# Patient Record
Sex: MALE | Race: WHITE | NOT HISPANIC OR LATINO | Employment: OTHER | URBAN - METROPOLITAN AREA
[De-identification: names, ages, dates, MRNs, and addresses within clinical notes are randomized per-mention and may not be internally consistent; named-entity substitution may affect disease eponyms.]

---

## 2023-04-11 DIAGNOSIS — N40.1 BENIGN PROSTATIC HYPERPLASIA WITH URINARY FREQUENCY: Primary | ICD-10-CM

## 2023-04-11 DIAGNOSIS — R35.0 BENIGN PROSTATIC HYPERPLASIA WITH URINARY FREQUENCY: Primary | ICD-10-CM

## 2023-04-11 RX ORDER — TAMSULOSIN HYDROCHLORIDE 0.4 MG/1
0.4 CAPSULE ORAL DAILY
Qty: 90 CAPSULE | Refills: 2 | Status: SHIPPED | OUTPATIENT
Start: 2023-04-11 | End: 2024-05-03 | Stop reason: SDUPTHER

## 2023-04-11 RX ORDER — TAMSULOSIN HYDROCHLORIDE 0.4 MG/1
0.4 CAPSULE ORAL DAILY
COMMUNITY
End: 2023-04-11 | Stop reason: SDUPTHER

## 2023-04-18 LAB
ABO GROUP (TYPE) IN BLOOD: NORMAL
ACTIVATED PARTIAL THROMBOPLASTIN TIME IN PPP BY COAGULATION ASSAY: 29 SEC (ref 26–39)
ANION GAP IN SER/PLAS: 10 MMOL/L (ref 10–20)
ANTIBODY SCREEN: NORMAL
CALCIUM (MG/DL) IN SER/PLAS: 9.2 MG/DL (ref 8.6–10.3)
CARBON DIOXIDE, TOTAL (MMOL/L) IN SER/PLAS: 31 MMOL/L (ref 21–32)
CHLORIDE (MMOL/L) IN SER/PLAS: 106 MMOL/L (ref 98–107)
CREATININE (MG/DL) IN SER/PLAS: 1.13 MG/DL (ref 0.5–1.3)
ERYTHROCYTE DISTRIBUTION WIDTH (RATIO) BY AUTOMATED COUNT: 13.8 % (ref 11.5–14.5)
ERYTHROCYTE MEAN CORPUSCULAR HEMOGLOBIN CONCENTRATION (G/DL) BY AUTOMATED: 34.1 G/DL (ref 32–36)
ERYTHROCYTE MEAN CORPUSCULAR VOLUME (FL) BY AUTOMATED COUNT: 94 FL (ref 80–100)
ERYTHROCYTES (10*6/UL) IN BLOOD BY AUTOMATED COUNT: 4.94 X10E12/L (ref 4.5–5.9)
GFR MALE: 69 ML/MIN/1.73M2
GLUCOSE (MG/DL) IN SER/PLAS: 105 MG/DL (ref 74–99)
HEMATOCRIT (%) IN BLOOD BY AUTOMATED COUNT: 46.3 % (ref 41–52)
HEMOGLOBIN (G/DL) IN BLOOD: 15.8 G/DL (ref 13.5–17.5)
INR IN PPP BY COAGULATION ASSAY: 1 (ref 0.9–1.1)
LEUKOCYTES (10*3/UL) IN BLOOD BY AUTOMATED COUNT: 5.6 X10E9/L (ref 4.4–11.3)
NRBC (PER 100 WBCS) BY AUTOMATED COUNT: 0 /100 WBC (ref 0–0)
PLATELETS (10*3/UL) IN BLOOD AUTOMATED COUNT: 142 X10E9/L (ref 150–450)
POTASSIUM (MMOL/L) IN SER/PLAS: 4.6 MMOL/L (ref 3.5–5.3)
PROTHROMBIN TIME (PT) IN PPP BY COAGULATION ASSAY: 11.5 SEC (ref 9.8–13.4)
RH FACTOR: NORMAL
SODIUM (MMOL/L) IN SER/PLAS: 142 MMOL/L (ref 136–145)
UREA NITROGEN (MG/DL) IN SER/PLAS: 15 MG/DL (ref 6–23)

## 2023-04-19 LAB — URINE CULTURE: NORMAL

## 2023-04-25 ENCOUNTER — HOSPITAL ENCOUNTER (OUTPATIENT)
Dept: DATA CONVERSION | Facility: HOSPITAL | Age: 73
End: 2023-04-25
Attending: RADIOLOGY | Admitting: RADIOLOGY
Payer: MEDICARE

## 2023-04-25 DIAGNOSIS — C64.2 MALIGNANT NEOPLASM OF LEFT KIDNEY, EXCEPT RENAL PELVIS (MULTI): ICD-10-CM

## 2023-04-25 DIAGNOSIS — I10 ESSENTIAL (PRIMARY) HYPERTENSION: ICD-10-CM

## 2023-04-25 DIAGNOSIS — N28.9 DISORDER OF KIDNEY AND URETER, UNSPECIFIED: ICD-10-CM

## 2023-04-25 DIAGNOSIS — D30.02 BENIGN NEOPLASM OF LEFT KIDNEY: ICD-10-CM

## 2023-04-25 DIAGNOSIS — Z90.49 ACQUIRED ABSENCE OF OTHER SPECIFIED PARTS OF DIGESTIVE TRACT: ICD-10-CM

## 2023-04-25 DIAGNOSIS — Q61.02 CONGENITAL MULTIPLE RENAL CYSTS: ICD-10-CM

## 2023-04-25 DIAGNOSIS — N43.3 HYDROCELE, UNSPECIFIED: ICD-10-CM

## 2023-04-25 DIAGNOSIS — F41.9 ANXIETY DISORDER, UNSPECIFIED: ICD-10-CM

## 2023-04-25 DIAGNOSIS — K21.9 GASTRO-ESOPHAGEAL REFLUX DISEASE WITHOUT ESOPHAGITIS: ICD-10-CM

## 2023-04-25 DIAGNOSIS — E78.00 PURE HYPERCHOLESTEROLEMIA, UNSPECIFIED: ICD-10-CM

## 2023-04-25 DIAGNOSIS — N40.0 BENIGN PROSTATIC HYPERPLASIA WITHOUT LOWER URINARY TRACT SYMPTOMS: ICD-10-CM

## 2023-05-03 LAB
COMPLETE PATHOLOGY REPORT: NORMAL
CONVERTED CLINICAL DIAGNOSIS-HISTORY: NORMAL
CONVERTED FINAL DIAGNOSIS: NORMAL
CONVERTED FINAL REPORT PDF LINK TO COPY AND PASTE: NORMAL
CONVERTED GROSS DESCRIPTION: NORMAL

## 2023-05-25 PROBLEM — K76.0 FATTY LIVER: Status: ACTIVE | Noted: 2023-05-25

## 2023-05-25 PROBLEM — S89.91XA INJURY OF RIGHT LEG: Status: ACTIVE | Noted: 2023-05-25

## 2023-05-25 PROBLEM — S86.119A GASTROCNEMIUS TEAR: Status: ACTIVE | Noted: 2023-05-25

## 2023-05-25 PROBLEM — H90.3 BILATERAL SENSORINEURAL HEARING LOSS: Status: ACTIVE | Noted: 2023-05-25

## 2023-05-25 PROBLEM — H61.20 CERUMEN IMPACTION: Status: ACTIVE | Noted: 2023-05-25

## 2023-05-25 PROBLEM — H61.21 EXCESSIVE CERUMEN IN RIGHT EAR CANAL: Status: ACTIVE | Noted: 2023-05-25

## 2023-05-25 PROBLEM — N17.9 ACUTE KIDNEY INJURY (CMS-HCC): Status: ACTIVE | Noted: 2023-05-25

## 2023-05-25 PROBLEM — S86.111A RUPTURE OF RIGHT GASTROCNEMIUS TENDON: Status: ACTIVE | Noted: 2023-05-25

## 2023-05-25 PROBLEM — N28.1 RENAL CYST: Status: ACTIVE | Noted: 2023-05-25

## 2023-05-25 PROBLEM — F41.9 ANXIETY DISORDER: Status: ACTIVE | Noted: 2023-05-25

## 2023-05-25 PROBLEM — N40.0 ENLARGED PROSTATE: Status: ACTIVE | Noted: 2023-05-25

## 2023-05-25 PROBLEM — M54.50 CHRONIC LOW BACK PAIN: Status: ACTIVE | Noted: 2023-05-25

## 2023-05-25 PROBLEM — R07.9 CHEST PAIN: Status: ACTIVE | Noted: 2023-05-25

## 2023-05-25 PROBLEM — E78.5 HYPERLIPIDEMIA: Status: ACTIVE | Noted: 2023-05-25

## 2023-05-25 PROBLEM — I10 BENIGN ESSENTIAL HYPERTENSION: Status: ACTIVE | Noted: 2023-05-25

## 2023-05-25 PROBLEM — K21.9 GERD (GASTROESOPHAGEAL REFLUX DISEASE): Status: ACTIVE | Noted: 2023-05-25

## 2023-05-25 PROBLEM — M79.669 CALF PAIN: Status: ACTIVE | Noted: 2023-05-25

## 2023-05-25 PROBLEM — G89.29 CHRONIC LOW BACK PAIN: Status: ACTIVE | Noted: 2023-05-25

## 2023-05-25 PROBLEM — N43.3 HYDROCELE, BILATERAL: Status: ACTIVE | Noted: 2023-05-25

## 2023-05-25 PROBLEM — G93.32 CHRONIC FATIGUE SYNDROME: Status: ACTIVE | Noted: 2023-05-25

## 2023-05-25 PROBLEM — I86.1 BILATERAL VARICOCELES: Status: ACTIVE | Noted: 2023-05-25

## 2023-05-31 ENCOUNTER — OFFICE VISIT (OUTPATIENT)
Dept: PRIMARY CARE | Facility: CLINIC | Age: 73
End: 2023-05-31
Payer: MEDICARE

## 2023-05-31 VITALS
WEIGHT: 269 LBS | OXYGEN SATURATION: 98 % | DIASTOLIC BLOOD PRESSURE: 70 MMHG | HEART RATE: 65 BPM | TEMPERATURE: 97.4 F | SYSTOLIC BLOOD PRESSURE: 148 MMHG | RESPIRATION RATE: 16 BRPM | BODY MASS INDEX: 37.52 KG/M2

## 2023-05-31 DIAGNOSIS — K43.9 VENTRAL HERNIA WITHOUT OBSTRUCTION OR GANGRENE: ICD-10-CM

## 2023-05-31 DIAGNOSIS — I10 BENIGN ESSENTIAL HYPERTENSION: Primary | ICD-10-CM

## 2023-05-31 PROCEDURE — 3077F SYST BP >= 140 MM HG: CPT | Performed by: INTERNAL MEDICINE

## 2023-05-31 PROCEDURE — 1036F TOBACCO NON-USER: CPT | Performed by: INTERNAL MEDICINE

## 2023-05-31 PROCEDURE — G0439 PPPS, SUBSEQ VISIT: HCPCS | Performed by: INTERNAL MEDICINE

## 2023-05-31 PROCEDURE — 1159F MED LIST DOCD IN RCRD: CPT | Performed by: INTERNAL MEDICINE

## 2023-05-31 PROCEDURE — 99214 OFFICE O/P EST MOD 30 MIN: CPT | Performed by: INTERNAL MEDICINE

## 2023-05-31 PROCEDURE — 3008F BODY MASS INDEX DOCD: CPT | Performed by: INTERNAL MEDICINE

## 2023-05-31 PROCEDURE — 1170F FXNL STATUS ASSESSED: CPT | Performed by: INTERNAL MEDICINE

## 2023-05-31 PROCEDURE — 3078F DIAST BP <80 MM HG: CPT | Performed by: INTERNAL MEDICINE

## 2023-05-31 PROCEDURE — 1160F RVW MEDS BY RX/DR IN RCRD: CPT | Performed by: INTERNAL MEDICINE

## 2023-05-31 RX ORDER — LANOLIN ALCOHOL/MO/W.PET/CERES
1 CREAM (GRAM) TOPICAL NIGHTLY
COMMUNITY

## 2023-05-31 RX ORDER — ESCITALOPRAM OXALATE 5 MG/1
5 TABLET ORAL DAILY
COMMUNITY
End: 2023-10-19 | Stop reason: SDUPTHER

## 2023-05-31 RX ORDER — SIMVASTATIN 20 MG/1
20 TABLET, FILM COATED ORAL NIGHTLY
COMMUNITY
Start: 2015-05-14 | End: 2023-11-16

## 2023-05-31 RX ORDER — BUPROPION HYDROCHLORIDE 150 MG/1
150 TABLET ORAL DAILY
Status: ON HOLD | COMMUNITY
End: 2023-10-23 | Stop reason: ENTERED-IN-ERROR

## 2023-05-31 RX ORDER — IBUPROFEN 600 MG/1
600 TABLET ORAL 3 TIMES DAILY
Status: ON HOLD | COMMUNITY
Start: 2017-06-07 | End: 2023-10-23 | Stop reason: ENTERED-IN-ERROR

## 2023-05-31 RX ORDER — FAMOTIDINE 20 MG/1
20 TABLET, FILM COATED ORAL DAILY PRN
COMMUNITY
Start: 2023-05-23 | End: 2024-01-19 | Stop reason: ENTERED-IN-ERROR

## 2023-05-31 ASSESSMENT — ENCOUNTER SYMPTOMS
DIARRHEA: 0
FREQUENCY: 0
CONSTIPATION: 0
HEMATURIA: 0
BLOOD IN STOOL: 0

## 2023-05-31 ASSESSMENT — PATIENT HEALTH QUESTIONNAIRE - PHQ9
2. FEELING DOWN, DEPRESSED OR HOPELESS: NOT AT ALL
1. LITTLE INTEREST OR PLEASURE IN DOING THINGS: NOT AT ALL
SUM OF ALL RESPONSES TO PHQ9 QUESTIONS 1 AND 2: 0

## 2023-05-31 ASSESSMENT — ACTIVITIES OF DAILY LIVING (ADL)
DRESSING: INDEPENDENT
GROCERY_SHOPPING: INDEPENDENT
MANAGING_FINANCES: INDEPENDENT
DOING_HOUSEWORK: INDEPENDENT
BATHING: INDEPENDENT
TAKING_MEDICATION: INDEPENDENT

## 2023-05-31 NOTE — PROGRESS NOTES
Patient here for a Medicare wellness visit     Subjective   Patient ID: Jovanny Yap is a 73 y.o. male who presents for Medicare Annual Wellness Visit Subsequent.    The patient met with  from Urology for a left-sided renal lesion, and was initially scheduled to undergo a left open partial nephrectomy in 5/2023.  He completed an MRI Abdomen in 3/2023 which showed no significant interval change known oncocytic lesion left kidney, and newly seen left perinephric abnormality compatible with small hematoma with surrounding enhancing tissue likely granulation tissue.  The surgical procedure was postponed, and the patient states he will be monitored with MRI imaging every 6 months for surveillance.  He denies any hematuria or urinary symptoms.    The patient reports a ventral abdominal hernia, and requests a referral to General Surgery.  He reports a history of right hemicolectomy in 11/2012.  He denies any hematochezia, melena, or bowel problems.     The patient recently presented to the ER for another episode of atypical chest pain on 5/23/2023.  He recalls that routine investigations were generally reassuring and he was discharged home that same day in stable condition.  The patient requests a referral to Cardiology for evaluation and treatment.    The patient met with Dermatology yesterday where a skin lesion was removed.  He states that the pathology results are pending.       Review of Systems   Gastrointestinal:  Negative for blood in stool, constipation and diarrhea.        Positive for ventral abdominal hernia.   Genitourinary:  Negative for frequency and hematuria.       Objective   Physical Exam  Constitutional:       Appearance: Normal appearance.   Cardiovascular:      Rate and Rhythm: Normal rate and regular rhythm.      Heart sounds: Normal heart sounds.   Pulmonary:      Effort: Pulmonary effort is normal.      Breath sounds: Normal breath sounds.   Abdominal:      General: Bowel sounds are  normal.      Palpations: Abdomen is soft.      Tenderness: There is no abdominal tenderness.   Skin:     General: Skin is warm and dry.   Neurological:      General: No focal deficit present.      Mental Status: He is alert and oriented to person, place, and time. Mental status is at baseline.   Psychiatric:         Mood and Affect: Mood normal.         Behavior: Behavior normal.       Assessment/Plan   Problem List Items Addressed This Visit       Benign essential hypertension - Primary    Relevant Orders    Referral to Cardiology     Other Visit Diagnoses       Ventral hernia without obstruction or gangrene        Relevant Orders    Referral to General Surgery            Medicare Wellness Examination Done  -  Discussed healthy diet and regular exercise.    -  Physical exam overall unremarkable. Immunizations reviewed and updated accordingly. Healthy lifestyle choices discussed (tobacco avoidance, appropriate alcohol use, avoidance of illicit substances).   -  Patient is wearing seatbelt.   -  Screening lab work ordered as indicated.    -  Age appropriate screening tests reviewed with patient.       IMPRESSION/PLAN:     Ventral Abdominal Hernia  - Ordered referral to General Surgery with  and patient given contact information.    HTN  - /70 today in office.  Ordered referral to Cardiology with  and patient given contact information.     HLD   - Stable, continue on simvastatin 20mg QD.  CT Cardiac Score 8.77 per 11/2022.     Kidney Lesion   - Incidental CT Abdomen finding of 3.4 x 2.7 cm ill-defined hypodense area in the interpolar region of the left kidney. MRI Abdomen 3/2023 showed no significant interval change known oncocytic lesion left kidney, and newly seen left perinephric abnormality compatible with small hematoma with surrounding enhancing tissue likely granulation tissue. Attention recommended on follow-up assessment.     Small Bowel Obstruction   - Stable. No repeat imaging  necessary per [facility] during hospital stay. Monitor for now. Advised patient to return to ED with return of symptoms.     Chronic Fatigue Syndrome/Depression   - Symptoms stable on escitalopram 5mg QD. Continue with Vitamin B12 1000 mcg QD per Psychiatry. Discontinued bupropion ER 150mg QD after small bowel obstruction in 12/2022.     BPH   - Last PSA in normal range - 9/2022, maintained on tamsulosin 0.4mg QD.     Health Maintenance   - Routine labs 9/2022. Last PSA wnl 9/2022.  Last colonoscopy performed 10/2018, due for repeat 10/2023.      Follow up in 6 months, call sooner if needed.        Scribe Attestation  By signing my name below, I, Herberth Henson   attest that this documentation has been prepared under the direction and in the presence of Jhonny Cuevas DO.

## 2023-06-22 DIAGNOSIS — K21.9 GASTROESOPHAGEAL REFLUX DISEASE WITHOUT ESOPHAGITIS: Primary | ICD-10-CM

## 2023-06-22 RX ORDER — PANTOPRAZOLE SODIUM 40 MG/1
40 TABLET, DELAYED RELEASE ORAL DAILY
Qty: 90 TABLET | Refills: 2 | Status: SHIPPED | OUTPATIENT
Start: 2023-06-22 | End: 2024-04-24 | Stop reason: SDUPTHER

## 2023-06-22 RX ORDER — PANTOPRAZOLE SODIUM 40 MG/1
1 TABLET, DELAYED RELEASE ORAL DAILY
COMMUNITY
Start: 2022-09-01 | End: 2023-06-22 | Stop reason: SDUPTHER

## 2023-08-24 ENCOUNTER — PATIENT OUTREACH (OUTPATIENT)
Dept: CARE COORDINATION | Facility: CLINIC | Age: 73
End: 2023-08-24
Payer: MEDICARE

## 2023-08-24 RX ORDER — ONDANSETRON 4 MG/1
4 TABLET, ORALLY DISINTEGRATING ORAL EVERY 8 HOURS PRN
Status: ON HOLD | COMMUNITY
End: 2023-10-23 | Stop reason: ENTERED-IN-ERROR
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
## 2023-08-30 ENCOUNTER — OFFICE VISIT (OUTPATIENT)
Dept: PRIMARY CARE | Facility: CLINIC | Age: 73
End: 2023-08-30
Payer: MEDICARE

## 2023-08-30 VITALS
WEIGHT: 259 LBS | OXYGEN SATURATION: 98 % | TEMPERATURE: 97.8 F | HEART RATE: 78 BPM | SYSTOLIC BLOOD PRESSURE: 148 MMHG | DIASTOLIC BLOOD PRESSURE: 80 MMHG | RESPIRATION RATE: 18 BRPM | BODY MASS INDEX: 36.12 KG/M2

## 2023-08-30 DIAGNOSIS — K56.609 SBO (SMALL BOWEL OBSTRUCTION) (MULTI): Primary | ICD-10-CM

## 2023-08-30 PROBLEM — I82.409 DVT (DEEP VENOUS THROMBOSIS) (MULTI): Status: ACTIVE | Noted: 2023-08-30

## 2023-08-30 PROBLEM — I82.412 ACUTE DEEP VEIN THROMBOSIS (DVT) OF FEMORAL VEIN OF LEFT LOWER EXTREMITY (MULTI): Status: ACTIVE | Noted: 2023-08-30

## 2023-08-30 PROBLEM — N28.9 KIDNEY LESION: Status: ACTIVE | Noted: 2023-08-30

## 2023-08-30 PROBLEM — G89.29 CHRONIC RIGHT SHOULDER PAIN: Status: ACTIVE | Noted: 2023-08-30

## 2023-08-30 PROBLEM — K43.2 INCISIONAL HERNIA: Status: ACTIVE | Noted: 2023-08-30

## 2023-08-30 PROBLEM — R11.2 NAUSEA AND VOMITING: Status: ACTIVE | Noted: 2023-08-30

## 2023-08-30 PROBLEM — R53.1 WEAKNESS: Status: ACTIVE | Noted: 2023-08-30

## 2023-08-30 PROBLEM — R19.7 DIARRHEA: Status: ACTIVE | Noted: 2023-08-30

## 2023-08-30 PROBLEM — R93.1 ELEVATED CORONARY ARTERY CALCIUM SCORE: Status: ACTIVE | Noted: 2023-08-30

## 2023-08-30 PROBLEM — M25.511 CHRONIC RIGHT SHOULDER PAIN: Status: ACTIVE | Noted: 2023-08-30

## 2023-08-30 PROBLEM — R68.89 FLU-LIKE SYMPTOMS: Status: ACTIVE | Noted: 2023-08-30

## 2023-08-30 PROCEDURE — 3008F BODY MASS INDEX DOCD: CPT | Performed by: INTERNAL MEDICINE

## 2023-08-30 PROCEDURE — 1160F RVW MEDS BY RX/DR IN RCRD: CPT | Performed by: INTERNAL MEDICINE

## 2023-08-30 PROCEDURE — 1125F AMNT PAIN NOTED PAIN PRSNT: CPT | Performed by: INTERNAL MEDICINE

## 2023-08-30 PROCEDURE — 1159F MED LIST DOCD IN RCRD: CPT | Performed by: INTERNAL MEDICINE

## 2023-08-30 PROCEDURE — 3077F SYST BP >= 140 MM HG: CPT | Performed by: INTERNAL MEDICINE

## 2023-08-30 PROCEDURE — 99214 OFFICE O/P EST MOD 30 MIN: CPT | Performed by: INTERNAL MEDICINE

## 2023-08-30 PROCEDURE — 3079F DIAST BP 80-89 MM HG: CPT | Performed by: INTERNAL MEDICINE

## 2023-08-30 PROCEDURE — 1036F TOBACCO NON-USER: CPT | Performed by: INTERNAL MEDICINE

## 2023-08-30 ASSESSMENT — PATIENT HEALTH QUESTIONNAIRE - PHQ9
2. FEELING DOWN, DEPRESSED OR HOPELESS: NOT AT ALL
SUM OF ALL RESPONSES TO PHQ9 QUESTIONS 1 AND 2: 0
1. LITTLE INTEREST OR PLEASURE IN DOING THINGS: NOT AT ALL

## 2023-08-30 ASSESSMENT — ENCOUNTER SYMPTOMS
DIARRHEA: 0
ABDOMINAL PAIN: 0
BLOOD IN STOOL: 0
CONSTIPATION: 0

## 2023-08-30 NOTE — PROGRESS NOTES
Patient here for a follow up from [facility] for a bowel obstruction     Subjective   Patient ID: Jovanny Yap is a 73 y.o. male who presents for GI Problem.  He is accompanied by his wife who offers some of the history.    The patient reports an episode of abdominal pain and vomiting, similar to a previous episode of small bowel obstruction, that lead to an ED visit at [facility] on 8/21/2023.  A CT Abdomen/Pelvis with Contrast confirmed a current small bowel obstruction with multiple transition points.  The patient was admitted, and an NG tube was placed for intermittent suction.  He was seen by General Surgery who deemed that conservative management was sufficient, as the patient began to tolerate oral intake.  He was discharged home in stable condition on 8/23/2023 with instructions to follow up with his Primary Care Physician.  He denies any current abdominal pain, hematochezia, melena, mucus, or bowel problems.     The patient has met with  from Cardiology for hypertension, who recommended a stress echocardiogram for further evaluation.  He completed the procedure in 7/2023 which was found to be normal with no evidence of ischemia.      The patient is following with  from General Surgery for a long-standing incisional hernia.  He was advised to return to the clinic following his colonoscopy and an evaluation from Neurology per the note, and discuss possible surgery at that time.  He is scheduled for an appointment tomorrow for the recent small bowel obstruction.    The patient continues to take Xarelto 15-20 mg Dosepack for a history of left deep venous thrombosis.  He continues to follow with  from Vascular Medicine.     The patient and his wife are going to travel to [place] for a 55 year high school reunion.    GI Problem  Primary symptoms do not include abdominal pain or diarrhea.   The illness does not include constipation.     Review of Systems   Gastrointestinal:  Negative for  abdominal pain, blood in stool, constipation and diarrhea.     Objective   Physical Exam  Constitutional:       Appearance: Normal appearance.   Neck:      Vascular: No carotid bruit.   Cardiovascular:      Rate and Rhythm: Normal rate and regular rhythm.      Heart sounds: Normal heart sounds.   Pulmonary:      Effort: Pulmonary effort is normal.      Breath sounds: Normal breath sounds.   Abdominal:      General: Bowel sounds are normal.      Palpations: Abdomen is soft.      Tenderness: There is no abdominal tenderness.   Skin:     General: Skin is warm and dry.   Neurological:      General: No focal deficit present.      Mental Status: He is alert and oriented to person, place, and time. Mental status is at baseline.   Psychiatric:         Mood and Affect: Mood normal.         Behavior: Behavior normal.       Assessment/Plan       IMPRESSION/PLAN:     Small Bowel Obstruction   - Second SBO episode 8/21/2023 treated conservatively.  First SBO 12/2022 treated conservatively.  Stable. No repeat imaging necessary per [facility] during hospital stay. Monitor for now. Advised patient to return to ED with return of symptoms.  Patient has appointment with  from General Surgery tomorrow.    HTN  - /80 today in office.  Last Stress Test 7/2023 unremarkable.  Following with  from Cardiology.     HLD   - Stable, continue on simvastatin 20mg QD.  CT Cardiac Score 8.77 per 11/2022.     Kidney Lesion   - Incidental CT Abdomen finding of 3.4 x 2.7 cm ill-defined hypodense area in the interpolar region of the left kidney. MRI Abdomen 3/2023 showed no significant interval change known oncocytic lesion left kidney, and newly seen left perinephric abnormality compatible with small hematoma with surrounding enhancing tissue likely granulation tissue. Attention recommended on follow-up assessment.     Left DVT  - Diagnosed per Venous Duplex U/S 8/3/2023.  Continue with Xarelto 15-20 mg Dosepack.  Following with   from Vascular Medicine.    Ventral Abdominal Hernia  - Following with  from General Surgery.    Chronic Fatigue Syndrome/Depression   - Symptoms stable on escitalopram 5mg QD. Continue with Vitamin B12 1000 mcg QD per Psychiatry. Discontinued bupropion ER 150mg QD after small bowel obstruction in 12/2022.     BPH   - Last PSA in normal range - 9/2022, maintained on tamsulosin 0.4mg QD.     Health Maintenance   - Routine labs 9/2022. Last PSA wnl 9/2022.  Last colonoscopy performed 10/2018, due for repeat 10/2023 and patient would like to schedule at next visit.      Follow up in 10/2023,  call sooner if needed.        Scribe Attestation  By signing my name below, I, Herberth Henson   attest that this documentation has been prepared under the direction and in the presence of Jhonny Cuevas DO.

## 2023-09-01 ENCOUNTER — PATIENT OUTREACH (OUTPATIENT)
Dept: PRIMARY CARE | Facility: CLINIC | Age: 73
End: 2023-09-01
Payer: MEDICARE

## 2023-09-01 NOTE — PROGRESS NOTES
Call regarding  after hospitalization.  At time of outreach call the patient feels as if their condition has improved since last visit.  Kwadwo states he is doing very well, no concerns.

## 2023-09-07 VITALS — BODY MASS INDEX: 37.79 KG/M2 | WEIGHT: 270.95 LBS

## 2023-09-10 NOTE — PROGRESS NOTES
Pt's wife paged me this AM, with bad diarrhea x 2 days,no fever,abdominal pain,blood in stools,no other complaints or respiratory symptoms,no recent ATB intake,advised to stay on clear liquid diet,advance to BRAT diet as tolerated,take Imodium PRN,check covid test and call me if positive,follow up with PCP.

## 2023-10-09 ENCOUNTER — OFFICE VISIT (OUTPATIENT)
Dept: PRIMARY CARE | Facility: CLINIC | Age: 73
End: 2023-10-09
Payer: MEDICARE

## 2023-10-09 VITALS
BODY MASS INDEX: 36.82 KG/M2 | OXYGEN SATURATION: 98 % | TEMPERATURE: 97.5 F | WEIGHT: 264 LBS | DIASTOLIC BLOOD PRESSURE: 70 MMHG | HEART RATE: 58 BPM | SYSTOLIC BLOOD PRESSURE: 128 MMHG | RESPIRATION RATE: 16 BRPM

## 2023-10-09 DIAGNOSIS — Z23 ENCOUNTER FOR IMMUNIZATION: Primary | ICD-10-CM

## 2023-10-09 DIAGNOSIS — Z12.11 COLON CANCER SCREENING: ICD-10-CM

## 2023-10-09 PROCEDURE — 1159F MED LIST DOCD IN RCRD: CPT | Performed by: INTERNAL MEDICINE

## 2023-10-09 PROCEDURE — 1125F AMNT PAIN NOTED PAIN PRSNT: CPT | Performed by: INTERNAL MEDICINE

## 2023-10-09 PROCEDURE — 1160F RVW MEDS BY RX/DR IN RCRD: CPT | Performed by: INTERNAL MEDICINE

## 2023-10-09 PROCEDURE — 3078F DIAST BP <80 MM HG: CPT | Performed by: INTERNAL MEDICINE

## 2023-10-09 PROCEDURE — 3008F BODY MASS INDEX DOCD: CPT | Performed by: INTERNAL MEDICINE

## 2023-10-09 PROCEDURE — 3074F SYST BP LT 130 MM HG: CPT | Performed by: INTERNAL MEDICINE

## 2023-10-09 PROCEDURE — G0008 ADMIN INFLUENZA VIRUS VAC: HCPCS | Performed by: INTERNAL MEDICINE

## 2023-10-09 PROCEDURE — 99214 OFFICE O/P EST MOD 30 MIN: CPT | Performed by: INTERNAL MEDICINE

## 2023-10-09 PROCEDURE — 90662 IIV NO PRSV INCREASED AG IM: CPT | Performed by: INTERNAL MEDICINE

## 2023-10-09 PROCEDURE — 1036F TOBACCO NON-USER: CPT | Performed by: INTERNAL MEDICINE

## 2023-10-09 ASSESSMENT — ENCOUNTER SYMPTOMS
CONSTIPATION: 0
BLOOD IN STOOL: 0
SLEEP DISTURBANCE: 0
FATIGUE: 1

## 2023-10-09 NOTE — PROGRESS NOTES
Patient here for a follow up    Subjective   Patient ID: Jovanny Yap is a 73 y.o. male who presents for Follow-up.    The patient continues to take Xarelto 20 mg for a history of left DVT, and is following with  from Vascular Medicine.  He notes that his condition is stable overall and his blood pressure measurements have been well controlled.    The patient is following with  from General Surgery for two episodes of small bowel obstruction.  He is to follow-up with his Specialist after completing a colonoscopy.  He notes occasional transient abdominal discomfort, but denies any constipation or diarrhea.    The patient continues to follow with  from Urology for a left renal mass.  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  He is scheduled for an upcoming MRI of the Kidney.      The patient reports fatigue and lethargy on waking in the morning despite good sleep quality.  He believes he takes simvastatin 20 mg once nightly, and tamsulosin 0.4 mg once nightly at night time.  He denies any changes to his medication regimen and is tolerating them well thus far.      Review of Systems   Constitutional:  Positive for fatigue.   Gastrointestinal:  Negative for blood in stool and constipation.   Psychiatric/Behavioral:  Negative for sleep disturbance.    All other systems reviewed and are negative.    Objective   Physical Exam  Constitutional:       Appearance: Normal appearance.   Neck:      Vascular: No carotid bruit.   Cardiovascular:      Rate and Rhythm: Normal rate and regular rhythm.      Heart sounds: Normal heart sounds.   Pulmonary:      Effort: Pulmonary effort is normal.      Breath sounds: Normal breath sounds.   Abdominal:      General: Bowel sounds are normal.      Palpations: Abdomen is soft.      Tenderness: There is no abdominal tenderness.   Skin:     General: Skin is warm and dry.   Neurological:      General: No focal deficit  present.      Mental Status: He is alert and oriented to person, place, and time. Mental status is at baseline.   Psychiatric:         Mood and Affect: Mood normal.         Behavior: Behavior normal.       Assessment/Plan   Problem List Items Addressed This Visit    None  Visit Diagnoses         Codes    Encounter for immunization    -  Primary Z23    Relevant Orders    Flu vaccine, quadrivalent, high-dose, preservative free, age 65y+ (FLUZONE) (Completed)    Colon cancer screening     Z12.11    Relevant Orders    Colonoscopy Screening            IMPRESSION/PLAN:     Small Bowel Obstruction   - Second SBO episode 8/21/2023 treated conservatively.  First SBO 12/2022 treated conservatively.  Stable. No repeat imaging necessary per [facility] during hospital stay. Monitor for now. Advised patient to return to ED with return of symptoms.  Following  from General Surgery.  Ordered screening colonoscopy.    HTN  - /70 today in office.  Last Stress Test 7/2023 unremarkable.  Following with  from Cardiology.     HLD   - Stable, continue on simvastatin 20mg QD.  CT Cardiac Score 8.77 per 11/2022.     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  Incidental CT Abdomen finding of 3.4 x 2.7 cm ill-defined hypodense area in the interpolar region of the left kidney. MRI Abdomen 3/2023 showed no significant interval change known oncocytic lesion left kidney, and newly seen left perinephric abnormality compatible with small hematoma with surrounding enhancing tissue likely granulation tissue. Following  from Urology.  Patient has upcoming MRI kidney for surveillance.     Left DVT  - Diagnosed per Venous Duplex U/S 8/3/2023.  Continue with Xarelto 20 mg.  Following with  from Vascular Medicine.     Ventral Abdominal Hernia  - Following with  from General Surgery.     Chronic Fatigue Syndrome/Depression   - Symptoms stable on escitalopram 5mg QD. Continue with Vitamin B12 1000  mcg QD per Psychiatry. Discontinued bupropion ER 150mg QD after small bowel obstruction in 12/2022.     BPH   - Last PSA in normal range - 9/2022, maintained on tamsulosin 0.4mg QD.    Health Maintenance   - Routine labs 8/2023. Last PSA wnl 9/2022.  Last colonoscopy performed 10/2018, ordered repeat for 2023. Patient received high-dose Influenza vaccine in the clinic today, tolerated well.      Follow up according to routine health maintenance,  call sooner if needed.        Scribe Attestation  By signing my name below, IIris Scribe   attest that this documentation has been prepared under the direction and in the presence of Jhonny Cuevas DO.

## 2023-10-19 DIAGNOSIS — F41.9 ANXIETY DISORDER, UNSPECIFIED TYPE: Primary | ICD-10-CM

## 2023-10-19 RX ORDER — ESCITALOPRAM OXALATE 5 MG/1
5 TABLET ORAL DAILY
Qty: 90 TABLET | Refills: 1 | Status: SHIPPED | OUTPATIENT
Start: 2023-10-19 | End: 2024-02-05 | Stop reason: SDUPTHER

## 2023-10-22 ENCOUNTER — APPOINTMENT (OUTPATIENT)
Dept: RADIOLOGY | Facility: HOSPITAL | Age: 73
DRG: 390 | End: 2023-10-22
Payer: MEDICARE

## 2023-10-22 ENCOUNTER — HOSPITAL ENCOUNTER (INPATIENT)
Facility: HOSPITAL | Age: 73
LOS: 1 days | Discharge: HOME | DRG: 390 | End: 2023-10-24
Attending: EMERGENCY MEDICINE | Admitting: INTERNAL MEDICINE
Payer: MEDICARE

## 2023-10-22 DIAGNOSIS — K56.51 INTESTINAL ADHESIONS WITH PARTIAL OBSTRUCTION (MULTI): Primary | ICD-10-CM

## 2023-10-22 LAB
ALBUMIN SERPL BCP-MCNC: 4.4 G/DL (ref 3.4–5)
ALP SERPL-CCNC: 62 U/L (ref 33–136)
ALT SERPL W P-5'-P-CCNC: 27 U/L (ref 10–52)
ANION GAP SERPL CALC-SCNC: 11 MMOL/L (ref 10–20)
APPEARANCE UR: CLEAR
AST SERPL W P-5'-P-CCNC: 21 U/L (ref 9–39)
BILIRUB SERPL-MCNC: 0.6 MG/DL (ref 0–1.2)
BILIRUB UR STRIP.AUTO-MCNC: NEGATIVE MG/DL
BUN SERPL-MCNC: 18 MG/DL (ref 6–23)
CALCIUM SERPL-MCNC: 9.5 MG/DL (ref 8.6–10.3)
CHLORIDE SERPL-SCNC: 103 MMOL/L (ref 98–107)
CO2 SERPL-SCNC: 30 MMOL/L (ref 21–32)
COLOR UR: YELLOW
CREAT SERPL-MCNC: 1.17 MG/DL (ref 0.5–1.3)
ERYTHROCYTE [DISTWIDTH] IN BLOOD BY AUTOMATED COUNT: 12.7 % (ref 11.5–14.5)
GFR SERPL CREATININE-BSD FRML MDRD: 66 ML/MIN/1.73M*2
GLUCOSE SERPL-MCNC: 116 MG/DL (ref 74–99)
GLUCOSE UR STRIP.AUTO-MCNC: NEGATIVE MG/DL
HCT VFR BLD AUTO: 47.3 % (ref 41–52)
HGB BLD-MCNC: 16.1 G/DL (ref 13.5–17.5)
KETONES UR STRIP.AUTO-MCNC: NEGATIVE MG/DL
LEUKOCYTE ESTERASE UR QL STRIP.AUTO: ABNORMAL
LIPASE SERPL-CCNC: 36 U/L (ref 9–82)
MCH RBC QN AUTO: 32.3 PG (ref 26–34)
MCHC RBC AUTO-ENTMCNC: 34 G/DL (ref 32–36)
MCV RBC AUTO: 95 FL (ref 80–100)
NITRITE UR QL STRIP.AUTO: NEGATIVE
NRBC BLD-RTO: 0 /100 WBCS (ref 0–0)
PH UR STRIP.AUTO: 5 [PH]
PLATELET # BLD AUTO: 157 X10*3/UL (ref 150–450)
PMV BLD AUTO: 10 FL (ref 7.5–11.5)
POTASSIUM SERPL-SCNC: 4 MMOL/L (ref 3.5–5.3)
PROT SERPL-MCNC: 7 G/DL (ref 6.4–8.2)
PROT UR STRIP.AUTO-MCNC: NEGATIVE MG/DL
RBC # BLD AUTO: 4.99 X10*6/UL (ref 4.5–5.9)
RBC # UR STRIP.AUTO: ABNORMAL /UL
RBC #/AREA URNS AUTO: NORMAL /HPF
SODIUM SERPL-SCNC: 140 MMOL/L (ref 136–145)
SP GR UR STRIP.AUTO: 1.02
UROBILINOGEN UR STRIP.AUTO-MCNC: <2 MG/DL
WBC # BLD AUTO: 9.6 X10*3/UL (ref 4.4–11.3)
WBC #/AREA URNS AUTO: NORMAL /HPF

## 2023-10-22 PROCEDURE — 87086 URINE CULTURE/COLONY COUNT: CPT | Mod: CMCLAB

## 2023-10-22 PROCEDURE — 2550000001 HC RX 255 CONTRASTS: Performed by: EMERGENCY MEDICINE

## 2023-10-22 PROCEDURE — 80053 COMPREHEN METABOLIC PANEL: CPT | Performed by: EMERGENCY MEDICINE

## 2023-10-22 PROCEDURE — 81001 URINALYSIS AUTO W/SCOPE: CPT

## 2023-10-22 PROCEDURE — 85027 COMPLETE CBC AUTOMATED: CPT | Performed by: STUDENT IN AN ORGANIZED HEALTH CARE EDUCATION/TRAINING PROGRAM

## 2023-10-22 PROCEDURE — 96361 HYDRATE IV INFUSION ADD-ON: CPT

## 2023-10-22 PROCEDURE — 80053 COMPREHEN METABOLIC PANEL: CPT | Performed by: STUDENT IN AN ORGANIZED HEALTH CARE EDUCATION/TRAINING PROGRAM

## 2023-10-22 PROCEDURE — 36415 COLL VENOUS BLD VENIPUNCTURE: CPT | Performed by: STUDENT IN AN ORGANIZED HEALTH CARE EDUCATION/TRAINING PROGRAM

## 2023-10-22 PROCEDURE — 83690 ASSAY OF LIPASE: CPT

## 2023-10-22 PROCEDURE — 87086 URINE CULTURE/COLONY COUNT: CPT | Mod: STJLAB

## 2023-10-22 PROCEDURE — 74177 CT ABD & PELVIS W/CONTRAST: CPT | Performed by: RADIOLOGY

## 2023-10-22 PROCEDURE — 96374 THER/PROPH/DIAG INJ IV PUSH: CPT

## 2023-10-22 PROCEDURE — 99285 EMERGENCY DEPT VISIT HI MDM: CPT | Performed by: EMERGENCY MEDICINE

## 2023-10-22 PROCEDURE — 85027 COMPLETE CBC AUTOMATED: CPT | Performed by: EMERGENCY MEDICINE

## 2023-10-22 PROCEDURE — 74177 CT ABD & PELVIS W/CONTRAST: CPT

## 2023-10-22 RX ADMIN — IOHEXOL 75 ML: 350 INJECTION, SOLUTION INTRAVENOUS at 22:16

## 2023-10-22 ASSESSMENT — LIFESTYLE VARIABLES
EVER FELT BAD OR GUILTY ABOUT YOUR DRINKING: NO
REASON UNABLE TO ASSESS: NO
EVER HAD A DRINK FIRST THING IN THE MORNING TO STEADY YOUR NERVES TO GET RID OF A HANGOVER: NO
HAVE PEOPLE ANNOYED YOU BY CRITICIZING YOUR DRINKING: NO
HAVE YOU EVER FELT YOU SHOULD CUT DOWN ON YOUR DRINKING: NO

## 2023-10-22 ASSESSMENT — PAIN DESCRIPTION - LOCATION: LOCATION: ABDOMEN

## 2023-10-22 ASSESSMENT — COLUMBIA-SUICIDE SEVERITY RATING SCALE - C-SSRS
2. HAVE YOU ACTUALLY HAD ANY THOUGHTS OF KILLING YOURSELF?: NO
1. IN THE PAST MONTH, HAVE YOU WISHED YOU WERE DEAD OR WISHED YOU COULD GO TO SLEEP AND NOT WAKE UP?: NO
6. HAVE YOU EVER DONE ANYTHING, STARTED TO DO ANYTHING, OR PREPARED TO DO ANYTHING TO END YOUR LIFE?: NO

## 2023-10-22 ASSESSMENT — PAIN SCALES - GENERAL
PAINLEVEL_OUTOF10: 0 - NO PAIN
PAINLEVEL_OUTOF10: 0 - NO PAIN
PAINLEVEL_OUTOF10: 2
PAINLEVEL_OUTOF10: 0 - NO PAIN

## 2023-10-22 ASSESSMENT — PAIN - FUNCTIONAL ASSESSMENT: PAIN_FUNCTIONAL_ASSESSMENT: 0-10

## 2023-10-23 ENCOUNTER — APPOINTMENT (OUTPATIENT)
Dept: RADIOLOGY | Facility: HOSPITAL | Age: 73
DRG: 390 | End: 2023-10-23
Payer: MEDICARE

## 2023-10-23 ENCOUNTER — APPOINTMENT (OUTPATIENT)
Dept: RADIOLOGY | Facility: CLINIC | Age: 73
End: 2023-10-23
Payer: MEDICARE

## 2023-10-23 PROBLEM — E78.5 HYPERLIPIDEMIA: Chronic | Status: ACTIVE | Noted: 2023-05-25

## 2023-10-23 PROBLEM — I10 BENIGN ESSENTIAL HYPERTENSION: Chronic | Status: ACTIVE | Noted: 2023-05-25

## 2023-10-23 PROBLEM — G93.32 CHRONIC FATIGUE SYNDROME: Chronic | Status: ACTIVE | Noted: 2023-05-25

## 2023-10-23 PROBLEM — N40.0 ENLARGED PROSTATE: Chronic | Status: ACTIVE | Noted: 2023-05-25

## 2023-10-23 PROBLEM — K56.600 PARTIAL SMALL BOWEL OBSTRUCTION (MULTI): Status: ACTIVE | Noted: 2023-08-30

## 2023-10-23 PROBLEM — K56.51 INTESTINAL ADHESIONS WITH PARTIAL OBSTRUCTION (MULTI): Status: ACTIVE | Noted: 2023-10-23

## 2023-10-23 PROBLEM — I82.412 ACUTE DEEP VEIN THROMBOSIS (DVT) OF FEMORAL VEIN OF LEFT LOWER EXTREMITY (MULTI): Chronic | Status: ACTIVE | Noted: 2023-08-30

## 2023-10-23 LAB — HOLD SPECIMEN: NORMAL

## 2023-10-23 PROCEDURE — 74018 RADEX ABDOMEN 1 VIEW: CPT | Mod: FY

## 2023-10-23 PROCEDURE — 74018 RADEX ABDOMEN 1 VIEW: CPT | Performed by: RADIOLOGY

## 2023-10-23 PROCEDURE — 99222 1ST HOSP IP/OBS MODERATE 55: CPT | Performed by: SURGERY

## 2023-10-23 PROCEDURE — 2500000004 HC RX 250 GENERAL PHARMACY W/ HCPCS (ALT 636 FOR OP/ED)

## 2023-10-23 PROCEDURE — 1210000001 HC SEMI-PRIVATE ROOM DAILY

## 2023-10-23 PROCEDURE — 96372 THER/PROPH/DIAG INJ SC/IM: CPT

## 2023-10-23 PROCEDURE — 0D9670Z DRAINAGE OF STOMACH WITH DRAINAGE DEVICE, VIA NATURAL OR ARTIFICIAL OPENING: ICD-10-PCS

## 2023-10-23 PROCEDURE — 99222 1ST HOSP IP/OBS MODERATE 55: CPT

## 2023-10-23 PROCEDURE — C9113 INJ PANTOPRAZOLE SODIUM, VIA: HCPCS

## 2023-10-23 PROCEDURE — 74018 RADEX ABDOMEN 1 VIEW: CPT | Performed by: STUDENT IN AN ORGANIZED HEALTH CARE EDUCATION/TRAINING PROGRAM

## 2023-10-23 RX ORDER — POLYETHYLENE GLYCOL 3350 17 G/17G
17 POWDER, FOR SOLUTION ORAL DAILY
Status: CANCELLED | OUTPATIENT
Start: 2023-10-23

## 2023-10-23 RX ORDER — PANTOPRAZOLE SODIUM 40 MG/10ML
40 INJECTION, POWDER, LYOPHILIZED, FOR SOLUTION INTRAVENOUS DAILY
Status: DISCONTINUED | OUTPATIENT
Start: 2023-10-23 | End: 2023-10-24 | Stop reason: HOSPADM

## 2023-10-23 RX ORDER — ONDANSETRON HYDROCHLORIDE 2 MG/ML
4 INJECTION, SOLUTION INTRAVENOUS ONCE
Status: COMPLETED | OUTPATIENT
Start: 2023-10-23 | End: 2023-10-23

## 2023-10-23 RX ORDER — LIDOCAINE HYDROCHLORIDE 20 MG/ML
1 JELLY TOPICAL ONCE
Status: DISCONTINUED | OUTPATIENT
Start: 2023-10-23 | End: 2023-10-24 | Stop reason: HOSPADM

## 2023-10-23 RX ORDER — SODIUM CHLORIDE, SODIUM LACTATE, POTASSIUM CHLORIDE, CALCIUM CHLORIDE 600; 310; 30; 20 MG/100ML; MG/100ML; MG/100ML; MG/100ML
125 INJECTION, SOLUTION INTRAVENOUS CONTINUOUS
Status: DISCONTINUED | OUTPATIENT
Start: 2023-10-23 | End: 2023-10-24 | Stop reason: HOSPADM

## 2023-10-23 RX ORDER — ONDANSETRON HYDROCHLORIDE 2 MG/ML
4 INJECTION, SOLUTION INTRAVENOUS EVERY 6 HOURS PRN
Status: DISCONTINUED | OUTPATIENT
Start: 2023-10-23 | End: 2023-10-24 | Stop reason: HOSPADM

## 2023-10-23 RX ORDER — ENOXAPARIN SODIUM 100 MG/ML
40 INJECTION SUBCUTANEOUS EVERY 24 HOURS
Status: CANCELLED | OUTPATIENT
Start: 2023-10-23

## 2023-10-23 RX ORDER — ENOXAPARIN SODIUM 150 MG/ML
1 INJECTION SUBCUTANEOUS EVERY 12 HOURS
Status: DISCONTINUED | OUTPATIENT
Start: 2023-10-23 | End: 2023-10-24 | Stop reason: HOSPADM

## 2023-10-23 RX ADMIN — ONDANSETRON 4 MG: 2 INJECTION INTRAMUSCULAR; INTRAVENOUS at 01:47

## 2023-10-23 RX ADMIN — PANTOPRAZOLE SODIUM 40 MG: 40 INJECTION, POWDER, FOR SOLUTION INTRAVENOUS at 12:26

## 2023-10-23 RX ADMIN — SODIUM CHLORIDE, POTASSIUM CHLORIDE, SODIUM LACTATE AND CALCIUM CHLORIDE 125 ML/HR: 600; 310; 30; 20 INJECTION, SOLUTION INTRAVENOUS at 10:19

## 2023-10-23 RX ADMIN — ENOXAPARIN SODIUM 120 MG: 120 INJECTION SUBCUTANEOUS at 12:27

## 2023-10-23 RX ADMIN — SODIUM CHLORIDE, POTASSIUM CHLORIDE, SODIUM LACTATE AND CALCIUM CHLORIDE 125 ML/HR: 600; 310; 30; 20 INJECTION, SOLUTION INTRAVENOUS at 02:18

## 2023-10-23 RX ADMIN — ONDANSETRON 4 MG: 2 INJECTION INTRAMUSCULAR; INTRAVENOUS at 08:45

## 2023-10-23 SDOH — SOCIAL STABILITY: SOCIAL INSECURITY: DOES ANYONE TRY TO KEEP YOU FROM HAVING/CONTACTING OTHER FRIENDS OR DOING THINGS OUTSIDE YOUR HOME?: NO

## 2023-10-23 SDOH — SOCIAL STABILITY: SOCIAL INSECURITY: HAVE YOU HAD THOUGHTS OF HARMING ANYONE ELSE?: NO

## 2023-10-23 SDOH — SOCIAL STABILITY: SOCIAL INSECURITY: WERE YOU ABLE TO COMPLETE ALL THE BEHAVIORAL HEALTH SCREENINGS?: YES

## 2023-10-23 SDOH — SOCIAL STABILITY: SOCIAL INSECURITY: DO YOU FEEL ANYONE HAS EXPLOITED OR TAKEN ADVANTAGE OF YOU FINANCIALLY OR OF YOUR PERSONAL PROPERTY?: NO

## 2023-10-23 SDOH — SOCIAL STABILITY: SOCIAL INSECURITY: ARE THERE ANY APPARENT SIGNS OF INJURIES/BEHAVIORS THAT COULD BE RELATED TO ABUSE/NEGLECT?: NO

## 2023-10-23 SDOH — SOCIAL STABILITY: SOCIAL INSECURITY: ARE YOU OR HAVE YOU BEEN THREATENED OR ABUSED PHYSICALLY, EMOTIONALLY, OR SEXUALLY BY ANYONE?: NO

## 2023-10-23 SDOH — SOCIAL STABILITY: SOCIAL INSECURITY: ABUSE: ADULT

## 2023-10-23 SDOH — SOCIAL STABILITY: SOCIAL INSECURITY: DO YOU FEEL UNSAFE GOING BACK TO THE PLACE WHERE YOU ARE LIVING?: NO

## 2023-10-23 SDOH — SOCIAL STABILITY: SOCIAL INSECURITY: HAS ANYONE EVER THREATENED TO HURT YOUR FAMILY OR YOUR PETS?: NO

## 2023-10-23 ASSESSMENT — COGNITIVE AND FUNCTIONAL STATUS - GENERAL
DAILY ACTIVITIY SCORE: 24
DAILY ACTIVITIY SCORE: 24
PATIENT BASELINE BEDBOUND: NO
MOBILITY SCORE: 24
MOBILITY SCORE: 24

## 2023-10-23 ASSESSMENT — ACTIVITIES OF DAILY LIVING (ADL)
BATHING: INDEPENDENT
HEARING - RIGHT EAR: FUNCTIONAL
LACK_OF_TRANSPORTATION: PATIENT DECLINED
HEARING - LEFT EAR: FUNCTIONAL
DRESSING YOURSELF: INDEPENDENT
WALKS IN HOME: INDEPENDENT
ADEQUATE_TO_COMPLETE_ADL: YES
GROOMING: INDEPENDENT
FEEDING YOURSELF: INDEPENDENT
JUDGMENT_ADEQUATE_SAFELY_COMPLETE_DAILY_ACTIVITIES: YES
TOILETING: INDEPENDENT
PATIENT'S MEMORY ADEQUATE TO SAFELY COMPLETE DAILY ACTIVITIES?: YES

## 2023-10-23 ASSESSMENT — ENCOUNTER SYMPTOMS
ABDOMINAL PAIN: 0
SORE THROAT: 0
HEMATURIA: 0
MYALGIAS: 0
HEADACHES: 0
NAUSEA: 1
COUGH: 0
LIGHT-HEADEDNESS: 0
CHEST TIGHTNESS: 0
PHOTOPHOBIA: 0
DIZZINESS: 0
EYE REDNESS: 0
DIARRHEA: 0
WOUND: 0
FLANK PAIN: 0
PALPITATIONS: 0
FATIGUE: 0
NUMBNESS: 0
CONSTIPATION: 0
BLOOD IN STOOL: 0
DYSURIA: 0
DIFFICULTY URINATING: 0
ABDOMINAL DISTENTION: 0
NECK PAIN: 0
SEIZURES: 0
SHORTNESS OF BREATH: 0
CHILLS: 0
EYE PAIN: 0
FEVER: 0
WHEEZING: 0
ARTHRALGIAS: 0
FREQUENCY: 0
DIAPHORESIS: 0
SINUS PAIN: 0
APPETITE CHANGE: 0
SINUS PRESSURE: 0
BACK PAIN: 0
WEAKNESS: 0
VOMITING: 0
TROUBLE SWALLOWING: 0
TREMORS: 0

## 2023-10-23 ASSESSMENT — PATIENT HEALTH QUESTIONNAIRE - PHQ9
2. FEELING DOWN, DEPRESSED OR HOPELESS: NOT AT ALL
SUM OF ALL RESPONSES TO PHQ9 QUESTIONS 1 & 2: 0
1. LITTLE INTEREST OR PLEASURE IN DOING THINGS: NOT AT ALL

## 2023-10-23 ASSESSMENT — LIFESTYLE VARIABLES
AUDIT-C TOTAL SCORE: 0
AUDIT-C TOTAL SCORE: 0
HOW OFTEN DO YOU HAVE 6 OR MORE DRINKS ON ONE OCCASION: NEVER
HOW MANY STANDARD DRINKS CONTAINING ALCOHOL DO YOU HAVE ON A TYPICAL DAY: PATIENT DOES NOT DRINK
SKIP TO QUESTIONS 9-10: 1
HOW OFTEN DO YOU HAVE A DRINK CONTAINING ALCOHOL: NEVER

## 2023-10-23 ASSESSMENT — PAIN SCALES - GENERAL
PAINLEVEL_OUTOF10: 0 - NO PAIN
PAINLEVEL_OUTOF10: 1
PAINLEVEL_OUTOF10: 0 - NO PAIN
PAINLEVEL_OUTOF10: 0 - NO PAIN

## 2023-10-23 ASSESSMENT — PAIN - FUNCTIONAL ASSESSMENT: PAIN_FUNCTIONAL_ASSESSMENT: 0-10

## 2023-10-23 NOTE — H&P
History Of Present Illness  Jovanny Yap is a 73 y.o. male with PMH significant for recurrent SBO, HTN, HLD, kidney lesion, chronic fatigue syndrome, obesity, BPH, recent DVT (on Xarelto).  Patient presented to the ED on 10/22/2023 after feeling symptoms that he felt were concerning for small bowel obstruction.  These are symptoms that he felt like he had had in the past.  The symptoms consisted of abdominal discomfort, nausea without vomiting.  In the ED patient was evaluated, general surgery was consulted, and due to patient feeling better he was to be discharged home with clear liquid diet.  However, patient developed nausea without vomiting in the ED so as per surgery's recommendations he was to be admitted.  Of note, patient had 3 bowel movements in the ED nonbloody nonmelanotic.    In the ED:  -Vitals: Temp 97.3 F, HR 67, RR 16, /80, SPO2 99% RA  -Labs: CMP: Glucose 116, otherwise unremarkable             CBC: Unremarkable              UA: 1+ blood, 1+ leukocyte esterase  -Imaging: CT abdomen pelvis with contrast: Stable bowel-gas pattern suggestive of partial or indeterminate obstruction likely related to strictures or adhesions.  Stable significant diastases of the abdominal wall through which multiple loops of bowel protrude the hernia does not appear to be the cause of the obstruction.  -Interventions: NG tube placed, Zofran, surgery consulted Dr. Kemp.    PMH: Recurrent SBO, HTN, HLD, kidney lesion, chronic fatigue syndrome, BPH, obesity, recent DVT (on Xarelto)  Allergies: NKA  FH: Mother: Stroke, father: CAD, brother: Alive and well  SxH: Colonoscopy, blepharoplasty, right hemicolectomy  SH: Denies tobacco use, endorses social EtOH use, denies illicit drug use.  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 wife, and is a retired principal    CODE STATUS: Full code     Past Medical History  Past Medical History:   Diagnosis Date    Body mass index (BMI) 36.0-36.9, adult 02/15/2021    Body mass index (BMI) of 36.0 to  36.9 in adult    Unspecified blepharoconjunctivitis, left eye 09/15/2021    Blepharoconjunctivitis of left eye       Surgical History  Past Surgical History:   Procedure Laterality Date    COLONOSCOPY  11/26/2012    Complete Colonoscopy    EYE SURGERY  10/08/2013    Eye Surgery    OTHER SURGICAL HISTORY  11/26/2012    Right Hemicolectomy        Social History  He reports that he has never smoked. He has never used smokeless tobacco. He reports that he does not currently use alcohol. He reports that he does not use drugs.    Family History  Family History   Problem Relation Name Age of Onset    Stroke Mother      Coronary artery disease Father          Allergies  Patient has no known allergies.    Review of Systems   Constitutional:  Negative for appetite change, chills, diaphoresis, fatigue and fever.   HENT:  Negative for congestion, ear pain, sinus pressure, sinus pain, sore throat and trouble swallowing.    Eyes:  Negative for photophobia, pain, redness and visual disturbance.   Respiratory:  Negative for cough, chest tightness, shortness of breath and wheezing.    Cardiovascular:  Negative for chest pain, palpitations and leg swelling.   Gastrointestinal:  Positive for nausea. Negative for abdominal distention, abdominal pain, blood in stool, constipation, diarrhea and vomiting.   Genitourinary:  Negative for difficulty urinating, dysuria, flank pain, frequency, hematuria and urgency.   Musculoskeletal:  Negative for arthralgias, back pain, myalgias and neck pain.   Skin:  Negative for pallor, rash and wound.   Neurological:  Negative for dizziness, tremors, seizures, syncope, weakness, light-headedness, numbness and headaches.        Physical Exam  General: Not in acute distress, A&O x 3, alert, cooperative, well-developed  HEENT: Normocephalic, atraumatic, EOMI, moist mucous membranes  Neck: Neck supple, trachea midline, no evidence of trauma  Cardiovascular: RRR, S1 and S2 appreciated, no murmurs rubs gallops  "appreciated, distal pulses 2+ bilaterally (radial and dorsalis pedis)  Respiratory: Vesicular breath sounds appreciated bilaterally, no adventitious sounds appreciated, no increased work of breathing on room air  GI: Abdomen soft, nondistended, nontender to palpation, bowel sounds present  Extremities: 1+ edema appreciated in left lower extremity, no cyanosis  Neuro: A&O X3, no focal deficits, strength and sensation intact bilaterally  Skin: Warm and dry, without lesions or rashes    Last Recorded Vitals  Blood pressure 144/68, pulse 71, temperature 37 °C (98.6 °F), resp. rate 16, height 1.85 m (6' 0.84\"), weight 119 kg (262 lb 9.1 oz), SpO2 99 %.    Relevant Results    Scheduled medications  lidocaine, 1 Application, Topical, Once  ondansetron, 4 mg, intravenous, Once      Continuous medications  lactated Ringer's, 125 mL/hr      PRN medications  CT abdomen pelvis w IV contrast    Result Date: 10/22/2023  Interpreted By:  Josefa Anne, STUDY: CT ABDOMEN PELVIS W IV CONTRAST;  10/22/2023 10:16 pm   INDICATION: Signs/Symptoms:Abdominal pain, hx bowel obstruction.   COMPARISON: 08/21/2023   ACCESSION NUMBER(S): [id number]   ORDERING CLINICIAN: MARY CARMEN BARTLETT   TECHNIQUE: Axial CT images of the abdomen and pelvis with coronal and sagittal reconstructed images obtained after intravenous administration of contrast   FINDINGS: LOWER CHEST: No acute abnormality of the lung bases. BONES: No acute osseous abnormality. Mild-to-moderate diffuse degenerative disc changes and lumbar facet arthropathy. ABDOMINAL WALL: Stable diastasis of the rectus abdominus through which multiple loops of small bowel and transverse colon protrudes. Additional focal fat containing hernia in the left ventral abdominal wall. No evidence of hernia contributing to bowel obstruction.   ABDOMEN:   LIVER: Diffuse low attenuation suggesting hepatic steatosis. BILE DUCTS: No biliary dilatation. GALLBLADDER: No calcified gallstones. No pericholecystic " inflammatory changes. PANCREAS: Within normal limits. SPLEEN: Within normal limits. ADRENALS: Within normal limits. KIDNEYS and URETERS: Symmetric renal enhancement. No hydronephrosis or perinephric fluid collection. Stable 3 cm lesion in the lower pole left kidney has indeterminate characteristics on CT, but previously biopsied. Stable bilateral renal cysts.     VESSELS: No aortic aneurysm. RETROPERITONEUM: No pathologically enlarged retroperitoneal lymph nodes.   PELVIS:   REPRODUCTIVE ORGANS: No pelvic masses. BLADDER: Within normal limits.   BOWEL: The stomach is mildly distended. Right hemicolectomy:. Bowel-gas pattern is overall similar to prior, with borderline dilated distal ileal segment with fecalized contents suggesting stasis and relatively mild fluid distention of multiple additional loops of small bowel, alternating with areas of narrowing, suggesting partial or intermittent obstruction. There is colonic diverticulosis without evidence of acute diverticulitis. PERITONEUM: No ascites or free air, no fluid collection.       Stable bowel-gas pattern suggestive of partial or intermittent obstruction, likely related to strictures or adhesions.   Stable significant diastasis of the abdominal wall through which multiple loops of bowel protrude. The hernia does not appear to be the cause of obstruction.   Additional stable chronic findings as above.   MACRO: None   Signed by: Josefa Anne 10/22/2023 10:35 PM Dictation workstation:   [id number]   Results for orders placed or performed during the hospital encounter of 10/22/23 (from the past 24 hour(s))   CBC   Result Value Ref Range    WBC 9.6 4.4 - 11.3 x10*3/uL    nRBC 0.0 0.0 - 0.0 /100 WBCs    RBC 4.99 4.50 - 5.90 x10*6/uL    Hemoglobin 16.1 13.5 - 17.5 g/dL    Hematocrit 47.3 41.0 - 52.0 %    MCV 95 80 - 100 fL    MCH 32.3 26.0 - 34.0 pg    MCHC 34.0 32.0 - 36.0 g/dL    RDW 12.7 11.5 - 14.5 %    Platelets 157 150 - 450 x10*3/uL    MPV 10.0 7.5 - 11.5 fL    Comprehensive metabolic panel   Result Value Ref Range    Glucose 116 (H) 74 - 99 mg/dL    Sodium 140 136 - 145 mmol/L    Potassium 4.0 3.5 - 5.3 mmol/L    Chloride 103 98 - 107 mmol/L    Bicarbonate 30 21 - 32 mmol/L    Anion Gap 11 10 - 20 mmol/L    Urea Nitrogen 18 6 - 23 mg/dL    Creatinine 1.17 0.50 - 1.30 mg/dL    eGFR 66 >60 mL/min/1.73m*2    Calcium 9.5 8.6 - 10.3 mg/dL    Albumin 4.4 3.4 - 5.0 g/dL    Alkaline Phosphatase 62 33 - 136 U/L    Total Protein 7.0 6.4 - 8.2 g/dL    AST 21 9 - 39 U/L    Bilirubin, Total 0.6 0.0 - 1.2 mg/dL    ALT 27 10 - 52 U/L   Lipase   Result Value Ref Range    Lipase 36 9 - 82 U/L   Urinalysis with Reflex Microscopic and Culture   Result Value Ref Range    Color, Urine Yellow Straw, Yellow    Appearance, Urine Clear Clear    Specific Gravity, Urine 1.021 1.005 - 1.035    pH, Urine 5.0 5.0, 5.5, 6.0, 6.5, 7.0, 7.5, 8.0    Protein, Urine NEGATIVE NEGATIVE mg/dL    Glucose, Urine NEGATIVE NEGATIVE mg/dL    Blood, Urine SMALL (1+) (A) NEGATIVE    Ketones, Urine NEGATIVE NEGATIVE mg/dL    Bilirubin, Urine NEGATIVE NEGATIVE    Urobilinogen, Urine <2.0 <2.0 mg/dL    Nitrite, Urine NEGATIVE NEGATIVE    Leukocyte Esterase, Urine TRACE (A) NEGATIVE   Extra Urine Gray Tube   Result Value Ref Range    Extra Tube Hold for add-ons.    Microscopic Only, Urine   Result Value Ref Range    WBC, Urine 1-5 1-5, NONE /HPF    RBC, Urine 1-2 NONE, 1-2, 3-5 /HPF          Assessment/Plan   Principal Problem:    Partial small bowel obstruction (CMS/HCC)  Active Problems:    Benign essential hypertension    Chronic fatigue syndrome    Enlarged prostate    Hyperlipidemia    Acute deep vein thrombosis (DVT) of femoral vein of left lower extremity (CMS/HCC)    Nausea and vomiting    Intestinal adhesions with partial obstruction (CMS/HCC)  Patient is a 73-year-old male with PMH significant for recurrent SBO, HTN, HLD, kidney lesion, chronic fatigue syndrome, BPH, obesity, recent DVT (on Xarelto).  Patient  presented to the ED on 10/22/2023 after feeling symptoms that he felt were concerning for small bowel obstruction.  In the ED, CTAP with IV contrast was obtained illustrating concern for partial/developing SBO.  At that time general surgery was contacted (Dr. Kemp), and they recommended that patient was stable for discharge; however, patient developed nausea and as per general surgery's recommendations patient was admitted.  Patient was admitted to medicine for evaluation and management of developing SBO.    1.  Developing SBO: Patient has a history of recurrent SBO, presented with symptoms that he described felt like previous SBO.  -CTAP with IV contrast illustrated nonobstructive/developing SBO  -General surgery consulted Dr. Kemp appreciate recommendations  -NG tube placed in the ED  -Patient n.p.o. in case of procedure tomorrow  -Zofran 4 mg every 6 hours as needed for nausea  -Home Xarelto held in the setting of possible procedure tomorrow.    2. HTN  3.  HLD  4.  BPH  5.  Chronic fatigue syndrome  -Continue home medications once med rec is complete    6.  DVT of LLE  -Held home Xarelto in setting of possible procedure tomorrow    7.  Solitary kidney lesion: Diagnosed during last admission, patient was supposed to go to MRI tomorrow  -Reschedule outpatient MRI    IVF: None at this time  Diet: N.p.o.  DVT prophylaxis: SCDs/ambulate  Dispo: Anticipate 2-day hospital stay  Consults: General surgery, Dr. Kemp    CODE STATUS: Full code    Assessment and plan discussed with senior resident and to be discussed with attending.    Duglas Diehl MD  Internal medicine PGY 1    Patient seen and examined independently of intern physician.    Rick Hopson DO PGY-2  Internal Medicine [facility]

## 2023-10-23 NOTE — ED PROVIDER NOTES
HPI   Chief Complaint   Patient presents with    Abdominal Pain     Has a hx of bowel obstructions.  Was advised if he feels one coming on to go to ER.  Thinks he may be getting another one.  Last bowel movement was this am       Patient is a 73-year-old male with history of DVT on Xarelto, hyperlipidemia who presents to the emergency department for abdominal pain.  Patient was sitting home watching television when he felt a strange cramping in his abdomen.  He has history of 2 prior bowel obstructions, the most recent one several months ago which was treated conservatively with an NG tube.  He is concerned again about bowel obstruction so he comes to the emergency department.  He does not have any nausea at this time and the abdominal pain has improved.  He last had a bowel movement this morning and did pass gas throughout the day, but has not had any movements or flatulence since the pain started.  Patient reports that his first bowel obstruction occurred after colonoscopy, and that his second obstruction was a result of the adhesions caused by the partial colectomy after that first obstruction.  He otherwise feels fine, without any fevers, chest pain, shortness of breath, cough, dysuria.      History provided by:  Patient and medical records                      McRoberts Coma Scale Score: 15                  Patient History   Past Medical History:   Diagnosis Date    Body mass index (BMI) 36.0-36.9, adult 02/15/2021    Body mass index (BMI) of 36.0 to 36.9 in adult    Unspecified blepharoconjunctivitis, left eye 09/15/2021    Blepharoconjunctivitis of left eye     Past Surgical History:   Procedure Laterality Date    COLONOSCOPY  11/26/2012    Complete Colonoscopy    EYE SURGERY  10/08/2013    Eye Surgery    OTHER SURGICAL HISTORY  11/26/2012    Right Hemicolectomy     Family History   Problem Relation Name Age of Onset    Stroke Mother      Coronary artery disease Father       Social History     Tobacco Use     Smoking status: Never    Smokeless tobacco: Never   Vaping Use    Vaping Use: Never used   Substance Use Topics    Alcohol use: Not Currently    Drug use: Never       Physical Exam   ED Triage Vitals [10/22/23 1919]   Temp Heart Rate Resp BP   36.3 °C (97.3 °F) 67 16 152/80      SpO2 Temp Source Heart Rate Source Patient Position   99 % Temporal Monitor Sitting      BP Location FiO2 (%)     Left arm --       Physical Exam  Vitals and nursing note reviewed.   Constitutional:       General: He is not in acute distress.     Appearance: He is well-developed.   HENT:      Head: Normocephalic and atraumatic.      Right Ear: External ear normal.      Left Ear: External ear normal.      Nose: Nose normal.   Eyes:      General: No scleral icterus.     Conjunctiva/sclera: Conjunctivae normal.      Pupils: Pupils are equal, round, and reactive to light.   Cardiovascular:      Rate and Rhythm: Normal rate and regular rhythm.      Heart sounds: No murmur heard.  Pulmonary:      Effort: Pulmonary effort is normal. No respiratory distress.      Breath sounds: Normal breath sounds.   Abdominal:      General: A surgical scar is present. There is distension.      Palpations: Abdomen is soft.      Tenderness: There is no abdominal tenderness.      Hernia: A hernia is present. Hernia is present in the ventral area.   Musculoskeletal:         General: No swelling.      Cervical back: Neck supple. No rigidity.   Skin:     General: Skin is warm and dry.   Neurological:      General: No focal deficit present.      Mental Status: He is alert.   Psychiatric:         Mood and Affect: Mood normal.         ED Course & MDM   ED Course as of 10/23/23 0437   [place] Oct 22, 2023   2236 Microscopic Only, Urine [GB]   2236 CBC  CBC has normal white blood cell count and stable H&H. [GB]   2236 Urinalysis with Reflex Microscopic and Culture(!)  UA reveals only trace leuk esterase negative.  Rest of its unremarkable.  Highly unlikely infectious [GB]   2237  Comprehensive metabolic panel(!)  Comprehensive unremarkable. [GB]   2238 CT scan is revealing a partial or intermittent bowel obstruction [GB]   2239 CT scan of the abdomen pelvis reveals an intermittent or partial bowel obstruction [GB]      ED Course User Index  [GB] Jhonny Rizvi DO         Diagnoses as of 10/23/23 0437   Intestinal adhesions with partial obstruction (CMS/HCC)       Medical Decision Making  Patient is a 73-year-old male who presents to the emergency department for abdominal pain and concern for bowel obstruction.  On arrival to the emergency department, he is afebrile and he medically stable.  His abdominal pain has resolved.  His abdomen is distended with hernia and prior scarring, but it is nontender and soft.  He does not have any nausea.  Lab work and CT imaging are obtained to evaluate for possible bowel obstruction.      Results for orders placed or performed during the hospital encounter of 10/22/23  -CBC:        Result                      Value             Ref Range           WBC                         9.6               4.4 - 11.3 x*       nRBC                        0.0               0.0 - 0.0 /1*       RBC                         4.99              4.50 - 5.90 *       Hemoglobin                  16.1              13.5 - 17.5 *       Hematocrit                  47.3              41.0 - 52.0 %       MCV                         95                80 - 100 fL         MCH                         32.3              26.0 - 34.0 *       MCHC                        34.0              32.0 - 36.0 *       RDW                         12.7              11.5 - 14.5 %       Platelets                   157               150 - 450 x1*       MPV                         10.0              7.5 - 11.5 fL  -Comprehensive metabolic panel:        Result                      Value             Ref Range           Glucose                     116 (H)           74 - 99 mg/dL       Sodium                       140               136 - 145 mm*       Potassium                   4.0               3.5 - 5.3 mm*       Chloride                    103               98 - 107 mmo*       Bicarbonate                 30                21 - 32 mmol*       Anion Gap                   11                10 - 20 mmol*       Urea Nitrogen               18                6 - 23 mg/dL        Creatinine                  1.17              0.50 - 1.30 *       eGFR                        66                >60 mL/min/1*       Calcium                     9.5               8.6 - 10.3 m*       Albumin                     4.4               3.4 - 5.0 g/*       Alkaline Phosphatase        62                33 - 136 U/L        Total Protein               7.0               6.4 - 8.2 g/*       AST                         21                9 - 39 U/L          Bilirubin, Total            0.6               0.0 - 1.2 mg*       ALT                         27                10 - 52 U/L    -Lipase:        Result                      Value             Ref Range           Lipase                      36                9 - 82 U/L     -Urinalysis with Reflex Microscopic and Culture:        Result                      Value             Ref Range           Color, Urine                Yellow            Straw, Yellow       Appearance, Urine           Clear             Clear               Specific Gravity, Urine     1.021             1.005 - 1.035       pH, Urine                   5.0               5.0, 5.5, 6.*       Protein, Urine              NEGATIVE          NEGATIVE mg/*       Glucose, Urine              NEGATIVE          NEGATIVE mg/*       Blood, Urine                                  NEGATIVE        SMALL (1+) (A)       Ketones, Urine              NEGATIVE          NEGATIVE mg/*       Bilirubin, Urine            NEGATIVE          NEGATIVE            Urobilinogen, Urine         <2.0              <2.0 mg/dL          Nitrite, Urine              NEGATIVE           NEGATIVE            Leukocyte Esterase, Ur*     TRACE (A)         NEGATIVE       -Extra Urine Gray Tube:        Result                      Value             Ref Range           Extra Tube                                                    Hold for add-ons.  -Microscopic Only, Urine:        Result                      Value             Ref Range           WBC, Urine                  1-5               1-5, NONE /H*       RBC, Urine                  1-2               NONE, 1-2, 3*                 MACRO:    None.          Signed by: Tom Landeros 10/23/2023 2:20 AM    Dictation workstation:   [id number]     CT abdomen pelvis w IV contrast   Final Result    Stable bowel-gas pattern suggestive of partial or intermittent    obstruction, likely related to strictures or adhesions.          Stable significant diastasis of the abdominal wall through which    multiple loops of bowel protrude. The hernia does not appear to be    the cause of obstruction.          Additional stable chronic findings as above.          MACRO:    None          Signed by: Josefa Anne 10/22/2023 10:35 PM    Dictation workstation:   [id number]        We spoke to the surgeon on-call, Dr. Kemp, regarding the CT scan.  The patient has a benign presentation and feels well.  He does not have any nausea, vomiting, or abdominal pain.  He recommended conservative management with clear liquid diet, with discharge home and outpatient follow-up.    This is discussed with the patient and his wife.  They agree with this plan.  However, as they are getting ready to leave the hospital, he experiences severe nausea.  He then proceeds to have 2 bowel movements.  He did not have any vomiting, but continues to have nausea.  This change in clinical status is discussed with Dr. Kemp.  He recommends placement NG tube and admission to the hospital.    NG tube was placed.  X-rays obtained which shows that the gastric tube is within the stomach but the side port  is at the junction.  Recommended advancing by 4 cm.  This is done so and repeat x-ray of the abdomen shows appropriate placement of the NG tube.    Patient is admitted to the teaching service for further management and evaluation.    Tom Thrasher DO, PGY 3  Emergency Medicine Resident    Amount and/or Complexity of Data Reviewed  External Data Reviewed: notes.  Labs: ordered.  Radiology: ordered and independent interpretation performed.  ECG/medicine tests: ordered and independent interpretation performed.        Procedure  Procedures     Tom Thrasher DO  Resident  10/23/23 [id number]

## 2023-10-23 NOTE — CONSULTS
Consult        Chief Complaint possible bowel obstruction          History Of Present Illness  Jovanny Yap is a 73 y.o. male presenting with abdominal discomfort.    .     Patient says he began to get mild abdominal discomfort on Sunday afternoon.  He said he felt little bit crampy and bloated.  Patient has several prior episodes of bowel obstruction.  He thought this was similar to prior events    Patient says he started to hurt and had some mild cramps.  Again he was mildly nauseous but did not throw up.  States his bowels moved unremarkably Sunday morning.  Been to the emergency room.  He was noted to have a mildly distended abdomen.  Led to a CT scan 1 which demonstrates a minimally dilated small bowel with no obvious transition point.  It was thought possible to be an early possible partial small bowel obstruction.  Patient had an NG tube placed and was admitted to the hospital.    As of 1100, patient says he is passing flatus.  A bowel movement this morning.  He still feels a little bit bloated.  Again he says he is not hungry.  Again he has little bit of nausea but no emesis            Past Medical History  He has a past medical history of Body mass index (BMI) 36.0-36.9, adult (02/15/2021) and Unspecified blepharoconjunctivitis, left eye (09/15/2021).  Patient has a history of a DVT and is on chronic Xarelto    Borderline hypertension    Denies diabetes or coronary artery disease    Status post colon surgery greater than 10 years ago for complications related to a colonoscopy, reexplored approximately 1 week after surgery.    Known incisional hernia  Surgical History  He has a past surgical history that includes Colonoscopy (11/26/2012); Other surgical history (11/26/2012); and Eye surgery (10/08/2013).     Social History  He reports that he has never smoked. He has never used smokeless tobacco. He reports that he does not currently use alcohol. He reports that he does not use drugs.  Non-smoker  Family  History  Family History   Problem Relation Name Age of Onset    Stroke Mother      Coronary artery disease Father          Prior to Admission medications    Medication Sig Start Date End Date Taking? Authorizing Provider   cyanocobalamin (Vitamin B-12) 1,000 mcg tablet Take 1 tablet (1,000 mcg) by mouth once daily.    Historical Provider, MD   escitalopram (Lexapro) 5 mg tablet Take 1 tablet (5 mg) by mouth once daily. 10/19/23   Jhonny Cuevas DO   famotidine (Pepcid) 20 mg tablet Take 1 tablet (20 mg) by mouth once daily as needed. 5/23/23   Historical Provider, MD   pantoprazole (ProtoNix) 40 mg EC tablet Take 1 tablet (40 mg) by mouth once daily. 6/22/23   Jhonny Cuevas DO   rivaroxaban (Xarelto) 20 mg tablet Take 1 tablet (20 mg) by mouth once daily in the evening. Take with meals. Take with food.    Historical Provider, MD   simvastatin (Zocor) 20 mg tablet Take 1 tablet (20 mg) by mouth once daily at bedtime. 5/14/15   Historical Provider, MD   tamsulosin (Flomax) 0.4 mg 24 hr capsule Take 1 capsule (0.4 mg) by mouth once daily. 4/11/23   Jhonny Cuevas DO   buPROPion XL (Wellbutrin XL) 150 mg 24 hr tablet Take 1 tablet (150 mg) by mouth once daily.  10/23/23  Historical Provider, MD   escitalopram (Lexapro) 5 mg tablet Take 1 tablet (5 mg) by mouth once daily.  10/19/23  Historical Provider, MD   ibuprofen 600 mg tablet Take 1 tablet (600 mg) by mouth 3 times a day. 6/7/17 10/23/23  Historical Provider, MD   ondansetron ODT (Zofran-ODT) 4 mg disintegrating tablet Take 1 tablet (4 mg) by mouth every 8 hours if needed for nausea or vomiting.  10/23/23  Historical Provider, MD        Allergies  Patient has no known allergies.    Review of Systems   All other systems reviewed and are negative.       Physical Exam  Constitutional:       Comments: Male with NG tube, appears comfortable   HENT:      Head: Normocephalic and atraumatic.   Eyes:      Extraocular Movements: Extraocular movements intact.       "Pupils: Pupils are equal, round, and reactive to light.   Cardiovascular:      Rate and Rhythm: Normal rate and regular rhythm.   Pulmonary:      Effort: Pulmonary effort is normal.      Breath sounds: Normal breath sounds.   Abdominal:      Comments: Midline scar    Prominent incisional hernia, the hernia soft and nontender, again the midline from above the umbilicus to almost a symphysis pubis is 1 prominent hernia    No guarding or rebound    Difficult to evaluate for distention given the pre-existing hernia    Nontender    No inguinal hernia   Musculoskeletal:      Cervical back: Normal range of motion and neck supple.          Last Recorded Vitals  Blood pressure 144/68, pulse 71, temperature 37 °C (98.6 °F), resp. rate 16, height 1.85 m (6' 0.84\"), weight 119 kg (262 lb 9.1 oz), SpO2 99 %.    Relevant Results  Results for orders placed or performed during the hospital encounter of 10/22/23 (from the past 96 hour(s))   CBC   Result Value Ref Range    WBC 9.6 4.4 - 11.3 x10*3/uL    nRBC 0.0 0.0 - 0.0 /100 WBCs    RBC 4.99 4.50 - 5.90 x10*6/uL    Hemoglobin 16.1 13.5 - 17.5 g/dL    Hematocrit 47.3 41.0 - 52.0 %    MCV 95 80 - 100 fL    MCH 32.3 26.0 - 34.0 pg    MCHC 34.0 32.0 - 36.0 g/dL    RDW 12.7 11.5 - 14.5 %    Platelets 157 150 - 450 x10*3/uL    MPV 10.0 7.5 - 11.5 fL   Comprehensive metabolic panel   Result Value Ref Range    Glucose 116 (H) 74 - 99 mg/dL    Sodium 140 136 - 145 mmol/L    Potassium 4.0 3.5 - 5.3 mmol/L    Chloride 103 98 - 107 mmol/L    Bicarbonate 30 21 - 32 mmol/L    Anion Gap 11 10 - 20 mmol/L    Urea Nitrogen 18 6 - 23 mg/dL    Creatinine 1.17 0.50 - 1.30 mg/dL    eGFR 66 >60 mL/min/1.73m*2    Calcium 9.5 8.6 - 10.3 mg/dL    Albumin 4.4 3.4 - 5.0 g/dL    Alkaline Phosphatase 62 33 - 136 U/L    Total Protein 7.0 6.4 - 8.2 g/dL    AST 21 9 - 39 U/L    Bilirubin, Total 0.6 0.0 - 1.2 mg/dL    ALT 27 10 - 52 U/L   Lipase   Result Value Ref Range    Lipase 36 9 - 82 U/L   Urinalysis with " Reflex Microscopic and Culture   Result Value Ref Range    Color, Urine Yellow Straw, Yellow    Appearance, Urine Clear Clear    Specific Gravity, Urine 1.021 1.005 - 1.035    pH, Urine 5.0 5.0, 5.5, 6.0, 6.5, 7.0, 7.5, 8.0    Protein, Urine NEGATIVE NEGATIVE mg/dL    Glucose, Urine NEGATIVE NEGATIVE mg/dL    Blood, Urine SMALL (1+) (A) NEGATIVE    Ketones, Urine NEGATIVE NEGATIVE mg/dL    Bilirubin, Urine NEGATIVE NEGATIVE    Urobilinogen, Urine <2.0 <2.0 mg/dL    Nitrite, Urine NEGATIVE NEGATIVE    Leukocyte Esterase, Urine TRACE (A) NEGATIVE   Extra Urine Gray Tube   Result Value Ref Range    Extra Tube Hold for add-ons.    Microscopic Only, Urine   Result Value Ref Range    WBC, Urine 1-5 1-5, NONE /HPF    RBC, Urine 1-2 NONE, 1-2, 3-5 /HPF        I have reviewed the results          XR abdomen 1 view    Result Date: 10/23/2023  Interpreted By:  Josefa Anne, STUDY: XR ABDOMEN 1 VIEW;  10/23/2023 4:13 am   INDICATION: Signs/Symptoms:Repositioned NG tube.   COMPARISON: 10/23/2023 at 2:07 a.m.   ACCESSION NUMBER(S): [id number]   ORDERING CLINICIAN: MARY CARMEN BARTLETT   FINDINGS: The nasogastric tube has been advanced, now with tip and side port overlying the gastric body. No dilated bowel in the upper abdomen. The imaged lung bases are clear.       1.  Interval advancement of the nasogastric tube with tip and side port overlying the gastric body.   MACRO: None   Signed by: Josefa Anne 10/23/2023 4:28 AM Dictation workstation:   [id number]    XR abdomen 1 view    Result Date: 10/23/2023  Interpreted By:  Mary Carmen Landeros, STUDY: XR ABDOMEN 1 VIEW;  10/23/2023 2:15 am   INDICATION: Signs/Symptoms:NG tube placement.   COMPARISON: 10/22/2023 CT abdomen/pelvis   ACCESSION NUMBER(S): [id number]   ORDERING CLINICIAN: MARY CARMEN BARTLETT   FINDINGS: The NG/OG tube side hole is at the GE junction. Recommend advancing by 4 cm.   There are no dilated small bowel loops which may be due to fluid-filled lumen or resolution.   There  is a mild colonic stool burden.   There is no evidence of free intraperitoneal air within the limitations of a supine examination.   No pneumatosis or portal venous gas identified.   No acute osseous abnormalities.   The lung bases are clear.       The NG/OG tube side hole is at the GE junction. Recommend advancing by 4 cm.     MACRO: None.   Signed by: Tom Landeros 10/23/2023 2:20 AM Dictation workstation:   [id number]    CT abdomen pelvis w IV contrast    Result Date: 10/22/2023  Interpreted By:  Josefa Anne, STUDY: CT ABDOMEN PELVIS W IV CONTRAST;  10/22/2023 10:16 pm   INDICATION: Signs/Symptoms:Abdominal pain, hx bowel obstruction.   COMPARISON: 08/21/2023   ACCESSION NUMBER(S): [id number]   ORDERING CLINICIAN: TOM BARTLETT   TECHNIQUE: Axial CT images of the abdomen and pelvis with coronal and sagittal reconstructed images obtained after intravenous administration of contrast   FINDINGS: LOWER CHEST: No acute abnormality of the lung bases. BONES: No acute osseous abnormality. Mild-to-moderate diffuse degenerative disc changes and lumbar facet arthropathy. ABDOMINAL WALL: Stable diastasis of the rectus abdominus through which multiple loops of small bowel and transverse colon protrudes. Additional focal fat containing hernia in the left ventral abdominal wall. No evidence of hernia contributing to bowel obstruction.   ABDOMEN:   LIVER: Diffuse low attenuation suggesting hepatic steatosis. BILE DUCTS: No biliary dilatation. GALLBLADDER: No calcified gallstones. No pericholecystic inflammatory changes. PANCREAS: Within normal limits. SPLEEN: Within normal limits. ADRENALS: Within normal limits. KIDNEYS and URETERS: Symmetric renal enhancement. No hydronephrosis or perinephric fluid collection. Stable 3 cm lesion in the lower pole left kidney has indeterminate characteristics on CT, but previously biopsied. Stable bilateral renal cysts.     VESSELS: No aortic aneurysm. RETROPERITONEUM: No pathologically  enlarged retroperitoneal lymph nodes.   PELVIS:   REPRODUCTIVE ORGANS: No pelvic masses. BLADDER: Within normal limits.   BOWEL: The stomach is mildly distended. Right hemicolectomy:. Bowel-gas pattern is overall similar to prior, with borderline dilated distal ileal segment with fecalized contents suggesting stasis and relatively mild fluid distention of multiple additional loops of small bowel, alternating with areas of narrowing, suggesting partial or intermittent obstruction. There is colonic diverticulosis without evidence of acute diverticulitis. PERITONEUM: No ascites or free air, no fluid collection.       Stable bowel-gas pattern suggestive of partial or intermittent obstruction, likely related to strictures or adhesions.   Stable significant diastasis of the abdominal wall through which multiple loops of bowel protrude. The hernia does not appear to be the cause of obstruction.   Additional stable chronic findings as above.   MACRO: None   Signed by: Josefa Anne 10/22/2023 10:35 PM Dictation workstation:   [id number]       I have reviewed the images and the reports.         Assessment/    Partial small bowel obstruction    Incisional hernia    History of DVT on chronic Xarelto          Plan/    Think the patient has a partial small bowel obstruction related to the prior surgery.  He likely has adhesions within his abdomen.    Hernia is seen.  However do not think the hernia is incarcerated.    For the short-term patient would benefit from n.p.o., NG tube and general support.  We will repeat the x-rays.    Hernia should be considered for elective repair after resolution of this event          Rigoberto Kemp, [facility]

## 2023-10-23 NOTE — PROGRESS NOTES
Met with patient and wife at bedside. Admitted for partial bowel obstruction. Pt lives with spouse and was independent PTA with no HHC or DME. Surgical consult pending. Pt plans to return home on discharge. No new discharge needs anticipated. Family will provide transport home.

## 2023-10-23 NOTE — CARE PLAN
The patient's goals for the shift include      The clinical goals for the shift include decreased abdominal fullness  .

## 2023-10-24 ENCOUNTER — APPOINTMENT (OUTPATIENT)
Dept: RADIOLOGY | Facility: HOSPITAL | Age: 73
DRG: 390 | End: 2023-10-24
Payer: MEDICARE

## 2023-10-24 VITALS
RESPIRATION RATE: 16 BRPM | DIASTOLIC BLOOD PRESSURE: 85 MMHG | HEIGHT: 73 IN | HEART RATE: 55 BPM | OXYGEN SATURATION: 96 % | BODY MASS INDEX: 34.8 KG/M2 | WEIGHT: 262.57 LBS | SYSTOLIC BLOOD PRESSURE: 171 MMHG | TEMPERATURE: 97.5 F

## 2023-10-24 PROBLEM — K56.600 PARTIAL SMALL BOWEL OBSTRUCTION (MULTI): Status: RESOLVED | Noted: 2023-08-30 | Resolved: 2023-10-24

## 2023-10-24 PROBLEM — R11.2 NAUSEA AND VOMITING: Status: RESOLVED | Noted: 2023-08-30 | Resolved: 2023-10-24

## 2023-10-24 LAB
ALBUMIN SERPL BCP-MCNC: 3.7 G/DL (ref 3.4–5)
ALP SERPL-CCNC: 56 U/L (ref 33–136)
ALT SERPL W P-5'-P-CCNC: 20 U/L (ref 10–52)
ANION GAP SERPL CALC-SCNC: 13 MMOL/L (ref 10–20)
AST SERPL W P-5'-P-CCNC: 17 U/L (ref 9–39)
BACTERIA UR CULT: NO GROWTH
BILIRUB SERPL-MCNC: 0.8 MG/DL (ref 0–1.2)
BUN SERPL-MCNC: 15 MG/DL (ref 6–23)
CALCIUM SERPL-MCNC: 8.6 MG/DL (ref 8.6–10.3)
CHLORIDE SERPL-SCNC: 105 MMOL/L (ref 98–107)
CO2 SERPL-SCNC: 26 MMOL/L (ref 21–32)
CREAT SERPL-MCNC: 1.09 MG/DL (ref 0.5–1.3)
ERYTHROCYTE [DISTWIDTH] IN BLOOD BY AUTOMATED COUNT: 13.1 % (ref 11.5–14.5)
GFR SERPL CREATININE-BSD FRML MDRD: 72 ML/MIN/1.73M*2
GLUCOSE SERPL-MCNC: 87 MG/DL (ref 74–99)
HCT VFR BLD AUTO: 44 % (ref 41–52)
HGB BLD-MCNC: 14.7 G/DL (ref 13.5–17.5)
MAGNESIUM SERPL-MCNC: 2.09 MG/DL (ref 1.6–2.4)
MCH RBC QN AUTO: 31.8 PG (ref 26–34)
MCHC RBC AUTO-ENTMCNC: 33.4 G/DL (ref 32–36)
MCV RBC AUTO: 95 FL (ref 80–100)
NRBC BLD-RTO: 0 /100 WBCS (ref 0–0)
PLATELET # BLD AUTO: 141 X10*3/UL (ref 150–450)
PMV BLD AUTO: 10.4 FL (ref 7.5–11.5)
POTASSIUM SERPL-SCNC: 3.7 MMOL/L (ref 3.5–5.3)
PROT SERPL-MCNC: 5.9 G/DL (ref 6.4–8.2)
RBC # BLD AUTO: 4.62 X10*6/UL (ref 4.5–5.9)
SODIUM SERPL-SCNC: 140 MMOL/L (ref 136–145)
WBC # BLD AUTO: 6.5 X10*3/UL (ref 4.4–11.3)

## 2023-10-24 PROCEDURE — 99232 SBSQ HOSP IP/OBS MODERATE 35: CPT | Performed by: SURGERY

## 2023-10-24 PROCEDURE — 74019 RADEX ABDOMEN 2 VIEWS: CPT | Mod: FY

## 2023-10-24 PROCEDURE — 2500000004 HC RX 250 GENERAL PHARMACY W/ HCPCS (ALT 636 FOR OP/ED)

## 2023-10-24 PROCEDURE — 80053 COMPREHEN METABOLIC PANEL: CPT

## 2023-10-24 PROCEDURE — 96372 THER/PROPH/DIAG INJ SC/IM: CPT

## 2023-10-24 PROCEDURE — C9113 INJ PANTOPRAZOLE SODIUM, VIA: HCPCS

## 2023-10-24 PROCEDURE — 99238 HOSP IP/OBS DSCHRG MGMT 30/<: CPT

## 2023-10-24 PROCEDURE — 85027 COMPLETE CBC AUTOMATED: CPT

## 2023-10-24 PROCEDURE — 83735 ASSAY OF MAGNESIUM: CPT

## 2023-10-24 PROCEDURE — 74019 RADEX ABDOMEN 2 VIEWS: CPT | Performed by: RADIOLOGY

## 2023-10-24 PROCEDURE — 36415 COLL VENOUS BLD VENIPUNCTURE: CPT

## 2023-10-24 RX ADMIN — ENOXAPARIN SODIUM 120 MG: 120 INJECTION SUBCUTANEOUS at 12:49

## 2023-10-24 RX ADMIN — PANTOPRAZOLE SODIUM 40 MG: 40 INJECTION, POWDER, FOR SOLUTION INTRAVENOUS at 08:12

## 2023-10-24 RX ADMIN — SODIUM CHLORIDE, POTASSIUM CHLORIDE, SODIUM LACTATE AND CALCIUM CHLORIDE 125 ML/HR: 600; 310; 30; 20 INJECTION, SOLUTION INTRAVENOUS at 08:12

## 2023-10-24 RX ADMIN — ENOXAPARIN SODIUM 120 MG: 120 INJECTION SUBCUTANEOUS at 00:33

## 2023-10-24 RX ADMIN — SODIUM CHLORIDE, POTASSIUM CHLORIDE, SODIUM LACTATE AND CALCIUM CHLORIDE 125 ML/HR: 600; 310; 30; 20 INJECTION, SOLUTION INTRAVENOUS at 00:43

## 2023-10-24 ASSESSMENT — PAIN SCALES - GENERAL: PAINLEVEL_OUTOF10: 0 - NO PAIN

## 2023-10-24 NOTE — CARE PLAN
The patient's goals for the shift include      The clinical goals for the shift include Pt will have adequate rest this shift, pt will utilize his call light for his care needs.    .

## 2023-10-24 NOTE — DISCHARGE SUMMARY
Discharge Diagnosis  Partial small bowel obstruction (CMS/HCC)    Issues Requiring Follow-Up  Recurrent SBO  HTN  HLD  DVT on Xarelto    Discharge Meds     Your medication list        ASK your doctor about these medications        Instructions Last Dose Given Next Dose Due   cyanocobalamin 1,000 mcg tablet  Commonly known as: Vitamin B-12           escitalopram 5 mg tablet  Commonly known as: Lexapro      Take 1 tablet (5 mg) by mouth once daily.       famotidine 20 mg tablet  Commonly known as: Pepcid           pantoprazole 40 mg EC tablet  Commonly known as: ProtoNix      Take 1 tablet (40 mg) by mouth once daily.       simvastatin 20 mg tablet  Commonly known as: Zocor           tamsulosin 0.4 mg 24 hr capsule  Commonly known as: Flomax      Take 1 capsule (0.4 mg) by mouth once daily.       Xarelto 20 mg tablet  Generic drug: rivaroxaban                    Test Results Pending At Discharge  Pending Labs       No current pending labs.            Hospital Course   Mr. Yap is a 73-year-old male with PMH significant for recurrent SBO without requiring surgical intervention, HTN, HLD, kidney lesion, obesity, BPH, recent DVT on Xarelto, and prior open abdominal surgery secondary to colonoscopy findings who presented to [facility] ED with complaints of abdominal discomfort, and nausea found to have SBO.  Arrived to the ED hemodynamically stable with the exception of mild hypertension 152/80 saturating 99% on room air.  Labs unremarkable.  CT A/P demonstrating stable bowel gas pattern suggestive of partial/indeterminate obstruction likely related to stricturing or adhesions.  Significant diastases of the abdominal wall with multiple bowel loops consistent with hernia without incarceration or strangulation.  NG tube was placed in the ED, was given Zofran, and surgery consulted for further recommendations.    Throughout his hospitalization, he remained hemodynamically and vitally stable.  Intermittent hypertension which did  not require medicinal remedy.  Without decompression and n.p.o. status, patient improved.  He did have multiple bowel movements, and with resolution of nausea, at evaluation of surgeon, patient deemed stable for discharge.  No surgical intervention was necessary during this hospitalization.  Repeat KUB demonstrating resolution of obstruction.  He tolerated p.o. intake appropriately including clear liquid diet.  He will be discharged home in stable condition with strict instructions to follow-up with his PCP in the outpatient setting.  No new medication changes have been made in this hospitalization.    Pertinent Physical Exam At Time of Discharge  Physical Exam  VITALS: I have reviewed the vital signs.   GENERAL: Well developed adult in no acute distress.  Resting comfortably in bed.  Saturating appropriately on room air  NEURO: Alert and oriented x3, able to answer questions and follow commands. No motor or sensory deficits. Moves all extremities. Face is symmetric and expressive. No tremor.  EYES: PERRL. No scleral icterus or conjunctival injection. No discharge.   HENT: Normocephalic, atraumatic. Hearing is grossly intact. Nares grossly patent and without discharge. Mucous membranes moist.   NECK: No JVD. Patient moves neck without restriction.   CARDIO: Rhythm regular. Normal rate. No murmur, rub, or gallop. Pulses equal bilaterally in the upper and lower extremity. No lower extremity edema.   PULM: Lungs clear to auscultation in all fields. No wheezes, rales, or rhonchi. No conversational dyspnea. No splinting, stridor, or accessory muscle use.    GI: Abdomen is soft and moderately distended with obvious abdominal nonincarcerated nonstrangulated hernia. No tenderness to palpation. No guarding or rigidity.  Bowel sounds appreciated in all 4 quadrants though sluggish   : No suprapubic tenderness. No CVA tenderness.  EXTREMITIES: Symmetric muscle bulk. No joint swelling. No clubbing, cyanosis, or deformity.  Muscle strength 5/5 in b/l upper and lower extremities  SKIN: Warm and dry. Normal turgor. No rash or lesions appreciated.   PSYCH: Mood, affect, and interaction is appropriate to the setting. Not internally stimulated.    Outpatient Follow-Up  Future Appointments   Date Time Provider Department Center   10/30/2023  2:15 PM [facility] [id number] [facility] [id number] [facility]   11/2/2023  2:10 PM Rosendo Ziegler MD [id number] [facility]   11/8/2023 11:30 AM Albert Beard MD [id number] [facility]   12/7/2023  1:00 PM Rigoberto Kemp MD [id number] [facility]   1/24/2024  1:00 PM Abdiaziz Hood MD [id number] [facility]   4/9/2024  1:00 PM Jhonny Cuevas DO [id number] [facility]         Jace Garcia DO

## 2023-10-24 NOTE — PROGRESS NOTES
Jovanny SORIA Fracisco  [id number]   1950       Subjective/    Seen in follow-up partial small bowel obstruction    This morning the patient is comfortable.  Passing flatus and stool.  Denies nausea, vomiting, pain    Asked to go home                    Heart Rate:  [60-65]   Temp:  [36.4 °C (97.5 °F)-37.2 °C (99 °F)]   Resp:  [16-18]   BP: (145-182)/(74-95)   SpO2:  [93 %-98 %]     Intake/Output Summary (Last 24 hours) at 10/24/2023 0722  Last data filed at 10/24/2023 0322  Gross per 24 hour   Intake 1868.75 ml   Output 550 ml   Net 1318.75 ml          Physical Exam  Constitutional:       Comments: Appears comfortable   Abdominal:      Comments: Soft and nontender    Hernia again noted, nontender                Results for orders placed or performed during the hospital encounter of 10/22/23 (from the past 24 hour(s))   CBC   Result Value Ref Range    WBC 6.5 4.4 - 11.3 x10*3/uL    nRBC 0.0 0.0 - 0.0 /100 WBCs    RBC 4.62 4.50 - 5.90 x10*6/uL    Hemoglobin 14.7 13.5 - 17.5 g/dL    Hematocrit 44.0 41.0 - 52.0 %    MCV 95 80 - 100 fL    MCH 31.8 26.0 - 34.0 pg    MCHC 33.4 32.0 - 36.0 g/dL    RDW 13.1 11.5 - 14.5 %    Platelets 141 (L) 150 - 450 x10*3/uL    MPV 10.4 7.5 - 11.5 fL   Comprehensive metabolic panel   Result Value Ref Range    Glucose 87 74 - 99 mg/dL    Sodium 140 136 - 145 mmol/L    Potassium 3.7 3.5 - 5.3 mmol/L    Chloride 105 98 - 107 mmol/L    Bicarbonate 26 21 - 32 mmol/L    Anion Gap 13 10 - 20 mmol/L    Urea Nitrogen 15 6 - 23 mg/dL    Creatinine 1.09 0.50 - 1.30 mg/dL    eGFR 72 >60 mL/min/1.73m*2    Calcium 8.6 8.6 - 10.3 mg/dL    Albumin 3.7 3.4 - 5.0 g/dL    Alkaline Phosphatase 56 33 - 136 U/L    Total Protein 5.9 (L) 6.4 - 8.2 g/dL    AST 17 9 - 39 U/L    Bilirubin, Total 0.8 0.0 - 1.2 mg/dL    ALT 20 10 - 52 U/L   Magnesium   Result Value Ref Range    Magnesium 2.09 1.60 - 2.40 mg/dL        I reviewed the above studies      XR abdomen 1 view    Result Date: 10/23/2023  Interpreted By:  Dayana,  Josefa, STUDY: XR ABDOMEN 1 VIEW;  10/23/2023 4:13 am   INDICATION: Signs/Symptoms:Repositioned NG tube.   COMPARISON: 10/23/2023 at 2:07 a.m.   ACCESSION NUMBER(S): [id number]   ORDERING CLINICIAN: MARY CARMEN BARTLETT   FINDINGS: The nasogastric tube has been advanced, now with tip and side port overlying the gastric body. No dilated bowel in the upper abdomen. The imaged lung bases are clear.       1.  Interval advancement of the nasogastric tube with tip and side port overlying the gastric body.   MACRO: None   Signed by: Josefa Anne 10/23/2023 4:28 AM Dictation workstation:   [id number]    XR abdomen 1 view    Result Date: 10/23/2023  Interpreted By:  Mary Carmen Landeros, STUDY: XR ABDOMEN 1 VIEW;  10/23/2023 2:15 am   INDICATION: Signs/Symptoms:NG tube placement.   COMPARISON: 10/22/2023 CT abdomen/pelvis   ACCESSION NUMBER(S): [id number]   ORDERING CLINICIAN: MARY CARMEN BARTLETT   FINDINGS: The NG/OG tube side hole is at the GE junction. Recommend advancing by 4 cm.   There are no dilated small bowel loops which may be due to fluid-filled lumen or resolution.   There is a mild colonic stool burden.   There is no evidence of free intraperitoneal air within the limitations of a supine examination.   No pneumatosis or portal venous gas identified.   No acute osseous abnormalities.   The lung bases are clear.       The NG/OG tube side hole is at the GE junction. Recommend advancing by 4 cm.     MACRO: None.   Signed by: Mary Carmen Landeros 10/23/2023 2:20 AM Dictation workstation:   [id number]    CT abdomen pelvis w IV contrast    Result Date: 10/22/2023  Interpreted By:  Josefa Anne, STUDY: CT ABDOMEN PELVIS W IV CONTRAST;  10/22/2023 10:16 pm   INDICATION: Signs/Symptoms:Abdominal pain, hx bowel obstruction.   COMPARISON: 08/21/2023   ACCESSION NUMBER(S): [id number]   ORDERING CLINICIAN: MARY CARMEN BARTLETT   TECHNIQUE: Axial CT images of the abdomen and pelvis with coronal and sagittal reconstructed images obtained after  intravenous administration of contrast   FINDINGS: LOWER CHEST: No acute abnormality of the lung bases. BONES: No acute osseous abnormality. Mild-to-moderate diffuse degenerative disc changes and lumbar facet arthropathy. ABDOMINAL WALL: Stable diastasis of the rectus abdominus through which multiple loops of small bowel and transverse colon protrudes. Additional focal fat containing hernia in the left ventral abdominal wall. No evidence of hernia contributing to bowel obstruction.   ABDOMEN:   LIVER: Diffuse low attenuation suggesting hepatic steatosis. BILE DUCTS: No biliary dilatation. GALLBLADDER: No calcified gallstones. No pericholecystic inflammatory changes. PANCREAS: Within normal limits. SPLEEN: Within normal limits. ADRENALS: Within normal limits. KIDNEYS and URETERS: Symmetric renal enhancement. No hydronephrosis or perinephric fluid collection. Stable 3 cm lesion in the lower pole left kidney has indeterminate characteristics on CT, but previously biopsied. Stable bilateral renal cysts.     VESSELS: No aortic aneurysm. RETROPERITONEUM: No pathologically enlarged retroperitoneal lymph nodes.   PELVIS:   REPRODUCTIVE ORGANS: No pelvic masses. BLADDER: Within normal limits.   BOWEL: The stomach is mildly distended. Right hemicolectomy:. Bowel-gas pattern is overall similar to prior, with borderline dilated distal ileal segment with fecalized contents suggesting stasis and relatively mild fluid distention of multiple additional loops of small bowel, alternating with areas of narrowing, suggesting partial or intermittent obstruction. There is colonic diverticulosis without evidence of acute diverticulitis. PERITONEUM: No ascites or free air, no fluid collection.       Stable bowel-gas pattern suggestive of partial or intermittent obstruction, likely related to strictures or adhesions.   Stable significant diastasis of the abdominal wall through which multiple loops of bowel protrude. The hernia does not  appear to be the cause of obstruction.   Additional stable chronic findings as above.   MACRO: None   Signed by: Josefa Anne 10/22/2023 10:35 PM Dictation workstation:   [id number]       I have reviewed the images and the reports        Assessment/    Partial small bowel obstruction, clinically improved      Plan/          We will repeat abdominal x-rays.  If they look good we will DC NG tube and start oral feedings            Addendum at 0930.  Today's x-rays look nonobstructive.  NG tube will be removed.  Oral feedings will be started.  Anticipate discharge this afternoon    Rigoberto Kemp MD

## 2023-10-24 NOTE — PROGRESS NOTES
Jovanny Yap is a 73 y.o. male on day 1 of admission presenting with Partial small bowel obstruction (CMS/HCC).      Subjective   NAEON, Reports some nausea, but denies emesis. Had 1 BM today and yesterday.       Objective     Last Recorded Vitals  /81 (BP Location: Left arm, Patient Position: Lying)   Pulse 57   Temp 36.5 °C (97.7 °F) (Temporal)   Resp 16   Wt 119 kg (262 lb 9.1 oz)   SpO2 95%   Intake/Output last 3 Shifts:    Intake/Output Summary (Last 24 hours) at 10/24/2023 1101  Last data filed at 10/24/2023 0700  Gross per 24 hour   Intake 1868.75 ml   Output 650 ml   Net 1218.75 ml       Admission Weight  Weight: 119 kg (262 lb 9.1 oz) (10/22/23 1919)    Daily Weight  10/22/23 : 119 kg (262 lb 9.1 oz)    Image Results  XR abdomen 2 views supine and decubitus  Narrative: Interpreted By:  Wally Roberts,   STUDY:  XR ABDOMEN 2 VIEWS SUPINE AND DECUBITUS;  10/24/2023 9:31 am      INDICATION:  Signs/Symptoms:Follow-up partial small bowel obstruction.      COMPARISON:  10/23/2023      ACCESSION NUMBER(S):  [id number]      ORDERING CLINICIAN:  ALFONSO DRIVER      FINDINGS:  Nasogastric tube has been slightly retracted, sidehole now slightly  above the diaphragmatic hiatus. No dilated bowel is visualized. There  is increased gas in the colon to the level of the rectum.. No free  air is identified.      Impression: Nasogastric tube has been slightly retracted compared to yesterday.  Increased gas throughout the colon to the rectum suggests the  obstruction is resolving or has resolved.          Signed by: Wally Roberts 10/24/2023 10:36 AM  Dictation workstation:   [id number]      Physical Exam  Vitals reviewed.   Constitutional:       General: He is not in acute distress.     Appearance: Normal appearance. He is normal weight. He is not ill-appearing, toxic-appearing or diaphoretic.   HENT:      Head: Normocephalic and atraumatic.      Right Ear: External ear normal.      Left Ear: External ear normal.       Nose: Nose normal.      Comments: NGT in place on LIS.     Mouth/Throat:      Mouth: Mucous membranes are moist.      Pharynx: Oropharynx is clear.   Eyes:      Extraocular Movements: Extraocular movements intact.      Pupils: Pupils are equal, round, and reactive to light.   Cardiovascular:      Rate and Rhythm: Normal rate and regular rhythm.      Pulses: Normal pulses.      Heart sounds: Normal heart sounds. No murmur heard.     No friction rub. No gallop.   Pulmonary:      Effort: Pulmonary effort is normal.      Breath sounds: Normal breath sounds. No wheezing, rhonchi or rales.   Abdominal:      General: Abdomen is flat. Bowel sounds are normal. There is no distension.      Palpations: Abdomen is soft. There is no mass.      Tenderness: There is no abdominal tenderness.      Hernia: A hernia is present.      Comments: Midline incisional hernia   Musculoskeletal:         General: No swelling, tenderness, deformity or signs of injury. Normal range of motion.      Cervical back: Normal range of motion and neck supple.   Skin:     General: Skin is warm and dry.      Capillary Refill: Capillary refill takes less than 2 seconds.   Neurological:      General: No focal deficit present.      Mental Status: He is alert and oriented to person, place, and time.   Psychiatric:         Mood and Affect: Mood normal.         Behavior: Behavior normal.         Relevant Results    Scheduled medications  enoxaparin, 1 mg/kg, subcutaneous, q12h  lidocaine, 1 Application, Topical, Once  pantoprazole, 40 mg, intravenous, Daily      Continuous medications  lactated Ringer's, 125 mL/hr, Last Rate: 125 mL/hr (10/24/23 0812)      PRN medications  PRN medications: ondansetron  Results for orders placed or performed during the hospital encounter of 10/22/23 (from the past 24 hour(s))   CBC   Result Value Ref Range    WBC 6.5 4.4 - 11.3 x10*3/uL    nRBC 0.0 0.0 - 0.0 /100 WBCs    RBC 4.62 4.50 - 5.90 x10*6/uL    Hemoglobin 14.7 13.5  - 17.5 g/dL    Hematocrit 44.0 41.0 - 52.0 %    MCV 95 80 - 100 fL    MCH 31.8 26.0 - 34.0 pg    MCHC 33.4 32.0 - 36.0 g/dL    RDW 13.1 11.5 - 14.5 %    Platelets 141 (L) 150 - 450 x10*3/uL    MPV 10.4 7.5 - 11.5 fL   Comprehensive metabolic panel   Result Value Ref Range    Glucose 87 74 - 99 mg/dL    Sodium 140 136 - 145 mmol/L    Potassium 3.7 3.5 - 5.3 mmol/L    Chloride 105 98 - 107 mmol/L    Bicarbonate 26 21 - 32 mmol/L    Anion Gap 13 10 - 20 mmol/L    Urea Nitrogen 15 6 - 23 mg/dL    Creatinine 1.09 0.50 - 1.30 mg/dL    eGFR 72 >60 mL/min/1.73m*2    Calcium 8.6 8.6 - 10.3 mg/dL    Albumin 3.7 3.4 - 5.0 g/dL    Alkaline Phosphatase 56 33 - 136 U/L    Total Protein 5.9 (L) 6.4 - 8.2 g/dL    AST 17 9 - 39 U/L    Bilirubin, Total 0.8 0.0 - 1.2 mg/dL    ALT 20 10 - 52 U/L   Magnesium   Result Value Ref Range    Magnesium 2.09 1.60 - 2.40 mg/dL     XR abdomen 2 views supine and decubitus    Result Date: 10/24/2023  Interpreted By:  Wally Roberts, STUDY: XR ABDOMEN 2 VIEWS SUPINE AND DECUBITUS;  10/24/2023 9:31 am   INDICATION: Signs/Symptoms:Follow-up partial small bowel obstruction.   COMPARISON: 10/23/2023   ACCESSION NUMBER(S): [id number]   ORDERING CLINICIAN: ALFONSO DRIVER   FINDINGS: Nasogastric tube has been slightly retracted, sidehole now slightly above the diaphragmatic hiatus. No dilated bowel is visualized. There is increased gas in the colon to the level of the rectum.. No free air is identified.       Nasogastric tube has been slightly retracted compared to yesterday. Increased gas throughout the colon to the rectum suggests the obstruction is resolving or has resolved.     Signed by: Wally Roberts 10/24/2023 10:36 AM Dictation workstation:   [id number]    XR abdomen 1 view    Result Date: 10/23/2023  Interpreted By:  Josefa Anne, STUDY: XR ABDOMEN 1 VIEW;  10/23/2023 4:13 am   INDICATION: Signs/Symptoms:Repositioned NG tube.   COMPARISON: 10/23/2023 at 2:07 a.m.   ACCESSION NUMBER(S):  [id number]   ORDERING CLINICIAN: MARY CARMEN BARTLETT   FINDINGS: The nasogastric tube has been advanced, now with tip and side port overlying the gastric body. No dilated bowel in the upper abdomen. The imaged lung bases are clear.       1.  Interval advancement of the nasogastric tube with tip and side port overlying the gastric body.   MACRO: None   Signed by: Josefa Anne 10/23/2023 4:28 AM Dictation workstation:   [id number]    XR abdomen 1 view    Result Date: 10/23/2023  Interpreted By:  Mary Carmen Landeros, STUDY: XR ABDOMEN 1 VIEW;  10/23/2023 2:15 am   INDICATION: Signs/Symptoms:NG tube placement.   COMPARISON: 10/22/2023 CT abdomen/pelvis   ACCESSION NUMBER(S): [id number]   ORDERING CLINICIAN: MARY CARMEN BARTLETT   FINDINGS: The NG/OG tube side hole is at the GE junction. Recommend advancing by 4 cm.   There are no dilated small bowel loops which may be due to fluid-filled lumen or resolution.   There is a mild colonic stool burden.   There is no evidence of free intraperitoneal air within the limitations of a supine examination.   No pneumatosis or portal venous gas identified.   No acute osseous abnormalities.   The lung bases are clear.       The NG/OG tube side hole is at the GE junction. Recommend advancing by 4 cm.     MACRO: None.   Signed by: Mary Carmen Landeros 10/23/2023 2:20 AM Dictation workstation:   [id number]    CT abdomen pelvis w IV contrast    Result Date: 10/22/2023  Interpreted By:  Josefa Anne, STUDY: CT ABDOMEN PELVIS W IV CONTRAST;  10/22/2023 10:16 pm   INDICATION: Signs/Symptoms:Abdominal pain, hx bowel obstruction.   COMPARISON: 08/21/2023   ACCESSION NUMBER(S): [id number]   ORDERING CLINICIAN: MARY CARMEN BARTLETT   TECHNIQUE: Axial CT images of the abdomen and pelvis with coronal and sagittal reconstructed images obtained after intravenous administration of contrast   FINDINGS: LOWER CHEST: No acute abnormality of the lung bases. BONES: No acute osseous abnormality. Mild-to-moderate diffuse  degenerative disc changes and lumbar facet arthropathy. ABDOMINAL WALL: Stable diastasis of the rectus abdominus through which multiple loops of small bowel and transverse colon protrudes. Additional focal fat containing hernia in the left ventral abdominal wall. No evidence of hernia contributing to bowel obstruction.   ABDOMEN:   LIVER: Diffuse low attenuation suggesting hepatic steatosis. BILE DUCTS: No biliary dilatation. GALLBLADDER: No calcified gallstones. No pericholecystic inflammatory changes. PANCREAS: Within normal limits. SPLEEN: Within normal limits. ADRENALS: Within normal limits. KIDNEYS and URETERS: Symmetric renal enhancement. No hydronephrosis or perinephric fluid collection. Stable 3 cm lesion in the lower pole left kidney has indeterminate characteristics on CT, but previously biopsied. Stable bilateral renal cysts.     VESSELS: No aortic aneurysm. RETROPERITONEUM: No pathologically enlarged retroperitoneal lymph nodes.   PELVIS:   REPRODUCTIVE ORGANS: No pelvic masses. BLADDER: Within normal limits.   BOWEL: The stomach is mildly distended. Right hemicolectomy:. Bowel-gas pattern is overall similar to prior, with borderline dilated distal ileal segment with fecalized contents suggesting stasis and relatively mild fluid distention of multiple additional loops of small bowel, alternating with areas of narrowing, suggesting partial or intermittent obstruction. There is colonic diverticulosis without evidence of acute diverticulitis. PERITONEUM: No ascites or free air, no fluid collection.       Stable bowel-gas pattern suggestive of partial or intermittent obstruction, likely related to strictures or adhesions.   Stable significant diastasis of the abdominal wall through which multiple loops of bowel protrude. The hernia does not appear to be the cause of obstruction.   Additional stable chronic findings as above.   MACRO: None   Signed by: Josefa Anne 10/22/2023 10:35 PM Dictation workstation:    [id number]              Assessment/Plan        Principal Problem:    Partial small bowel obstruction (CMS/HCC)  Active Problems:    Benign essential hypertension    Chronic fatigue syndrome    Enlarged prostate    Hyperlipidemia    Acute deep vein thrombosis (DVT) of femoral vein of left lower extremity (CMS/HCC)    Nausea and vomiting    Intestinal adhesions with partial obstruction (CMS/HCC)    73-year-old male with PMH significant for recurrent SBO, HTN, HLD, kidney lesion, chronic fatigue syndrome, BPH, obesity, recent DVT (on Xarelto) admitted for developing SBO. NGT placed and NPO with improvement in symptoms subjectively.     #Partial SBO  - improving  - 1 BM today, passing flatus  - KUB: Increased gas throughout the colon to the rectum suggests the obstruction is resolving or has resolved  - Will remove NGT and PO challenge patient    #HTN  #HLD  #BPH  #Chronic fatigue syndrome  - continue home meds as tolerated    #DVT  - continue to hold Xarelto pending PO challenge    #Solitary kidney lesion  - pending MRI outpatient    IVF: None at this time  Diet: CLD  DVT prophylaxis: SCDs/ambulate  Dispo: Anticipate discharge pending PO challenge  Consults: General surgery, Dr. Kemp     CODE STATUS: Full code     Assessment and plan discussed with senior resident attending.        Adria Anderson, DO  Internal Medicine PGY 1

## 2023-10-24 NOTE — CARE PLAN
Problem: Fall/Injury  Goal: Not fall by end of shift  Outcome: Progressing  Goal: Be free from injury by end of the shift  Outcome: Progressing  Goal: Verbalize understanding of personal risk factors for fall in the hospital  Outcome: Progressing  Goal: Verbalize understanding of risk factor reduction measures to prevent injury from fall in the home  Outcome: Progressing  Goal: Use assistive devices by end of the shift  Outcome: Progressing  Goal: Pace activities to prevent fatigue by end of the shift  Outcome: Progressing     Problem: Pain  Goal: My pain/discomfort is manageable  Outcome: Progressing     Problem: Safety  Goal: Patient will be injury free during hospitalization  Outcome: Progressing  Goal: I will remain free of falls  Outcome: Progressing     Problem: Daily Care  Goal: Daily care needs are met  Outcome: Progressing     Problem: Psychosocial Needs  Goal: Demonstrates ability to cope with hospitalization/illness  Outcome: Progressing  Goal: Collaborate with me, my family, and caregiver to identify my specific goals  Outcome: Progressing     Problem: Discharge Barriers  Goal: My discharge needs are met  Outcome: Progressing     Problem: Pain - Adult  Goal: Verbalizes/displays adequate comfort level or baseline comfort level  Outcome: Progressing     Problem: Safety - Adult  Goal: Free from fall injury  Outcome: Progressing     Problem: Discharge Planning  Goal: Discharge to home or other facility with appropriate resources  Outcome: Progressing     Problem: Chronic Conditions and Co-morbidities  Goal: Patient's chronic conditions and co-morbidity symptoms are monitored and maintained or improved  Outcome: Progressing   The patient's goals for the shift include      The clinical goals for the shift include Pt will have adequate rest this shift, pt will utilize his call light for his care needs.    Over the shift, the patient received adequate rest.

## 2023-10-25 ENCOUNTER — PATIENT OUTREACH (OUTPATIENT)
Dept: PRIMARY CARE | Facility: CLINIC | Age: 73
End: 2023-10-25
Payer: MEDICARE

## 2023-10-25 NOTE — PROGRESS NOTES
Discharge Facility:[facility]  Discharge Diagnosis:Intestinal Adhesions w/partial obstruction   Admission Date:10/22/2023  Discharge Date: 10/24/2023    PCP Appointment Date:TBD  Specialist Appointment Date: 12/7/2023 Dr. Kemp/Surgeon regarding hernia  Hospital Encounter and Summary: Linked  See discharge assessment below for further details  Engagement  Call Start Time: 1455 (10/25/2023  2:56 PM)    Medications  Medications reviewed with patient/caregiver?: Yes (No change of medication) (10/25/2023  2:56 PM)  Is the patient having any side effects they believe may be caused by any medication additions or changes?: No (10/25/2023  2:56 PM)  Does the patient have all medications ordered at discharge?: Not applicable (10/25/2023  2:56 PM)  Care Management Interventions: No intervention needed (10/25/2023  2:56 PM)  Is the patient taking all medications as directed (includes completed medication regime)?: Yes (10/25/2023  2:56 PM)    Appointments  Does the patient have a primary care provider?: Yes (10/25/2023  2:56 PM)  Care Management Interventions: -- (They will call to schedule, driving when did outreach.) (10/25/2023  2:56 PM)  Has the patient kept scheduled appointments due by today?: Yes (10/25/2023  2:56 PM)    Patient Teaching  Does the patient have access to their discharge instructions?: Yes (10/25/2023  2:56 PM)  What is the patient's perception of their health status since discharge?: Improving (10/25/2023  2:56 PM)  Is the patient/caregiver able to teach back the hierarchy of who to call/visit for symptoms/problems? PCP, Specialist, Home Health nurse, Urgent Care, ED, 911: Yes (10/25/2023  2:56 PM)    Wrap Up  Wrap Up Additional Comments: -- (Kwadwo is feeling really good today, out for a drive. He states had good nights rest, eating well, having regular bm with no problem.) (10/25/2023  2:56 PM)

## 2023-10-25 NOTE — DOCUMENTATION CLARIFICATION NOTE
"    PATIENT:               SAMREEN PUENTE  ACCT #:                  [id number]  MRN:                       [id number]  :                       1950  ADMIT DATE:       10/22/2023 7:54 PM  DISCH DATE:        10/24/2023 7:00 PM  RESPONDING PROVIDER #:        [id number]          PROVIDER RESPONSE TEXT:    Low platelets not requiring treatment or evaluation    CDI QUERY TEXT:    [facility]_Abnormal Studies    Instruction:    Based on your assessment of the patient and the clinical information, please provide the requested documentation by clicking on the appropriate radio button and enter any additional information if prompted.    Question: Is there a diagnosis indicative of the lab values or image study    When answering this query, please exercise your independent professional judgment. The fact that a question is being asked, does not imply that any particular answer is desired or expected.    The patient's clinical indicators include:  Clinical Information: 74 y/o M admitted with SBO 2/2 abdominal adhesions    Clinical Indicators:    platelet:  10/22: 157  10/24: 141    ED note 10/22 Dr. Thrasher:  \"presents to the emergency department for abdominal pain.  CT scan is revealing a partial or intermittent bowel obstruction \"    surgery consult 10/23 Dr. Kemp: \"partial small bowel obstruction related to the prior surgery.  He likely has adhesions within his abdomen.  Hernia is seen however do not think is incarcerated.  \"    Treatment: daily labs, frequent monitoring of vital signs, trend platelet count    Risk Factors: SBO, SubQ lovenox BID, h/o DVT on xarelto  Options provided:  -- Thrombocytopenia is clinically significant and required treatment/monitoring  -- Low platelets not requiring treatment or evaluation  -- Other - I will add my own diagnosis  -- Refer to Clinical Documentation Reviewer    Query created by: Alessia Prather on 10/24/2023 8:05 AM      Electronically signed by:  ELISHA EMERSON MD 10/25/2023 4:02 " PM

## 2023-10-30 ENCOUNTER — ANCILLARY PROCEDURE (OUTPATIENT)
Dept: RADIOLOGY | Facility: CLINIC | Age: 73
End: 2023-10-30
Payer: MEDICARE

## 2023-10-30 DIAGNOSIS — N28.9 DISORDER OF KIDNEY AND URETER, UNSPECIFIED: Primary | ICD-10-CM

## 2023-10-30 PROCEDURE — 74183 MRI ABD W/O CNTR FLWD CNTR: CPT

## 2023-10-30 PROCEDURE — A9575 INJ GADOTERATE MEGLUMI 0.1ML: HCPCS | Performed by: UROLOGY

## 2023-10-30 PROCEDURE — 2550000001 HC RX 255 CONTRASTS: Performed by: UROLOGY

## 2023-10-30 PROCEDURE — 74183 MRI ABD W/O CNTR FLWD CNTR: CPT | Performed by: RADIOLOGY

## 2023-10-30 RX ORDER — GADOTERATE MEGLUMINE 376.9 MG/ML
19 INJECTION INTRAVENOUS
Status: COMPLETED | OUTPATIENT
Start: 2023-10-30 | End: 2023-10-30

## 2023-10-30 RX ADMIN — GADOTERATE MEGLUMINE 19 ML: 376.9 INJECTION INTRAVENOUS at 14:40

## 2023-11-02 ENCOUNTER — OFFICE VISIT (OUTPATIENT)
Dept: UROLOGY | Facility: CLINIC | Age: 73
End: 2023-11-02
Payer: MEDICARE

## 2023-11-02 DIAGNOSIS — D30.00 RENAL ONCOCYTOMA, UNSPECIFIED LATERALITY: Primary | ICD-10-CM

## 2023-11-02 PROCEDURE — 99213 OFFICE O/P EST LOW 20 MIN: CPT | Performed by: UROLOGY

## 2023-11-02 PROCEDURE — 1125F AMNT PAIN NOTED PAIN PRSNT: CPT | Performed by: UROLOGY

## 2023-11-02 PROCEDURE — 1111F DSCHRG MED/CURRENT MED MERGE: CPT | Performed by: UROLOGY

## 2023-11-02 PROCEDURE — 3074F SYST BP LT 130 MM HG: CPT | Performed by: UROLOGY

## 2023-11-02 PROCEDURE — 1159F MED LIST DOCD IN RCRD: CPT | Performed by: UROLOGY

## 2023-11-02 PROCEDURE — 3008F BODY MASS INDEX DOCD: CPT | Performed by: UROLOGY

## 2023-11-02 PROCEDURE — 3078F DIAST BP <80 MM HG: CPT | Performed by: UROLOGY

## 2023-11-02 PROCEDURE — 1036F TOBACCO NON-USER: CPT | Performed by: UROLOGY

## 2023-11-02 PROCEDURE — 1160F RVW MEDS BY RX/DR IN RCRD: CPT | Performed by: UROLOGY

## 2023-11-02 NOTE — PROGRESS NOTES
PRIOR NOTES  73-year-old male referred to me for possible renal lesion  PMHx: Anxiety, BMI 34, GERD, hydrocele, constipation post-op, DVT, pSBO  PSHx: Prior midline pincision for partial colectomy  On Xarelto  creatinine 1.01, hemoglobin 14.5 UA without RBCs  No cardiopulmonary history  No flank pain or hematuria.   The following images have been personally viewed and interpreted, results discussed with patient  MRI kidney 12/29/2022-bilateral large simple renal cyst, in the left mid pole there is what appears to be solid renal mass endophytic. 3.1 cm.  Single vein. 2 arteries     04/25/23 - [facility] - oncocytoma  MRI 10/30/23 - Enhancing endophytic mass 3.1cm unchanged in size since 5/3/23    UPDATED SUBJECTIVE HISTORY  11/02/23 - Recently had SBO, resolved w/ NG tube. No recent changes in health. Also L LE DVT on 6mo AC.     Past Medical History  He has a past medical history of Body mass index (BMI) 36.0-36.9, adult (02/15/2021) and Unspecified blepharoconjunctivitis, left eye (09/15/2021).    Surgical History  He has a past surgical history that includes Colonoscopy (11/26/2012); Other surgical history (11/26/2012); and Eye surgery (10/08/2013).     Social History  He reports that he has never smoked. He has never used smokeless tobacco. He reports that he does not currently use alcohol. He reports that he does not use drugs.    Family History  Family History   Problem Relation Name Age of Onset    Stroke Mother      Coronary artery disease Father          Allergies  Patient has no known allergies.    ROS: 12 system review was completed and is negative with the exception of those signs and symptoms noted in the history of present illness: A 12 system review was completed and is negative with the exception of those signs and symptoms noted in the history of present illness.     Exam:  General: in NAD, appears stated age  Head: normocephalic, atraumatic  Respiratory: normal effort, no use of accessory  muscles  Cardiovascular: no edema noted  Skin: normal turgor, no rashes  Neurologic: grossly intact, oriented to person/place/time  Psychiatric: mode and affect appropriate     Last Recorded Vitals  There were no vitals taken for this visit.    Lab Results   Component Value Date    PSASCREEN 0.90 09/15/2022    CREATININE 1.09 10/24/2023    HGB 14.7 10/24/2023         ASSESSMENT/PLAN:  #Renal oncocytoma  - continue surveillance  - FUV 6mo w/ MRI kidney beforehand  - discussed if no change in size, annual surveillance  - if small growth, 6mo surveillance    Rosendo Ziegler MD

## 2023-11-07 NOTE — PROGRESS NOTES
Subjective   Patient ID: Jovanny Yap is a 73 y.o. male who presents for FUV    HPI   Very pleasant 73 year old male FOLLOWING UP for evaluation of DVT in left lower extremity. He presented to the ER with a swollen left leg which was warm to touch, and his ankle was swollen and stiff. Pt was started on Xarelto 15mg and has been on Xarelto 20mg now for 3 months  Pt has had no prior DVTs. he has a hx of varicose veins. Pt has had a old blood clot in the left leg after a surgical procedure in the past.   No recent hx of trauma or injury.   Pt is UTD on his annual screenings.   Patient was on a road trip- 4 hours to [place] and back in July.   Patient is a non-smoker. He is an occasional drinker.     Review of Systems   Constitutional: Negative.    HENT: Negative.     Eyes: Negative.    Respiratory: Negative.     Cardiovascular: Negative.    Endocrine: Negative.    Musculoskeletal: Negative.    Skin: Negative.    Neurological: Negative.    Hematological: Negative.    Psychiatric/Behavioral: Negative.     All other systems reviewed and are negative.      Objective   BP (!) 162/92 (BP Location: Left arm, Patient Position: Sitting)   Pulse 68   Ht 1.829 m (6')   Wt 118 kg (260 lb)   SpO2 96%   BMI 35.26 kg/m²     Physical Exam  Vitals reviewed.   Constitutional:       Appearance: Normal appearance.   HENT:      Head: Normocephalic and atraumatic.   Cardiovascular:      Rate and Rhythm: Normal rate and regular rhythm.      Pulses: Normal pulses.      Heart sounds: Normal heart sounds.   Pulmonary:      Effort: Pulmonary effort is normal.      Breath sounds: Normal breath sounds.   Musculoskeletal:         General: Normal range of motion.      Right lower leg: Edema present.      Left lower leg: Edema present.   Neurological:      General: No focal deficit present.      Mental Status: He is alert and oriented to person, place, and time.   Psychiatric:         Mood and Affect: Mood normal.         Behavior:  Behavior normal.         Assessment/Plan     73 year old male following up with provoked left lower extremity DVT most likely from his 8-hours road trip. Pt is currently on Xarelto 20mg since 3 months. He denies any prior DVTs, but does have an occurrence of an old blood clot in the left leg after a procedure in the past. No recent hx of trauma or injury. Pt is UTD on his annual screenings. He is wearing compression stockings and leg swelling is minimal.      1- Get D-Dimer now, if negative, will discontinue Xarelto. Will get another D-Dimer in 1 month, if both negative, pt can remain off of anticoagulation and will be on aspirin 81mg.his DASH score is 1/4 aroud 4% annual recurrence risk rate  2- Continue with Compression stockings 15 to 20 mmHg bilateral. Care instructions provided.   3- Follow up in 1 month with another D-Dimer prior for re-assessment.      Scribe Attestation  By signing my name below, ISarah , Scribdenzel   attest that this documentation has been prepared under the direction and in the presence of Albert Beard MD.    .

## 2023-11-08 ENCOUNTER — PATIENT OUTREACH (OUTPATIENT)
Dept: PRIMARY CARE | Facility: CLINIC | Age: 73
End: 2023-11-08

## 2023-11-08 ENCOUNTER — OFFICE VISIT (OUTPATIENT)
Dept: CARDIOLOGY | Facility: CLINIC | Age: 73
End: 2023-11-08
Payer: MEDICARE

## 2023-11-08 ENCOUNTER — LAB (OUTPATIENT)
Dept: LAB | Facility: LAB | Age: 73
End: 2023-11-08
Payer: MEDICARE

## 2023-11-08 VITALS
WEIGHT: 260 LBS | DIASTOLIC BLOOD PRESSURE: 92 MMHG | OXYGEN SATURATION: 96 % | BODY MASS INDEX: 35.21 KG/M2 | SYSTOLIC BLOOD PRESSURE: 162 MMHG | HEART RATE: 68 BPM | HEIGHT: 72 IN

## 2023-11-08 DIAGNOSIS — I82.462 ACUTE DEEP VEIN THROMBOSIS (DVT) OF CALF MUSCLE VEIN OF LEFT LOWER EXTREMITY (MULTI): Primary | ICD-10-CM

## 2023-11-08 DIAGNOSIS — I82.462 ACUTE DEEP VEIN THROMBOSIS (DVT) OF CALF MUSCLE VEIN OF LEFT LOWER EXTREMITY (MULTI): ICD-10-CM

## 2023-11-08 LAB — D DIMER PPP FEU-MCNC: 295 NG/ML FEU

## 2023-11-08 PROCEDURE — 36415 COLL VENOUS BLD VENIPUNCTURE: CPT

## 2023-11-08 PROCEDURE — 1125F AMNT PAIN NOTED PAIN PRSNT: CPT | Performed by: INTERNAL MEDICINE

## 2023-11-08 PROCEDURE — 1160F RVW MEDS BY RX/DR IN RCRD: CPT | Performed by: INTERNAL MEDICINE

## 2023-11-08 PROCEDURE — 85379 FIBRIN DEGRADATION QUANT: CPT

## 2023-11-08 PROCEDURE — 1111F DSCHRG MED/CURRENT MED MERGE: CPT | Performed by: INTERNAL MEDICINE

## 2023-11-08 PROCEDURE — 3008F BODY MASS INDEX DOCD: CPT | Performed by: INTERNAL MEDICINE

## 2023-11-08 PROCEDURE — 1159F MED LIST DOCD IN RCRD: CPT | Performed by: INTERNAL MEDICINE

## 2023-11-08 PROCEDURE — 3077F SYST BP >= 140 MM HG: CPT | Performed by: INTERNAL MEDICINE

## 2023-11-08 PROCEDURE — 3080F DIAST BP >= 90 MM HG: CPT | Performed by: INTERNAL MEDICINE

## 2023-11-08 PROCEDURE — 1036F TOBACCO NON-USER: CPT | Performed by: INTERNAL MEDICINE

## 2023-11-08 PROCEDURE — 99213 OFFICE O/P EST LOW 20 MIN: CPT | Performed by: INTERNAL MEDICINE

## 2023-11-08 ASSESSMENT — ENCOUNTER SYMPTOMS
PSYCHIATRIC NEGATIVE: 1
ENDOCRINE NEGATIVE: 1
CARDIOVASCULAR NEGATIVE: 1
CONSTITUTIONAL NEGATIVE: 1
RESPIRATORY NEGATIVE: 1
MUSCULOSKELETAL NEGATIVE: 1
HEMATOLOGIC/LYMPHATIC NEGATIVE: 1
NEUROLOGICAL NEGATIVE: 1
EYES NEGATIVE: 1

## 2023-11-08 NOTE — PROGRESS NOTES
Unable to reach patient for call back after patient's follow up appointment with PCP.   ISABELLAM with call back number for patient to call if needed   If no voicemail available call attempts x 2 were made to contact the patient to assist with any questions or concerns patient may have.

## 2023-11-13 ENCOUNTER — TELEPHONE (OUTPATIENT)
Dept: PRIMARY CARE | Facility: CLINIC | Age: 73
End: 2023-11-13
Payer: MEDICARE

## 2023-11-14 ENCOUNTER — HOSPITAL ENCOUNTER (EMERGENCY)
Facility: HOSPITAL | Age: 73
Discharge: HOME | End: 2023-11-14
Attending: STUDENT IN AN ORGANIZED HEALTH CARE EDUCATION/TRAINING PROGRAM
Payer: MEDICARE

## 2023-11-14 ENCOUNTER — APPOINTMENT (OUTPATIENT)
Dept: RADIOLOGY | Facility: HOSPITAL | Age: 73
End: 2023-11-14
Payer: MEDICARE

## 2023-11-14 VITALS
SYSTOLIC BLOOD PRESSURE: 127 MMHG | HEART RATE: 58 BPM | DIASTOLIC BLOOD PRESSURE: 71 MMHG | OXYGEN SATURATION: 95 % | TEMPERATURE: 97 F | RESPIRATION RATE: 19 BRPM | HEIGHT: 72 IN | BODY MASS INDEX: 32.51 KG/M2 | WEIGHT: 240 LBS

## 2023-11-14 DIAGNOSIS — R53.1 GENERALIZED WEAKNESS: Primary | ICD-10-CM

## 2023-11-14 DIAGNOSIS — R11.0 NAUSEA: ICD-10-CM

## 2023-11-14 LAB
ALBUMIN SERPL BCP-MCNC: 4.3 G/DL (ref 3.4–5)
ALP SERPL-CCNC: 69 U/L (ref 33–136)
ALT SERPL W P-5'-P-CCNC: 27 U/L (ref 10–52)
ANION GAP SERPL CALC-SCNC: 12 MMOL/L (ref 10–20)
AST SERPL W P-5'-P-CCNC: 21 U/L (ref 9–39)
BASOPHILS # BLD AUTO: 0.02 X10*3/UL (ref 0–0.1)
BASOPHILS NFR BLD AUTO: 0.4 %
BILIRUB SERPL-MCNC: 0.8 MG/DL (ref 0–1.2)
BNP SERPL-MCNC: 44 PG/ML (ref 0–99)
BUN SERPL-MCNC: 15 MG/DL (ref 6–23)
CALCIUM SERPL-MCNC: 9.4 MG/DL (ref 8.6–10.3)
CARDIAC TROPONIN I PNL SERPL HS: 9 NG/L (ref 0–20)
CHLORIDE SERPL-SCNC: 103 MMOL/L (ref 98–107)
CO2 SERPL-SCNC: 28 MMOL/L (ref 21–32)
CREAT SERPL-MCNC: 1.11 MG/DL (ref 0.5–1.3)
EOSINOPHIL # BLD AUTO: 0.03 X10*3/UL (ref 0–0.4)
EOSINOPHIL NFR BLD AUTO: 0.6 %
ERYTHROCYTE [DISTWIDTH] IN BLOOD BY AUTOMATED COUNT: 12.7 % (ref 11.5–14.5)
FLUAV RNA RESP QL NAA+PROBE: NOT DETECTED
FLUBV RNA RESP QL NAA+PROBE: NOT DETECTED
GFR SERPL CREATININE-BSD FRML MDRD: 70 ML/MIN/1.73M*2
GLUCOSE SERPL-MCNC: 117 MG/DL (ref 74–99)
HCT VFR BLD AUTO: 47.6 % (ref 41–52)
HGB BLD-MCNC: 16.4 G/DL (ref 13.5–17.5)
IMM GRANULOCYTES # BLD AUTO: 0.01 X10*3/UL (ref 0–0.5)
IMM GRANULOCYTES NFR BLD AUTO: 0.2 % (ref 0–0.9)
LYMPHOCYTES # BLD AUTO: 1.02 X10*3/UL (ref 0.8–3)
LYMPHOCYTES NFR BLD AUTO: 20.2 %
MAGNESIUM SERPL-MCNC: 2.43 MG/DL (ref 1.6–2.4)
MCH RBC QN AUTO: 32.3 PG (ref 26–34)
MCHC RBC AUTO-ENTMCNC: 34.5 G/DL (ref 32–36)
MCV RBC AUTO: 94 FL (ref 80–100)
MONOCYTES # BLD AUTO: 0.36 X10*3/UL (ref 0.05–0.8)
MONOCYTES NFR BLD AUTO: 7.1 %
NEUTROPHILS # BLD AUTO: 3.62 X10*3/UL (ref 1.6–5.5)
NEUTROPHILS NFR BLD AUTO: 71.5 %
NRBC BLD-RTO: 0 /100 WBCS (ref 0–0)
PLATELET # BLD AUTO: 150 X10*3/UL (ref 150–450)
POTASSIUM SERPL-SCNC: 4 MMOL/L (ref 3.5–5.3)
PROT SERPL-MCNC: 6.8 G/DL (ref 6.4–8.2)
RBC # BLD AUTO: 5.07 X10*6/UL (ref 4.5–5.9)
SARS-COV-2 RNA RESP QL NAA+PROBE: NOT DETECTED
SODIUM SERPL-SCNC: 139 MMOL/L (ref 136–145)
WBC # BLD AUTO: 5.1 X10*3/UL (ref 4.4–11.3)

## 2023-11-14 PROCEDURE — 84484 ASSAY OF TROPONIN QUANT: CPT

## 2023-11-14 PROCEDURE — 83735 ASSAY OF MAGNESIUM: CPT

## 2023-11-14 PROCEDURE — 96374 THER/PROPH/DIAG INJ IV PUSH: CPT

## 2023-11-14 PROCEDURE — 80053 COMPREHEN METABOLIC PANEL: CPT

## 2023-11-14 PROCEDURE — 2500000004 HC RX 250 GENERAL PHARMACY W/ HCPCS (ALT 636 FOR OP/ED)

## 2023-11-14 PROCEDURE — 94760 N-INVAS EAR/PLS OXIMETRY 1: CPT

## 2023-11-14 PROCEDURE — 74177 CT ABD & PELVIS W/CONTRAST: CPT

## 2023-11-14 PROCEDURE — 2550000001 HC RX 255 CONTRASTS: Performed by: STUDENT IN AN ORGANIZED HEALTH CARE EDUCATION/TRAINING PROGRAM

## 2023-11-14 PROCEDURE — 71045 X-RAY EXAM CHEST 1 VIEW: CPT | Performed by: RADIOLOGY

## 2023-11-14 PROCEDURE — 74177 CT ABD & PELVIS W/CONTRAST: CPT | Performed by: RADIOLOGY

## 2023-11-14 PROCEDURE — 99285 EMERGENCY DEPT VISIT HI MDM: CPT | Mod: 25

## 2023-11-14 PROCEDURE — 99285 EMERGENCY DEPT VISIT HI MDM: CPT | Performed by: STUDENT IN AN ORGANIZED HEALTH CARE EDUCATION/TRAINING PROGRAM

## 2023-11-14 PROCEDURE — 85025 COMPLETE CBC W/AUTO DIFF WBC: CPT

## 2023-11-14 PROCEDURE — 36415 COLL VENOUS BLD VENIPUNCTURE: CPT

## 2023-11-14 PROCEDURE — 71045 X-RAY EXAM CHEST 1 VIEW: CPT | Mod: FY

## 2023-11-14 PROCEDURE — 87636 SARSCOV2 & INF A&B AMP PRB: CPT

## 2023-11-14 PROCEDURE — 83880 ASSAY OF NATRIURETIC PEPTIDE: CPT

## 2023-11-14 RX ORDER — ONDANSETRON 4 MG/1
4 TABLET, ORALLY DISINTEGRATING ORAL EVERY 8 HOURS PRN
Qty: 20 TABLET | Refills: 0 | Status: SHIPPED | OUTPATIENT
Start: 2023-11-14 | End: 2023-11-21

## 2023-11-14 RX ORDER — ONDANSETRON HYDROCHLORIDE 2 MG/ML
4 INJECTION, SOLUTION INTRAVENOUS ONCE
Status: COMPLETED | OUTPATIENT
Start: 2023-11-14 | End: 2023-11-14

## 2023-11-14 RX ADMIN — ONDANSETRON 4 MG: 2 INJECTION INTRAMUSCULAR; INTRAVENOUS at 12:14

## 2023-11-14 RX ADMIN — IOHEXOL 90 ML: 350 INJECTION, SOLUTION INTRAVENOUS at 12:35

## 2023-11-14 ASSESSMENT — COLUMBIA-SUICIDE SEVERITY RATING SCALE - C-SSRS
1. IN THE PAST MONTH, HAVE YOU WISHED YOU WERE DEAD OR WISHED YOU COULD GO TO SLEEP AND NOT WAKE UP?: NO
6. HAVE YOU EVER DONE ANYTHING, STARTED TO DO ANYTHING, OR PREPARED TO DO ANYTHING TO END YOUR LIFE?: NO
2. HAVE YOU ACTUALLY HAD ANY THOUGHTS OF KILLING YOURSELF?: NO

## 2023-11-14 ASSESSMENT — LIFESTYLE VARIABLES
REASON UNABLE TO ASSESS: NO
HAVE PEOPLE ANNOYED YOU BY CRITICIZING YOUR DRINKING: NO
EVER FELT BAD OR GUILTY ABOUT YOUR DRINKING: NO
HAVE YOU EVER FELT YOU SHOULD CUT DOWN ON YOUR DRINKING: NO
EVER HAD A DRINK FIRST THING IN THE MORNING TO STEADY YOUR NERVES TO GET RID OF A HANGOVER: NO

## 2023-11-14 ASSESSMENT — PAIN DESCRIPTION - LOCATION: LOCATION: ABDOMEN

## 2023-11-14 ASSESSMENT — PAIN SCALES - GENERAL
PAINLEVEL_OUTOF10: 5 - MODERATE PAIN
PAINLEVEL_OUTOF10: 2

## 2023-11-14 ASSESSMENT — PAIN - FUNCTIONAL ASSESSMENT: PAIN_FUNCTIONAL_ASSESSMENT: 0-10

## 2023-11-14 ASSESSMENT — PAIN DESCRIPTION - PAIN TYPE: TYPE: ACUTE PAIN

## 2023-11-14 NOTE — ED PROVIDER NOTES
EMERGENCY DEPARTMENT ENCOUNTER      Pt Name: Jovanny Yap  MRN: [id number]  Birthdate 1950  Date of evaluation: 11/14/2023    HISTORY OF PRESENT ILLNESS    Jovanny Yap is an 73 y.o. male with history including recurrent SBO, HTN, HLD, RCC, chronic fatigue syndrome, obesity, BPH, recent DVT presenting to the emergency department for generalized feeling unwell.  States that he has been feeling unwell for the last 2 days.  He had been recently hospitalized for a small bowel obstruction.  He has recurring small bowel obstructions.  Patient had a bowel movement yesterday evening.  He denies any abdominal pain but has some nausea but no vomiting.  Patient is concerned that he may have a another small bowel obstruction.  He denies any fevers, chills, chest pain, shortness of breath.      PAST MEDICAL HISTORY     Past Medical History:   Diagnosis Date    Body mass index (BMI) 36.0-36.9, adult 02/15/2021    Body mass index (BMI) of 36.0 to 36.9 in adult    Unspecified blepharoconjunctivitis, left eye 09/15/2021    Blepharoconjunctivitis of left eye       SURGICAL HISTORY       Past Surgical History:   Procedure Laterality Date    COLONOSCOPY  11/26/2012    Complete Colonoscopy    EYE SURGERY  10/08/2013    Eye Surgery    OTHER SURGICAL HISTORY  11/26/2012    Right Hemicolectomy       CURRENT MEDICATIONS       Previous Medications    CYANOCOBALAMIN (VITAMIN B-12) 1,000 MCG TABLET    Take 1 tablet (1,000 mcg) by mouth once daily.    ESCITALOPRAM (LEXAPRO) 5 MG TABLET    Take 1 tablet (5 mg) by mouth once daily.    FAMOTIDINE (PEPCID) 20 MG TABLET    Take 1 tablet (20 mg) by mouth once daily as needed.    PANTOPRAZOLE (PROTONIX) 40 MG EC TABLET    Take 1 tablet (40 mg) by mouth once daily.    RIVAROXABAN (XARELTO) 20 MG TABLET    Take 1 tablet (20 mg) by mouth once daily in the evening. Take with meals. Take with food.    SIMVASTATIN (ZOCOR) 20 MG TABLET    Take 1 tablet (20 mg) by mouth once daily at bedtime.     TAMSULOSIN (FLOMAX) 0.4 MG 24 HR CAPSULE    Take 1 capsule (0.4 mg) by mouth once daily.       ALLERGIES     Patient has no known allergies.    FAMILY HISTORY       Family History   Problem Relation Name Age of Onset    Stroke Mother      Coronary artery disease Father          SOCIAL HISTORY       Social History     Socioeconomic History    Marital status:      Spouse name: None    Number of children: None    Years of education: None    Highest education level: None   Occupational History     Employer: NONE   Tobacco Use    Smoking status: Never    Smokeless tobacco: Never   Vaping Use    Vaping Use: Never used   Substance and Sexual Activity    Alcohol use: Not Currently    Drug use: Never    Sexual activity: None   Other Topics Concern    None   Social History Narrative    None     Social Determinants of Health     Financial Resource Strain: Unknown (10/23/2023)    Overall Financial Resource Strain (CARDIA)     Difficulty of Paying Living Expenses: Patient refused   Food Insecurity: Not on file   Transportation Needs: Unknown (10/23/2023)    PRAPARE - Transportation     Lack of Transportation (Medical): Patient refused     Lack of Transportation (Non-Medical): Patient refused   Physical Activity: Not on file   Stress: Not on file   Social Connections: Not on file   Intimate Partner Violence: Not on file   Housing Stability: Unknown (10/23/2023)    Housing Stability Vital Sign     Unable to Pay for Housing in the Last Year: Patient refused     Number of Places Lived in the Last Year: 1     Unstable Housing in the Last Year: Patient refused       PHYSICAL EXAM       ED Triage Vitals [11/14/23 0934]   Temp Heart Rate Resp BP   36.1 °C (97 °F) 65 18 135/64      SpO2 Temp Source Heart Rate Source Patient Position   100 % Temporal -- Sitting      BP Location FiO2 (%)     Right arm --       Physical Exam  Vitals and nursing note reviewed.   Constitutional:       General: He is not in acute distress.      Appearance: He is well-developed. He is obese.   HENT:      Head: Normocephalic and atraumatic.   Eyes:      Conjunctiva/sclera: Conjunctivae normal.   Cardiovascular:      Rate and Rhythm: Normal rate and regular rhythm.      Heart sounds: No murmur heard.  Pulmonary:      Effort: Pulmonary effort is normal. No respiratory distress.      Breath sounds: Normal breath sounds.   Abdominal:      General: Abdomen is protuberant. A surgical scar is present. Bowel sounds are normal.      Palpations: Abdomen is soft.      Tenderness: There is no abdominal tenderness. There is no right CVA tenderness, left CVA tenderness, guarding or rebound.      Hernia: A hernia is present. Hernia is present in the ventral area.   Musculoskeletal:         General: No swelling.      Cervical back: Neck supple.   Skin:     General: Skin is warm and dry.      Capillary Refill: Capillary refill takes less than 2 seconds.   Neurological:      Mental Status: He is alert.   Psychiatric:         Mood and Affect: Mood normal.          DIAGNOSTIC RESULTS     LABS:  Labs Reviewed - No data to display    All other labs were within normal range or not returned as of this dictation.    Imaging  No orders to display        Procedures  Procedures     EMERGENCY DEPARTMENT COURSE/MDM:   Jovanny Yap is an 73 y.o. male with history including recurrent SBO, HTN, HLD, RCC, chronic fatigue syndrome, obesity, BPH, recent DVT presenting to the emergency department for generalized feeling unwell.  Patient's physical exam has no concerning findings.  His abdomen is soft does not feel full.  Patient is very concerned that he might have worsening SBO.  CT imaging ordered.  Patient was given Zofran for nausea.    Patient has a slightly elevated magnesium of 2.43.  No other electrolyte abnormalities.  No kidney injury or liver injury.  White count is normal.  BNP troponin are negative.  COVID and flu are negative.  Patient CT scan shows no acute findings.  Discussed  results with patient.  Iterated to him that he may have a viral illness causing him to feel unwell with generalized nausea and discomfort.  Patient will be discharged with a prescription for Zofran.    External records reviewed: recent inpatient, clinic, and prior ED notes  Labs and Diagnostic imaging independently reviewed/interpreted by me.    Diagnoses as of 11/15/23 0636   Generalized weakness   Nausea        Patient and or family in agreement and understanding of treatment plan.  All questions answered.      I reviewed the case with the attending ED physician. The attending ED physician agrees with the plan. Patient and/or patient´s representative was counseled regarding labs, imaging, likely diagnosis, and plan. All questions were answered.    ED Medications administered this visit:  Medications - No data to display    New Prescriptions from this visit:    New Prescriptions    No medications on file       (Please note that portions of this note were completed with a voice recognition program.  Efforts were made to edit the dictations but occasionally words are mis-transcribed.)     Sunitha Fofana DO  Resident  11/15/23 4354

## 2023-11-14 NOTE — ED NOTES
Patient to exam 19; cart in locked, low position; call light in reach; no needs voiced at this time     Kateryna Sheldon RN  11/14/23 5171

## 2023-11-14 NOTE — DISCHARGE INSTRUCTIONS
With your primary care provider in regards to this ED visit.  You can follow-up with your general surgeon in regards to the hernia and adhesion repair.

## 2023-11-15 ENCOUNTER — HOSPITAL ENCOUNTER (OUTPATIENT)
Dept: CARDIOLOGY | Facility: HOSPITAL | Age: 73
Discharge: HOME | End: 2023-11-15
Payer: MEDICARE

## 2023-11-15 LAB
ATRIAL RATE: 58 BPM
P AXIS: 40 DEGREES
P OFFSET: 172 MS
P ONSET: 141 MS
PR INTERVAL: 150 MS
Q ONSET: 216 MS
QRS COUNT: 10 BEATS
QRS DURATION: 92 MS
QT INTERVAL: 438 MS
QTC CALCULATION(BAZETT): 429 MS
QTC FREDERICIA: 432 MS
R AXIS: 6 DEGREES
T AXIS: 4 DEGREES
T OFFSET: 435 MS
VENTRICULAR RATE: 58 BPM

## 2023-11-15 PROCEDURE — 93005 ELECTROCARDIOGRAM TRACING: CPT

## 2023-11-16 DIAGNOSIS — E78.2 MIXED HYPERLIPIDEMIA: Primary | Chronic | ICD-10-CM

## 2023-11-16 RX ORDER — SIMVASTATIN 20 MG/1
20 TABLET, FILM COATED ORAL EVERY EVENING
Qty: 90 TABLET | Refills: 3 | Status: SHIPPED | OUTPATIENT
Start: 2023-11-16 | End: 2024-02-05 | Stop reason: SDUPTHER

## 2023-11-22 ENCOUNTER — TELEPHONE (OUTPATIENT)
Dept: CARDIOLOGY | Facility: CLINIC | Age: 73
End: 2023-11-22
Payer: MEDICARE

## 2023-11-22 DIAGNOSIS — I82.409 DEEP VEIN THROMBOSIS (DVT) OF LOWER EXTREMITY, UNSPECIFIED CHRONICITY, UNSPECIFIED LATERALITY, UNSPECIFIED VEIN (MULTI): Primary | ICD-10-CM

## 2023-11-22 NOTE — TELEPHONE ENCOUNTER
11/17/23   I spoke with Mr. Yap who tells me he had his blood work done and would like to know if he can stop the Xarelto?      11/22/23  I spoke with Dr. Beard who advises he may stop Xarelto and he'd like Mr. Yap to have repeat blood work in 2 weeks.      I called Mr. Yap, got his voicemail, left a detailed message and also sent him a note through My Chart

## 2023-12-04 ENCOUNTER — TELEPHONE (OUTPATIENT)
Dept: VASCULAR SURGERY | Facility: HOSPITAL | Age: 73
End: 2023-12-04
Payer: MEDICARE

## 2023-12-05 NOTE — PROGRESS NOTES
Subjective   Patient ID: Jovanny Yap is a 73 y.o. male who presents for virtual FUV    HPI   Very pleasant 73 year old male following up virtually for evaluation of DVT in left lower extremity. He presented to the ER with a swollen left leg which was warm to touch, and his ankle was swollen and stiff. Pt was started on Xarelto 15mg and has been on Xarelto 20mg now for 3 months.   Pt has had no prior DVTs, he has a hx of varicose veins. Pt has had a old blood clot in the left leg after a surgical procedure in the past.   No recent hx of trauma or injury.   Pt is UTD on his annual screenings. Denies any past hormonal treatment.  Patient was on a road trip- 4 hours to [place] and back in July.   Patient is a non-smoker. He is an occasional drinker.   Patient's leg swelling has improved, he is no longer on anticoagulation and both his D-Dimers were within normal limit.       Review of Systems  Constitutional: Negative.    HENT: Negative.     Eyes: Negative.    Respiratory: Negative.     Cardiovascular: Negative.    Endocrine: Negative.    Musculoskeletal: Negative.    Skin: Negative.    Neurological: Negative.    Hematological: Negative.    Psychiatric/Behavioral: Negative.     All other systems reviewed and are negative.    Objective   There were no vitals taken for this visit.      Assessment/Plan   There are no diagnoses linked to this encounter.  73 year old male following up with provoked left lower extremity DVT most likely from his 8-hours road trip. Pt is currently on Xarelto 20mg since 3 months. He denies any prior DVTs, but does have an occurrence of an old blood clot in the left leg after a procedure in the past. No recent hx of trauma or injury. Pt is UTD on his annual screenings. He is wearing compression stockings and leg swelling is minimal. His DASH score is 0 which is around 2% annual recurrence risk rate. D-Dimer is normal (295 on 11/8/23)    1- Patient can be taken off of anticoagulation as  his DASH score is 0 which is around 2% annual recurrence risk rate. His D-Dimers were also within normal limit.  2- Patient will stay on aspirin 81mg with food daily.   3- Pt encouraged to be active and be mobile.   4- Follow up in 3 months for re-assessment.     Scribe Attestation  By signing my name below, ISarah , Scribe   attest that this documentation has been prepared under the direction and in the presence of Albert Beard MD.

## 2023-12-06 ENCOUNTER — OFFICE VISIT (OUTPATIENT)
Dept: CARDIOLOGY | Facility: CLINIC | Age: 73
End: 2023-12-06
Payer: MEDICARE

## 2023-12-06 DIAGNOSIS — I82.412 ACUTE DEEP VEIN THROMBOSIS (DVT) OF FEMORAL VEIN OF LEFT LOWER EXTREMITY (MULTI): Primary | Chronic | ICD-10-CM

## 2023-12-06 PROCEDURE — 3008F BODY MASS INDEX DOCD: CPT | Performed by: INTERNAL MEDICINE

## 2023-12-06 PROCEDURE — 1036F TOBACCO NON-USER: CPT | Performed by: INTERNAL MEDICINE

## 2023-12-06 PROCEDURE — 1159F MED LIST DOCD IN RCRD: CPT | Performed by: INTERNAL MEDICINE

## 2023-12-06 PROCEDURE — 99213 OFFICE O/P EST LOW 20 MIN: CPT | Performed by: INTERNAL MEDICINE

## 2023-12-06 PROCEDURE — 1125F AMNT PAIN NOTED PAIN PRSNT: CPT | Performed by: INTERNAL MEDICINE

## 2023-12-06 PROCEDURE — 1160F RVW MEDS BY RX/DR IN RCRD: CPT | Performed by: INTERNAL MEDICINE

## 2023-12-07 ENCOUNTER — LAB (OUTPATIENT)
Dept: LAB | Facility: LAB | Age: 73
End: 2023-12-07
Payer: MEDICARE

## 2023-12-07 ENCOUNTER — OFFICE VISIT (OUTPATIENT)
Dept: SURGERY | Facility: CLINIC | Age: 73
End: 2023-12-07
Payer: MEDICARE

## 2023-12-07 VITALS
HEIGHT: 72 IN | OXYGEN SATURATION: 99 % | BODY MASS INDEX: 34.86 KG/M2 | HEART RATE: 58 BPM | TEMPERATURE: 97 F | SYSTOLIC BLOOD PRESSURE: 152 MMHG | RESPIRATION RATE: 16 BRPM | WEIGHT: 257.38 LBS | DIASTOLIC BLOOD PRESSURE: 91 MMHG

## 2023-12-07 DIAGNOSIS — I82.462 ACUTE DEEP VEIN THROMBOSIS (DVT) OF CALF MUSCLE VEIN OF LEFT LOWER EXTREMITY (MULTI): ICD-10-CM

## 2023-12-07 DIAGNOSIS — K43.2 INCISIONAL HERNIA, WITHOUT OBSTRUCTION OR GANGRENE: ICD-10-CM

## 2023-12-07 LAB — D DIMER PPP FEU-MCNC: 299 NG/ML FEU

## 2023-12-07 PROCEDURE — 3077F SYST BP >= 140 MM HG: CPT | Performed by: SURGERY

## 2023-12-07 PROCEDURE — 99213 OFFICE O/P EST LOW 20 MIN: CPT | Performed by: SURGERY

## 2023-12-07 PROCEDURE — 1125F AMNT PAIN NOTED PAIN PRSNT: CPT | Performed by: SURGERY

## 2023-12-07 PROCEDURE — 36415 COLL VENOUS BLD VENIPUNCTURE: CPT

## 2023-12-07 PROCEDURE — 85379 FIBRIN DEGRADATION QUANT: CPT

## 2023-12-07 PROCEDURE — 3008F BODY MASS INDEX DOCD: CPT | Performed by: SURGERY

## 2023-12-07 PROCEDURE — 1036F TOBACCO NON-USER: CPT | Performed by: SURGERY

## 2023-12-07 PROCEDURE — 3080F DIAST BP >= 90 MM HG: CPT | Performed by: SURGERY

## 2023-12-07 PROCEDURE — 1159F MED LIST DOCD IN RCRD: CPT | Performed by: SURGERY

## 2023-12-07 PROCEDURE — 1160F RVW MEDS BY RX/DR IN RCRD: CPT | Performed by: SURGERY

## 2023-12-07 NOTE — PROGRESS NOTES
Jovanny Yap   [id number]   1950       Chief Complaint/    Incisional hernia    HPI/      73 old male with a known midline incisional hernia.    Patient is completed anticoagulant therapy and is switching to aspirin.    Hernias not hurting        Past Medical History:   Diagnosis Date    Body mass index (BMI) 36.0-36.9, adult 02/15/2021    Body mass index (BMI) of 36.0 to 36.9 in adult    Unspecified blepharoconjunctivitis, left eye 09/15/2021    Blepharoconjunctivitis of left eye          Current Outpatient Medications:     cyanocobalamin (Vitamin B-12) 1,000 mcg tablet, Take 1 tablet (1,000 mcg) by mouth once daily., Disp: , Rfl:     escitalopram (Lexapro) 5 mg tablet, Take 1 tablet (5 mg) by mouth once daily., Disp: 90 tablet, Rfl: 1    famotidine (Pepcid) 20 mg tablet, Take 1 tablet (20 mg) by mouth once daily as needed., Disp: , Rfl:     pantoprazole (ProtoNix) 40 mg EC tablet, Take 1 tablet (40 mg) by mouth once daily., Disp: 90 tablet, Rfl: 2    simvastatin (Zocor) 20 mg tablet, TAKE 1 TABLET DAILY IN THE EVENING, Disp: 90 tablet, Rfl: 3    tamsulosin (Flomax) 0.4 mg 24 hr capsule, Take 1 capsule (0.4 mg) by mouth once daily., Disp: 90 capsule, Rfl: 2    rivaroxaban (Xarelto) 20 mg tablet, Take 1 tablet (20 mg) by mouth once daily in the evening. Take with meals. Take with food., Disp: , Rfl:      No Known Allergies     [unfilled]     ECG 12 lead  Sinus bradycardia  Otherwise normal ECG  When compared with ECG of 23-JUN-2023 14:06,  No significant change was found  BASELINE ARTIFACT  nonspecific T wave flattening  Confirmed by Alex York ([id number]) on 11/15/2023 2:52:03 PM         BP (!) 152/91 (BP Location: Right arm, Patient Position: Sitting, BP Cuff Size: Large adult)   Pulse 58   Temp 36.1 °C (97 °F) (Temporal)   Resp 16   Ht 1.829 m (6')   Wt 117 kg (257 lb 6 oz)   SpO2 99%   BMI 34.91 kg/m²      Physical Exam  Abdominal:      Comments: Midline scar noted.    Significant hernia from mid  epigastrium to mid suprapubic area, 1 large defect              Assessment/      Significant hernia of midline    Plan/    Refer to tertiary center

## 2023-12-28 ENCOUNTER — PATIENT OUTREACH (OUTPATIENT)
Dept: PRIMARY CARE | Facility: CLINIC | Age: 73
End: 2023-12-28
Payer: MEDICARE

## 2023-12-28 NOTE — PROGRESS NOTES
Call regarding appt. With Surgeon on 12/7/2023 after hospitalization.  At time of outreach call the patient feels as if their condition has improved since last visit. Kwadwo states things are going well now, no concerns at this time.

## 2024-01-09 ENCOUNTER — ANESTHESIA EVENT (OUTPATIENT)
Dept: OPERATING ROOM | Facility: CLINIC | Age: 74
End: 2024-01-09
Payer: MEDICARE

## 2024-01-10 ENCOUNTER — ANESTHESIA (OUTPATIENT)
Dept: OPERATING ROOM | Facility: CLINIC | Age: 74
End: 2024-01-10
Payer: MEDICARE

## 2024-01-10 ENCOUNTER — HOSPITAL ENCOUNTER (OUTPATIENT)
Dept: OPERATING ROOM | Facility: CLINIC | Age: 74
Discharge: HOME | End: 2024-01-10
Payer: MEDICARE

## 2024-01-10 VITALS
TEMPERATURE: 97.9 F | HEART RATE: 58 BPM | DIASTOLIC BLOOD PRESSURE: 68 MMHG | OXYGEN SATURATION: 96 % | RESPIRATION RATE: 16 BRPM | SYSTOLIC BLOOD PRESSURE: 120 MMHG

## 2024-01-10 DIAGNOSIS — Z12.11 COLON CANCER SCREENING: ICD-10-CM

## 2024-01-10 PROCEDURE — 94760 N-INVAS EAR/PLS OXIMETRY 1: CPT

## 2024-01-10 PROCEDURE — 88305 TISSUE EXAM BY PATHOLOGIST: CPT | Performed by: PATHOLOGY

## 2024-01-10 PROCEDURE — 99100 ANES PT EXTEME AGE<1 YR&>70: CPT | Performed by: ANESTHESIOLOGY

## 2024-01-10 PROCEDURE — A45380 PR COLONOSCOPY,BIOPSY: Performed by: NURSE ANESTHETIST, CERTIFIED REGISTERED

## 2024-01-10 PROCEDURE — 2500000004 HC RX 250 GENERAL PHARMACY W/ HCPCS (ALT 636 FOR OP/ED): Performed by: NURSE ANESTHETIST, CERTIFIED REGISTERED

## 2024-01-10 PROCEDURE — 7100000010 HC PHASE TWO TIME - EACH INCREMENTAL 1 MINUTE: Performed by: ANESTHESIOLOGY

## 2024-01-10 PROCEDURE — 3700000002 HC GENERAL ANESTHESIA TIME - EACH INCREMENTAL 1 MINUTE: Performed by: ANESTHESIOLOGY

## 2024-01-10 PROCEDURE — 3600000002 HC OR TIME - INITIAL BASE CHARGE - PROCEDURE LEVEL TWO: Performed by: ANESTHESIOLOGY

## 2024-01-10 PROCEDURE — 45380 COLONOSCOPY AND BIOPSY: CPT | Performed by: INTERNAL MEDICINE

## 2024-01-10 PROCEDURE — 3700000001 HC GENERAL ANESTHESIA TIME - INITIAL BASE CHARGE: Performed by: ANESTHESIOLOGY

## 2024-01-10 PROCEDURE — 7100000009 HC PHASE TWO TIME - INITIAL BASE CHARGE: Performed by: ANESTHESIOLOGY

## 2024-01-10 PROCEDURE — A45380 PR COLONOSCOPY,BIOPSY: Performed by: ANESTHESIOLOGY

## 2024-01-10 PROCEDURE — 3600000007 HC OR TIME - EACH INCREMENTAL 1 MINUTE - PROCEDURE LEVEL TWO: Performed by: ANESTHESIOLOGY

## 2024-01-10 PROCEDURE — 0753T DGTZ GLS MCRSCP SLD LEVEL IV: CPT | Mod: TC,SUR,ELYLAB | Performed by: INTERNAL MEDICINE

## 2024-01-10 RX ORDER — PROPOFOL 10 MG/ML
INJECTION, EMULSION INTRAVENOUS CONTINUOUS PRN
Status: DISCONTINUED | OUTPATIENT
Start: 2024-01-10 | End: 2024-01-10

## 2024-01-10 RX ORDER — SODIUM CHLORIDE, SODIUM LACTATE, POTASSIUM CHLORIDE, CALCIUM CHLORIDE 600; 310; 30; 20 MG/100ML; MG/100ML; MG/100ML; MG/100ML
INJECTION, SOLUTION INTRAVENOUS CONTINUOUS PRN
Status: DISCONTINUED | OUTPATIENT
Start: 2024-01-10 | End: 2024-01-10

## 2024-01-10 RX ORDER — LIDOCAINE IN NACL,ISO-OSMOT/PF 30 MG/3 ML
0.1 SYRINGE (ML) INJECTION ONCE
Status: DISCONTINUED | OUTPATIENT
Start: 2024-01-10 | End: 2024-01-11 | Stop reason: HOSPADM

## 2024-01-10 RX ORDER — PROPOFOL 10 MG/ML
INJECTION, EMULSION INTRAVENOUS AS NEEDED
Status: DISCONTINUED | OUTPATIENT
Start: 2024-01-10 | End: 2024-01-10

## 2024-01-10 RX ORDER — ONDANSETRON HYDROCHLORIDE 2 MG/ML
4 INJECTION, SOLUTION INTRAVENOUS ONCE AS NEEDED
Status: DISCONTINUED | OUTPATIENT
Start: 2024-01-10 | End: 2024-01-11 | Stop reason: HOSPADM

## 2024-01-10 RX ORDER — ONDANSETRON 4 MG/1
4 TABLET, ORALLY DISINTEGRATING ORAL EVERY 8 HOURS PRN
COMMUNITY
End: 2024-01-19 | Stop reason: ENTERED-IN-ERROR

## 2024-01-10 RX ORDER — SODIUM CHLORIDE, SODIUM LACTATE, POTASSIUM CHLORIDE, CALCIUM CHLORIDE 600; 310; 30; 20 MG/100ML; MG/100ML; MG/100ML; MG/100ML
100 INJECTION, SOLUTION INTRAVENOUS CONTINUOUS
Status: DISCONTINUED | OUTPATIENT
Start: 2024-01-10 | End: 2024-01-11 | Stop reason: HOSPADM

## 2024-01-10 RX ADMIN — SODIUM CHLORIDE, SODIUM LACTATE, POTASSIUM CHLORIDE, AND CALCIUM CHLORIDE: .6; .31; .03; .02 INJECTION, SOLUTION INTRAVENOUS at 10:13

## 2024-01-10 RX ADMIN — PROPOFOL 200 MCG/KG/MIN: 10 INJECTION, EMULSION INTRAVENOUS at 10:32

## 2024-01-10 RX ADMIN — PROPOFOL 30 MG: 10 INJECTION, EMULSION INTRAVENOUS at 10:32

## 2024-01-10 SDOH — HEALTH STABILITY: MENTAL HEALTH: CURRENT SMOKER: 0

## 2024-01-10 ASSESSMENT — ENCOUNTER SYMPTOMS
UNEXPECTED WEIGHT CHANGE: 0
ANAL BLEEDING: 0
DIARRHEA: 0
BLOOD IN STOOL: 0
ABDOMINAL PAIN: 0
FEVER: 0
TROUBLE SWALLOWING: 0
VOMITING: 0
SHORTNESS OF BREATH: 0
CONSTIPATION: 0
ABDOMINAL DISTENTION: 0
RECTAL PAIN: 0
CHILLS: 0
COLOR CHANGE: 0
NAUSEA: 0

## 2024-01-10 ASSESSMENT — PAIN SCALES - GENERAL
PAINLEVEL_OUTOF10: 0 - NO PAIN

## 2024-01-10 ASSESSMENT — COLUMBIA-SUICIDE SEVERITY RATING SCALE - C-SSRS
1. IN THE PAST MONTH, HAVE YOU WISHED YOU WERE DEAD OR WISHED YOU COULD GO TO SLEEP AND NOT WAKE UP?: NO
2. HAVE YOU ACTUALLY HAD ANY THOUGHTS OF KILLING YOURSELF?: NO
6. HAVE YOU EVER DONE ANYTHING, STARTED TO DO ANYTHING, OR PREPARED TO DO ANYTHING TO END YOUR LIFE?: NO

## 2024-01-10 ASSESSMENT — PAIN - FUNCTIONAL ASSESSMENT
PAIN_FUNCTIONAL_ASSESSMENT: 0-10

## 2024-01-10 NOTE — ANESTHESIA PREPROCEDURE EVALUATION
Patient: Jovanny Yap    Procedure Information       Date/Time: 01/10/24 1000    Scheduled providers: Mati Fernandez MD; Cristian Sanders MD    Procedure: COLONOSCOPY    Location: [facility] OR            Relevant Problems   Cardiovascular   (+) Benign essential hypertension   (+) Chest pain   (+) Hyperlipidemia      GI   (+) GERD (gastroesophageal reflux disease)      /Renal   (+) Acute kidney injury (CMS/HCC)   (+) Fatty liver   (+) Kidney lesion   (+) Renal cyst   (+) Renal oncocytoma      Neuro/Psych   (+) Anxiety disorder      GI/Hepatic   (+) Fatty liver      Musculoskeletal   (+) Chronic low back pain      Eyes, Ears, Nose, and Throat   (+) Bilateral sensorineural hearing loss       Clinical information reviewed:   Tobacco  Allergies  Meds   Med Hx  Surg Hx   Fam Hx  Soc Hx        NPO Detail:  No data recorded     Anesthesia Plan    History of general anesthesia?: yes  History of complications of general anesthesia?: no    ASA 2     MAC     The patient is not a current smoker.  Patient did not smoke on day of procedure.    intravenous induction   Anesthetic plan and risks discussed with patient.    Plan discussed with CRNA.

## 2024-01-10 NOTE — H&P
History Of Present Illness  Jovanny Yap is a 73 y.o. male presenting with surveillance colonoscopy.     Past Medical History  Past Medical History:   Diagnosis Date    Body mass index (BMI) 36.0-36.9, adult 02/15/2021    Body mass index (BMI) of 36.0 to 36.9 in adult    Unspecified blepharoconjunctivitis, left eye 09/15/2021    Blepharoconjunctivitis of left eye       Surgical History  Past Surgical History:   Procedure Laterality Date    COLONOSCOPY  11/26/2012    Complete Colonoscopy    EYE SURGERY  10/08/2013    Eye Surgery    OTHER SURGICAL HISTORY  11/26/2012    Right Hemicolectomy        Social History  He reports that he has never smoked. He has never used smokeless tobacco. He reports that he does not currently use alcohol. He reports that he does not use drugs.    Family History  Family History   Problem Relation Name Age of Onset    Stroke Mother      Coronary artery disease Father          Allergies  Patient has no known allergies.    Review of Systems   Constitutional:  Negative for chills, fever and unexpected weight change.   HENT:  Negative for trouble swallowing.    Respiratory:  Negative for shortness of breath.    Cardiovascular:  Negative for chest pain.   Gastrointestinal:  Negative for abdominal distention, abdominal pain, anal bleeding, blood in stool, constipation, diarrhea, nausea, rectal pain and vomiting.   Skin:  Negative for color change.        Physical Exam  Vitals reviewed.   Constitutional:       General: He is awake.   Cardiovascular:      Rate and Rhythm: Normal rate and regular rhythm.   Pulmonary:      Effort: Pulmonary effort is normal.      Breath sounds: Normal breath sounds.   Neurological:      Mental Status: He is alert and oriented to person, place, and time.   Psychiatric:         Attention and Perception: Attention and perception normal.         Behavior: Behavior normal.          Last Recorded Vitals  There were no vitals taken for this visit.    Relevant  Results             Assessment/Plan   Proceed with surveillance colonoscopy    Mati Fernandez MD

## 2024-01-10 NOTE — DISCHARGE INSTRUCTIONS
During the first 24 hours after your procedure, you should:    - Resume normal diet, unless otherwise directed by your doctor.  - Resume your home medications, unless otherwise directed by your doctor.  - Refrain from driving or operative heavy machinery.  - Drink plenty of liquids.  - Avoid consuming alcohol.  - Avoid strenuous activity or heavy lifting.    After 24 hours, you can resume regular activity.    Call your doctor office immediately ([phone]) or come to the nearest emergency room if you experience:    - Abdominal tenderness  - Blood in your stool or vomit  - Difficulty urinating or passing stools  - Difficulty breathing  - Chest pain  - Fever       If you experience any problems or have any questions following discharge from the GI Lab, please call:   Dr. Fernandez [phone].   To reach your physician after hours call [phone] and ask for the GI physician on call.

## 2024-01-10 NOTE — ANESTHESIA POSTPROCEDURE EVALUATION
Patient: Jovanny Yap    Procedure Summary       Date: 01/10/24 Room / Location: [facility] OR    Anesthesia Start: 1028 Anesthesia Stop: 1100    Procedure: COLONOSCOPY Diagnosis: Colon cancer screening    Scheduled Providers: Mati Fernandez MD; Cristian Sanders MD Responsible Provider: Cristian Sanders MD    Anesthesia Type: MAC ASA Status: 2            Anesthesia Type: MAC    Vitals Value Taken Time   /68 01/10/24 1121   Temp 36.6 °C (97.9 °F) 01/10/24 1121   Pulse 58 01/10/24 1121   Resp 16 01/10/24 1121   SpO2 96 % 01/10/24 1121       Anesthesia Post Evaluation    Patient location during evaluation: bedside  Patient participation: complete - patient participated  Level of consciousness: awake and alert  Pain management: adequate  Airway patency: patent  Cardiovascular status: acceptable  Respiratory status: acceptable  Hydration status: acceptable  Postoperative Nausea and Vomiting: none    There were no known notable events for this encounter.

## 2024-01-19 ENCOUNTER — HOSPITAL ENCOUNTER (INPATIENT)
Facility: HOSPITAL | Age: 74
LOS: 3 days | Discharge: HOME | DRG: 389 | End: 2024-01-22
Attending: STUDENT IN AN ORGANIZED HEALTH CARE EDUCATION/TRAINING PROGRAM | Admitting: INTERNAL MEDICINE
Payer: MEDICARE

## 2024-01-19 ENCOUNTER — APPOINTMENT (OUTPATIENT)
Dept: RADIOLOGY | Facility: HOSPITAL | Age: 74
DRG: 389 | End: 2024-01-19
Payer: MEDICARE

## 2024-01-19 ENCOUNTER — APPOINTMENT (OUTPATIENT)
Dept: CARDIOLOGY | Facility: HOSPITAL | Age: 74
DRG: 389 | End: 2024-01-19
Payer: MEDICARE

## 2024-01-19 DIAGNOSIS — K56.609 SMALL BOWEL OBSTRUCTION (MULTI): ICD-10-CM

## 2024-01-19 DIAGNOSIS — R10.9 ABDOMINAL PAIN, UNSPECIFIED ABDOMINAL LOCATION: Primary | ICD-10-CM

## 2024-01-19 PROBLEM — R10.84 GENERALIZED ABDOMINAL PAIN: Status: ACTIVE | Noted: 2024-01-19

## 2024-01-19 PROBLEM — E87.20 LACTIC ACIDOSIS: Status: ACTIVE | Noted: 2024-01-19

## 2024-01-19 LAB
ALBUMIN SERPL BCP-MCNC: 4.5 G/DL (ref 3.4–5)
ALP SERPL-CCNC: 71 U/L (ref 33–136)
ALT SERPL W P-5'-P-CCNC: 27 U/L (ref 10–52)
ANION GAP SERPL CALC-SCNC: 14 MMOL/L (ref 10–20)
APPEARANCE UR: CLEAR
AST SERPL W P-5'-P-CCNC: 20 U/L (ref 9–39)
BASOPHILS # BLD AUTO: 0.03 X10*3/UL (ref 0–0.1)
BASOPHILS NFR BLD AUTO: 0.3 %
BILIRUB SERPL-MCNC: 0.6 MG/DL (ref 0–1.2)
BILIRUB UR STRIP.AUTO-MCNC: NEGATIVE MG/DL
BUN SERPL-MCNC: 17 MG/DL (ref 6–23)
CALCIUM SERPL-MCNC: 9.3 MG/DL (ref 8.6–10.3)
CARDIAC TROPONIN I PNL SERPL HS: 9 NG/L (ref 0–20)
CHLORIDE SERPL-SCNC: 101 MMOL/L (ref 98–107)
CO2 SERPL-SCNC: 26 MMOL/L (ref 21–32)
COLOR UR: YELLOW
CREAT SERPL-MCNC: 1.15 MG/DL (ref 0.5–1.3)
EGFRCR SERPLBLD CKD-EPI 2021: 67 ML/MIN/1.73M*2
EOSINOPHIL # BLD AUTO: 0.07 X10*3/UL (ref 0–0.4)
EOSINOPHIL NFR BLD AUTO: 0.8 %
ERYTHROCYTE [DISTWIDTH] IN BLOOD BY AUTOMATED COUNT: 12.7 % (ref 11.5–14.5)
FLUAV RNA RESP QL NAA+PROBE: NOT DETECTED
FLUBV RNA RESP QL NAA+PROBE: NOT DETECTED
GLUCOSE SERPL-MCNC: 122 MG/DL (ref 74–99)
GLUCOSE UR STRIP.AUTO-MCNC: NEGATIVE MG/DL
HCT VFR BLD AUTO: 48.4 % (ref 41–52)
HGB BLD-MCNC: 16.6 G/DL (ref 13.5–17.5)
IMM GRANULOCYTES # BLD AUTO: 0.02 X10*3/UL (ref 0–0.5)
IMM GRANULOCYTES NFR BLD AUTO: 0.2 % (ref 0–0.9)
INR PPP: 1 (ref 0.9–1.1)
KETONES UR STRIP.AUTO-MCNC: NEGATIVE MG/DL
LABORATORY COMMENT REPORT: NORMAL
LACTATE SERPL-SCNC: 1.6 MMOL/L (ref 0.4–2)
LACTATE SERPL-SCNC: 2.1 MMOL/L (ref 0.4–2)
LACTATE SERPL-SCNC: 2.4 MMOL/L (ref 0.4–2)
LEUKOCYTE ESTERASE UR QL STRIP.AUTO: NEGATIVE
LIPASE SERPL-CCNC: 33 U/L (ref 9–82)
LYMPHOCYTES # BLD AUTO: 1.14 X10*3/UL (ref 0.8–3)
LYMPHOCYTES NFR BLD AUTO: 12.2 %
MAGNESIUM SERPL-MCNC: 2.29 MG/DL (ref 1.6–2.4)
MCH RBC QN AUTO: 32 PG (ref 26–34)
MCHC RBC AUTO-ENTMCNC: 34.3 G/DL (ref 32–36)
MCV RBC AUTO: 93 FL (ref 80–100)
MONOCYTES # BLD AUTO: 0.62 X10*3/UL (ref 0.05–0.8)
MONOCYTES NFR BLD AUTO: 6.7 %
NEUTROPHILS # BLD AUTO: 7.43 X10*3/UL (ref 1.6–5.5)
NEUTROPHILS NFR BLD AUTO: 79.8 %
NITRITE UR QL STRIP.AUTO: NEGATIVE
NRBC BLD-RTO: 0 /100 WBCS (ref 0–0)
PATH REPORT.FINAL DX SPEC: NORMAL
PATH REPORT.GROSS SPEC: NORMAL
PATH REPORT.TOTAL CANCER: NORMAL
PH UR STRIP.AUTO: 6 [PH]
PLATELET # BLD AUTO: 151 X10*3/UL (ref 150–450)
POTASSIUM SERPL-SCNC: 4.1 MMOL/L (ref 3.5–5.3)
PROT SERPL-MCNC: 7.2 G/DL (ref 6.4–8.2)
PROT UR STRIP.AUTO-MCNC: NEGATIVE MG/DL
PROTHROMBIN TIME: 11.7 SECONDS (ref 9.8–12.8)
RBC # BLD AUTO: 5.18 X10*6/UL (ref 4.5–5.9)
RBC # UR STRIP.AUTO: NEGATIVE /UL
SARS-COV-2 RNA RESP QL NAA+PROBE: NOT DETECTED
SODIUM SERPL-SCNC: 137 MMOL/L (ref 136–145)
SP GR UR STRIP.AUTO: 1.02
UROBILINOGEN UR STRIP.AUTO-MCNC: <2 MG/DL
WBC # BLD AUTO: 9.3 X10*3/UL (ref 4.4–11.3)

## 2024-01-19 PROCEDURE — 74018 RADEX ABDOMEN 1 VIEW: CPT

## 2024-01-19 PROCEDURE — 99285 EMERGENCY DEPT VISIT HI MDM: CPT | Performed by: STUDENT IN AN ORGANIZED HEALTH CARE EDUCATION/TRAINING PROGRAM

## 2024-01-19 PROCEDURE — 85025 COMPLETE CBC W/AUTO DIFF WBC: CPT | Performed by: STUDENT IN AN ORGANIZED HEALTH CARE EDUCATION/TRAINING PROGRAM

## 2024-01-19 PROCEDURE — 2500000004 HC RX 250 GENERAL PHARMACY W/ HCPCS (ALT 636 FOR OP/ED): Performed by: STUDENT IN AN ORGANIZED HEALTH CARE EDUCATION/TRAINING PROGRAM

## 2024-01-19 PROCEDURE — 81003 URINALYSIS AUTO W/O SCOPE: CPT | Performed by: STUDENT IN AN ORGANIZED HEALTH CARE EDUCATION/TRAINING PROGRAM

## 2024-01-19 PROCEDURE — 36415 COLL VENOUS BLD VENIPUNCTURE: CPT | Performed by: STUDENT IN AN ORGANIZED HEALTH CARE EDUCATION/TRAINING PROGRAM

## 2024-01-19 PROCEDURE — 84484 ASSAY OF TROPONIN QUANT: CPT | Performed by: STUDENT IN AN ORGANIZED HEALTH CARE EDUCATION/TRAINING PROGRAM

## 2024-01-19 PROCEDURE — 74177 CT ABD & PELVIS W/CONTRAST: CPT | Mod: FOREIGN READ | Performed by: RADIOLOGY

## 2024-01-19 PROCEDURE — 2550000001 HC RX 255 CONTRASTS: Performed by: STUDENT IN AN ORGANIZED HEALTH CARE EDUCATION/TRAINING PROGRAM

## 2024-01-19 PROCEDURE — 96376 TX/PRO/DX INJ SAME DRUG ADON: CPT

## 2024-01-19 PROCEDURE — 99223 1ST HOSP IP/OBS HIGH 75: CPT | Performed by: INTERNAL MEDICINE

## 2024-01-19 PROCEDURE — 96375 TX/PRO/DX INJ NEW DRUG ADDON: CPT

## 2024-01-19 PROCEDURE — 74177 CT ABD & PELVIS W/CONTRAST: CPT

## 2024-01-19 PROCEDURE — 83690 ASSAY OF LIPASE: CPT | Performed by: STUDENT IN AN ORGANIZED HEALTH CARE EDUCATION/TRAINING PROGRAM

## 2024-01-19 PROCEDURE — 85610 PROTHROMBIN TIME: CPT | Performed by: STUDENT IN AN ORGANIZED HEALTH CARE EDUCATION/TRAINING PROGRAM

## 2024-01-19 PROCEDURE — 93005 ELECTROCARDIOGRAM TRACING: CPT

## 2024-01-19 PROCEDURE — 96374 THER/PROPH/DIAG INJ IV PUSH: CPT

## 2024-01-19 PROCEDURE — 0D9670Z DRAINAGE OF STOMACH WITH DRAINAGE DEVICE, VIA NATURAL OR ARTIFICIAL OPENING: ICD-10-PCS | Performed by: STUDENT IN AN ORGANIZED HEALTH CARE EDUCATION/TRAINING PROGRAM

## 2024-01-19 PROCEDURE — 87636 SARSCOV2 & INF A&B AMP PRB: CPT | Performed by: STUDENT IN AN ORGANIZED HEALTH CARE EDUCATION/TRAINING PROGRAM

## 2024-01-19 PROCEDURE — 1200000002 HC GENERAL ROOM WITH TELEMETRY DAILY

## 2024-01-19 PROCEDURE — 2500000004 HC RX 250 GENERAL PHARMACY W/ HCPCS (ALT 636 FOR OP/ED): Performed by: INTERNAL MEDICINE

## 2024-01-19 PROCEDURE — 80053 COMPREHEN METABOLIC PANEL: CPT | Performed by: STUDENT IN AN ORGANIZED HEALTH CARE EDUCATION/TRAINING PROGRAM

## 2024-01-19 PROCEDURE — 93010 ELECTROCARDIOGRAM REPORT: CPT | Performed by: INTERNAL MEDICINE

## 2024-01-19 PROCEDURE — 83605 ASSAY OF LACTIC ACID: CPT | Performed by: STUDENT IN AN ORGANIZED HEALTH CARE EDUCATION/TRAINING PROGRAM

## 2024-01-19 PROCEDURE — 74018 RADEX ABDOMEN 1 VIEW: CPT | Mod: FOREIGN READ | Performed by: RADIOLOGY

## 2024-01-19 PROCEDURE — 83735 ASSAY OF MAGNESIUM: CPT | Performed by: STUDENT IN AN ORGANIZED HEALTH CARE EDUCATION/TRAINING PROGRAM

## 2024-01-19 RX ORDER — ENOXAPARIN SODIUM 100 MG/ML
40 INJECTION SUBCUTANEOUS EVERY 24 HOURS
Status: DISCONTINUED | OUTPATIENT
Start: 2024-01-19 | End: 2024-01-22 | Stop reason: HOSPADM

## 2024-01-19 RX ORDER — PANTOPRAZOLE SODIUM 40 MG/1
40 TABLET, DELAYED RELEASE ORAL
Status: DISCONTINUED | OUTPATIENT
Start: 2024-01-20 | End: 2024-01-22 | Stop reason: HOSPADM

## 2024-01-19 RX ORDER — ONDANSETRON HYDROCHLORIDE 2 MG/ML
4 INJECTION, SOLUTION INTRAVENOUS ONCE
Status: COMPLETED | OUTPATIENT
Start: 2024-01-19 | End: 2024-01-19

## 2024-01-19 RX ORDER — ONDANSETRON HYDROCHLORIDE 2 MG/ML
4 INJECTION, SOLUTION INTRAVENOUS EVERY 8 HOURS PRN
Status: DISCONTINUED | OUTPATIENT
Start: 2024-01-19 | End: 2024-01-22 | Stop reason: HOSPADM

## 2024-01-19 RX ORDER — TALC
3 POWDER (GRAM) TOPICAL NIGHTLY PRN
Status: DISCONTINUED | OUTPATIENT
Start: 2024-01-19 | End: 2024-01-22 | Stop reason: HOSPADM

## 2024-01-19 RX ORDER — ACETAMINOPHEN 160 MG/5ML
650 SOLUTION ORAL EVERY 4 HOURS PRN
Status: DISCONTINUED | OUTPATIENT
Start: 2024-01-19 | End: 2024-01-22 | Stop reason: HOSPADM

## 2024-01-19 RX ORDER — ACETAMINOPHEN 650 MG/1
650 SUPPOSITORY RECTAL EVERY 4 HOURS PRN
Status: DISCONTINUED | OUTPATIENT
Start: 2024-01-19 | End: 2024-01-22 | Stop reason: HOSPADM

## 2024-01-19 RX ORDER — METOCLOPRAMIDE HYDROCHLORIDE 5 MG/ML
10 INJECTION INTRAMUSCULAR; INTRAVENOUS EVERY 6 HOURS PRN
Status: DISCONTINUED | OUTPATIENT
Start: 2024-01-19 | End: 2024-01-22 | Stop reason: HOSPADM

## 2024-01-19 RX ORDER — ACETAMINOPHEN 325 MG/1
650 TABLET ORAL EVERY 4 HOURS PRN
Status: DISCONTINUED | OUTPATIENT
Start: 2024-01-19 | End: 2024-01-22 | Stop reason: HOSPADM

## 2024-01-19 RX ORDER — ASPIRIN 81 MG/1
81 TABLET ORAL DAILY
COMMUNITY

## 2024-01-19 RX ORDER — ONDANSETRON 4 MG/1
4 TABLET, ORALLY DISINTEGRATING ORAL EVERY 8 HOURS PRN
Status: DISCONTINUED | OUTPATIENT
Start: 2024-01-19 | End: 2024-01-22 | Stop reason: HOSPADM

## 2024-01-19 RX ORDER — PANTOPRAZOLE SODIUM 40 MG/10ML
40 INJECTION, POWDER, LYOPHILIZED, FOR SOLUTION INTRAVENOUS
Status: DISCONTINUED | OUTPATIENT
Start: 2024-01-20 | End: 2024-01-22 | Stop reason: HOSPADM

## 2024-01-19 RX ORDER — POLYETHYLENE GLYCOL 3350 17 G/17G
17 POWDER, FOR SOLUTION ORAL DAILY PRN
Status: DISCONTINUED | OUTPATIENT
Start: 2024-01-19 | End: 2024-01-22 | Stop reason: HOSPADM

## 2024-01-19 RX ORDER — HYDROMORPHONE HYDROCHLORIDE 1 MG/ML
0.6 INJECTION, SOLUTION INTRAMUSCULAR; INTRAVENOUS; SUBCUTANEOUS EVERY 4 HOURS PRN
Status: DISCONTINUED | OUTPATIENT
Start: 2024-01-19 | End: 2024-01-22 | Stop reason: HOSPADM

## 2024-01-19 RX ORDER — METOCLOPRAMIDE 10 MG/1
10 TABLET ORAL EVERY 6 HOURS PRN
Status: DISCONTINUED | OUTPATIENT
Start: 2024-01-19 | End: 2024-01-22 | Stop reason: HOSPADM

## 2024-01-19 RX ORDER — SODIUM CHLORIDE 9 MG/ML
75 INJECTION, SOLUTION INTRAVENOUS CONTINUOUS
Status: ACTIVE | OUTPATIENT
Start: 2024-01-19 | End: 2024-01-20

## 2024-01-19 RX ADMIN — SODIUM CHLORIDE 500 ML: 9 INJECTION, SOLUTION INTRAVENOUS at 17:37

## 2024-01-19 RX ADMIN — HYDROMORPHONE HYDROCHLORIDE 0.5 MG: 1 INJECTION, SOLUTION INTRAMUSCULAR; INTRAVENOUS; SUBCUTANEOUS at 19:01

## 2024-01-19 RX ADMIN — ONDANSETRON 4 MG: 2 INJECTION INTRAMUSCULAR; INTRAVENOUS at 17:56

## 2024-01-19 RX ADMIN — ONDANSETRON 4 MG: 2 INJECTION INTRAMUSCULAR; INTRAVENOUS at 21:50

## 2024-01-19 RX ADMIN — HYDROMORPHONE HYDROCHLORIDE 0.5 MG: 1 INJECTION, SOLUTION INTRAMUSCULAR; INTRAVENOUS; SUBCUTANEOUS at 20:34

## 2024-01-19 RX ADMIN — IOHEXOL 75 ML: 350 INJECTION, SOLUTION INTRAVENOUS at 19:23

## 2024-01-19 RX ADMIN — SODIUM CHLORIDE 75 ML/HR: 9 INJECTION, SOLUTION INTRAVENOUS at 23:26

## 2024-01-19 RX ADMIN — ONDANSETRON 4 MG: 2 INJECTION INTRAMUSCULAR; INTRAVENOUS at 20:34

## 2024-01-19 RX ADMIN — METOCLOPRAMIDE 10 MG: 5 INJECTION, SOLUTION INTRAMUSCULAR; INTRAVENOUS at 23:23

## 2024-01-19 RX ADMIN — ENOXAPARIN SODIUM 40 MG: 40 INJECTION SUBCUTANEOUS at 23:23

## 2024-01-19 RX ADMIN — HYDROMORPHONE HYDROCHLORIDE 0.5 MG: 1 INJECTION, SOLUTION INTRAMUSCULAR; INTRAVENOUS; SUBCUTANEOUS at 21:50

## 2024-01-19 RX ADMIN — SODIUM CHLORIDE 500 ML: 9 INJECTION, SOLUTION INTRAVENOUS at 21:50

## 2024-01-19 SDOH — SOCIAL STABILITY: SOCIAL INSECURITY: DO YOU FEEL UNSAFE GOING BACK TO THE PLACE WHERE YOU ARE LIVING?: NO

## 2024-01-19 SDOH — SOCIAL STABILITY: SOCIAL INSECURITY: ABUSE: ADULT

## 2024-01-19 SDOH — SOCIAL STABILITY: SOCIAL INSECURITY: ARE YOU OR HAVE YOU BEEN THREATENED OR ABUSED PHYSICALLY, EMOTIONALLY, OR SEXUALLY BY ANYONE?: NO

## 2024-01-19 SDOH — SOCIAL STABILITY: SOCIAL INSECURITY: DOES ANYONE TRY TO KEEP YOU FROM HAVING/CONTACTING OTHER FRIENDS OR DOING THINGS OUTSIDE YOUR HOME?: NO

## 2024-01-19 SDOH — SOCIAL STABILITY: SOCIAL INSECURITY: WERE YOU ABLE TO COMPLETE ALL THE BEHAVIORAL HEALTH SCREENINGS?: YES

## 2024-01-19 SDOH — SOCIAL STABILITY: SOCIAL INSECURITY: ARE THERE ANY APPARENT SIGNS OF INJURIES/BEHAVIORS THAT COULD BE RELATED TO ABUSE/NEGLECT?: NO

## 2024-01-19 SDOH — SOCIAL STABILITY: SOCIAL INSECURITY: HAVE YOU HAD THOUGHTS OF HARMING ANYONE ELSE?: NO

## 2024-01-19 SDOH — SOCIAL STABILITY: SOCIAL INSECURITY: DO YOU FEEL ANYONE HAS EXPLOITED OR TAKEN ADVANTAGE OF YOU FINANCIALLY OR OF YOUR PERSONAL PROPERTY?: NO

## 2024-01-19 SDOH — SOCIAL STABILITY: SOCIAL INSECURITY: HAS ANYONE EVER THREATENED TO HURT YOUR FAMILY OR YOUR PETS?: NO

## 2024-01-19 ASSESSMENT — ACTIVITIES OF DAILY LIVING (ADL)
HEARING - LEFT EAR: FUNCTIONAL
BATHING: INDEPENDENT
ADEQUATE_TO_COMPLETE_ADL: YES
PATIENT'S MEMORY ADEQUATE TO SAFELY COMPLETE DAILY ACTIVITIES?: YES
DRESSING YOURSELF: INDEPENDENT
HEARING - RIGHT EAR: FUNCTIONAL
JUDGMENT_ADEQUATE_SAFELY_COMPLETE_DAILY_ACTIVITIES: YES
LACK_OF_TRANSPORTATION: NO
TOILETING: INDEPENDENT
GROOMING: INDEPENDENT
WALKS IN HOME: INDEPENDENT
FEEDING YOURSELF: INDEPENDENT

## 2024-01-19 ASSESSMENT — PAIN - FUNCTIONAL ASSESSMENT: PAIN_FUNCTIONAL_ASSESSMENT: 0-10

## 2024-01-19 ASSESSMENT — COGNITIVE AND FUNCTIONAL STATUS - GENERAL
DAILY ACTIVITIY SCORE: 24
MOBILITY SCORE: 24
PATIENT BASELINE BEDBOUND: NO
DAILY ACTIVITIY SCORE: 24
MOBILITY SCORE: 24

## 2024-01-19 ASSESSMENT — LIFESTYLE VARIABLES
EVER HAD A DRINK FIRST THING IN THE MORNING TO STEADY YOUR NERVES TO GET RID OF A HANGOVER: NO
HOW OFTEN DO YOU HAVE A DRINK CONTAINING ALCOHOL: NEVER
SKIP TO QUESTIONS 9-10: 1
HOW OFTEN DO YOU HAVE 6 OR MORE DRINKS ON ONE OCCASION: NEVER
HOW MANY STANDARD DRINKS CONTAINING ALCOHOL DO YOU HAVE ON A TYPICAL DAY: PATIENT DOES NOT DRINK
HAVE YOU EVER FELT YOU SHOULD CUT DOWN ON YOUR DRINKING: NO
PRESCIPTION_ABUSE_PAST_12_MONTHS: NO
AUDIT-C TOTAL SCORE: 0
REASON UNABLE TO ASSESS: NO
HAVE PEOPLE ANNOYED YOU BY CRITICIZING YOUR DRINKING: NO
EVER FELT BAD OR GUILTY ABOUT YOUR DRINKING: NO
SUBSTANCE_ABUSE_PAST_12_MONTHS: NO
AUDIT-C TOTAL SCORE: 0

## 2024-01-19 ASSESSMENT — PAIN SCALES - GENERAL
PAINLEVEL_OUTOF10: 1
PAINLEVEL_OUTOF10: 5 - MODERATE PAIN
PAINLEVEL_OUTOF10: 1

## 2024-01-19 NOTE — ED PROVIDER NOTES
HPI   Chief Complaint   Patient presents with    Abdominal Pain       73-year-old male past medical history of DVT on Xarelto, recurrent SBO, hypertension presents to the ED for abdominal pain, started sometime this morning, shortly after eating breakfast with associated nausea, in the bilateral lower quadrant areas feels like aching sensation, he has not vomited, has had a normal bowel movement this morning, has not had any bloody stools or diarrhea.  He denies any sick symptoms, no cough, congestion, rhinorrhea or fevers or chills.    Of note, recent colonoscopy performed last week.  His previous surgical history is notable for exploratory laparotomy that was performed 10 years ago following complications from a colonoscopy over 10 years ago.                              Adelita Coma Scale Score: 15                  Patient History   Past Medical History:   Diagnosis Date    Body mass index (BMI) 36.0-36.9, adult 02/15/2021    Body mass index (BMI) of 36.0 to 36.9 in adult    Colon immotility     Unspecified blepharoconjunctivitis, left eye 09/15/2021    Blepharoconjunctivitis of left eye     Past Surgical History:   Procedure Laterality Date    COLONOSCOPY  11/26/2012    Complete Colonoscopy    EYE SURGERY  10/08/2013    Eye Surgery    OTHER SURGICAL HISTORY  11/26/2012    Right Hemicolectomy     Family History   Problem Relation Name Age of Onset    Stroke Mother      Coronary artery disease Father       Social History     Tobacco Use    Smoking status: Never    Smokeless tobacco: Never   Vaping Use    Vaping Use: Never used   Substance Use Topics    Alcohol use: Not Currently    Drug use: Never       Physical Exam   ED Triage Vitals [01/19/24 1721]   Temp Heart Rate Respirations BP   36.3 °C (97.3 °F) 65 18 (!) 191/86      Pulse Ox Temp Source Heart Rate Source Patient Position   95 % Temporal Monitor Sitting      BP Location FiO2 (%)     Right arm --       Physical Exam  Vitals and nursing note reviewed.    Constitutional:       General: He is not in acute distress.     Appearance: Normal appearance. He is well-developed.   HENT:      Head: Normocephalic and atraumatic.      Mouth/Throat:      Mouth: Mucous membranes are moist.   Eyes:      Extraocular Movements: Extraocular movements intact.      Conjunctiva/sclera: Conjunctivae normal.   Cardiovascular:      Rate and Rhythm: Normal rate and regular rhythm.      Heart sounds: Normal heart sounds. No murmur heard.  Pulmonary:      Effort: Pulmonary effort is normal. No respiratory distress.      Breath sounds: Normal breath sounds. No wheezing.   Abdominal:      General: There is no distension.      Palpations: Abdomen is soft.      Tenderness: There is no abdominal tenderness. There is no guarding.      Comments: In the infraumbilical region, there is a midline the old surgical scar from previous laparotomy, there is no tenderness to palpation.   Musculoskeletal:         General: No swelling.      Cervical back: Neck supple.      Right lower leg: No edema.      Left lower leg: No edema.   Skin:     General: Skin is warm and dry.      Capillary Refill: Capillary refill takes less than 2 seconds.   Neurological:      General: No focal deficit present.      Mental Status: He is alert and oriented to person, place, and time.   Psychiatric:         Mood and Affect: Mood normal.         Behavior: Behavior normal.         ED Course & MDM   ED Course as of 01/19/24 2243 Fri Jan 19, 2024 1845 Patient reported worsening pain, requesting pain medication order for 0.5 mg IV Dilaudid. [VH]   2242 My interpretation of KUB, NG tube is below diaphragm, but can still be advanced 5 cm. [VH]      ED Course User Index  [VH] Mauricio Delgaidllo DO         Diagnoses as of 01/19/24 2243   Abdominal pain, unspecified abdominal location   Small bowel obstruction (CMS/HCC)       Medical Decision Making  Mildly hypertensive on arrival to the ED, but comfortable appearing.  He presents for  abdominal pain with recurrent history of small bowel obstruction.  Will obtain basic lab work to include lactate, CBC, preop labs, CT of the abdomen pelvis with IV contrast, IV fluids and Zofran for symptom management.    Patient's laboratory workup is unremarkable, CMP does not demonstrate any evidence of electrolyte abnormalities lactate is not elevated, CBC does not appear infectious, no thrombocytopenia, lipase is not consistent with pancreatitis, troponins not consistent with ACS.    The CT abdomen pelvis demonstrates abnormal bowel gas pattern prominent fluid-filled loops of small bowel, per radiology, small bowel obstruction cannot be excluded.  The patient continues to report worsening pain, and is redosed Dilaudid and Zofran x 2, I redrew her lactate which was mildly elevated as well.  Recommending for NG tube placement, medicine admission.    Further fluid resuscitation was delivered.  I consulted the general surgeon Dr. Dee ,Explained the plan to the patient he is agreeable for admission, was redosed with Dilaudid.    NG tube placed, order for KUB was also placed.  Discussed case with hospitalist Dr. Garcias who accepts patient for further workup and management    Discussed with the attending  Mauricio Delgadillo DO PGY-4  Emergency Medicine        Procedure  Procedures     Mauricio Delgadillo DO  Resident  01/19/24 7595       Mauricio Delgadillo DO  Resident  01/19/24 1807

## 2024-01-20 ENCOUNTER — APPOINTMENT (OUTPATIENT)
Dept: RADIOLOGY | Facility: HOSPITAL | Age: 74
DRG: 389 | End: 2024-01-20
Payer: MEDICARE

## 2024-01-20 LAB
ALBUMIN SERPL BCP-MCNC: 4 G/DL (ref 3.4–5)
ALP SERPL-CCNC: 68 U/L (ref 33–136)
ALT SERPL W P-5'-P-CCNC: 22 U/L (ref 10–52)
ANION GAP SERPL CALC-SCNC: 15 MMOL/L (ref 10–20)
AST SERPL W P-5'-P-CCNC: 17 U/L (ref 9–39)
BILIRUB SERPL-MCNC: 0.7 MG/DL (ref 0–1.2)
BUN SERPL-MCNC: 18 MG/DL (ref 6–23)
CALCIUM SERPL-MCNC: 8.8 MG/DL (ref 8.6–10.3)
CHLORIDE SERPL-SCNC: 103 MMOL/L (ref 98–107)
CO2 SERPL-SCNC: 23 MMOL/L (ref 21–32)
CREAT SERPL-MCNC: 1.06 MG/DL (ref 0.5–1.3)
EGFRCR SERPLBLD CKD-EPI 2021: 74 ML/MIN/1.73M*2
ERYTHROCYTE [DISTWIDTH] IN BLOOD BY AUTOMATED COUNT: 12.9 % (ref 11.5–14.5)
GLUCOSE SERPL-MCNC: 144 MG/DL (ref 74–99)
HCT VFR BLD AUTO: 46.8 % (ref 41–52)
HGB BLD-MCNC: 15.8 G/DL (ref 13.5–17.5)
HOLD SPECIMEN: NORMAL
LACTATE SERPL-SCNC: 1.6 MMOL/L (ref 0.4–2)
LACTATE SERPL-SCNC: 2.5 MMOL/L (ref 0.4–2)
LACTATE SERPL-SCNC: 2.6 MMOL/L (ref 0.4–2)
MAGNESIUM SERPL-MCNC: 2.24 MG/DL (ref 1.6–2.4)
MCH RBC QN AUTO: 32.2 PG (ref 26–34)
MCHC RBC AUTO-ENTMCNC: 33.8 G/DL (ref 32–36)
MCV RBC AUTO: 96 FL (ref 80–100)
NRBC BLD-RTO: 0 /100 WBCS (ref 0–0)
PLATELET # BLD AUTO: 161 X10*3/UL (ref 150–450)
POTASSIUM SERPL-SCNC: 4.2 MMOL/L (ref 3.5–5.3)
PROT SERPL-MCNC: 6.5 G/DL (ref 6.4–8.2)
RBC # BLD AUTO: 4.9 X10*6/UL (ref 4.5–5.9)
SODIUM SERPL-SCNC: 137 MMOL/L (ref 136–145)
WBC # BLD AUTO: 12.5 X10*3/UL (ref 4.4–11.3)

## 2024-01-20 PROCEDURE — 80053 COMPREHEN METABOLIC PANEL: CPT | Performed by: INTERNAL MEDICINE

## 2024-01-20 PROCEDURE — 36415 COLL VENOUS BLD VENIPUNCTURE: CPT | Performed by: INTERNAL MEDICINE

## 2024-01-20 PROCEDURE — 74018 RADEX ABDOMEN 1 VIEW: CPT

## 2024-01-20 PROCEDURE — 85027 COMPLETE CBC AUTOMATED: CPT | Performed by: INTERNAL MEDICINE

## 2024-01-20 PROCEDURE — 99222 1ST HOSP IP/OBS MODERATE 55: CPT | Performed by: SURGERY

## 2024-01-20 PROCEDURE — 1200000002 HC GENERAL ROOM WITH TELEMETRY DAILY

## 2024-01-20 PROCEDURE — 99233 SBSQ HOSP IP/OBS HIGH 50: CPT | Performed by: NURSE PRACTITIONER

## 2024-01-20 PROCEDURE — 2500000004 HC RX 250 GENERAL PHARMACY W/ HCPCS (ALT 636 FOR OP/ED): Performed by: INTERNAL MEDICINE

## 2024-01-20 PROCEDURE — 83605 ASSAY OF LACTIC ACID: CPT | Performed by: INTERNAL MEDICINE

## 2024-01-20 PROCEDURE — 83735 ASSAY OF MAGNESIUM: CPT | Performed by: NURSE PRACTITIONER

## 2024-01-20 PROCEDURE — 83605 ASSAY OF LACTIC ACID: CPT | Performed by: NURSE PRACTITIONER

## 2024-01-20 PROCEDURE — C9113 INJ PANTOPRAZOLE SODIUM, VIA: HCPCS | Performed by: INTERNAL MEDICINE

## 2024-01-20 PROCEDURE — 74018 RADEX ABDOMEN 1 VIEW: CPT | Performed by: RADIOLOGY

## 2024-01-20 RX ADMIN — METOCLOPRAMIDE 10 MG: 5 INJECTION, SOLUTION INTRAMUSCULAR; INTRAVENOUS at 15:47

## 2024-01-20 RX ADMIN — ENOXAPARIN SODIUM 40 MG: 40 INJECTION SUBCUTANEOUS at 22:39

## 2024-01-20 RX ADMIN — HYDROMORPHONE HYDROCHLORIDE 0.4 MG: 1 INJECTION, SOLUTION INTRAMUSCULAR; INTRAVENOUS; SUBCUTANEOUS at 08:21

## 2024-01-20 RX ADMIN — METOCLOPRAMIDE 10 MG: 5 INJECTION, SOLUTION INTRAMUSCULAR; INTRAVENOUS at 08:21

## 2024-01-20 RX ADMIN — PANTOPRAZOLE SODIUM 40 MG: 40 INJECTION, POWDER, FOR SOLUTION INTRAVENOUS at 06:24

## 2024-01-20 RX ADMIN — HYDROMORPHONE HYDROCHLORIDE 0.4 MG: 1 INJECTION, SOLUTION INTRAMUSCULAR; INTRAVENOUS; SUBCUTANEOUS at 15:47

## 2024-01-20 ASSESSMENT — COGNITIVE AND FUNCTIONAL STATUS - GENERAL
MOBILITY SCORE: 24
DAILY ACTIVITIY SCORE: 24
MOBILITY SCORE: 24

## 2024-01-20 ASSESSMENT — PAIN SCALES - GENERAL
PAINLEVEL_OUTOF10: 4
PAINLEVEL_OUTOF10: 6
PAINLEVEL_OUTOF10: 6
PAINLEVEL_OUTOF10: 4
PAINLEVEL_OUTOF10: 0 - NO PAIN
PAINLEVEL_OUTOF10: 5 - MODERATE PAIN

## 2024-01-20 ASSESSMENT — PAIN - FUNCTIONAL ASSESSMENT
PAIN_FUNCTIONAL_ASSESSMENT: 0-10

## 2024-01-20 ASSESSMENT — PAIN DESCRIPTION - DESCRIPTORS: DESCRIPTORS: ACHING

## 2024-01-20 ASSESSMENT — PAIN DESCRIPTION - LOCATION: LOCATION: ABDOMEN

## 2024-01-20 ASSESSMENT — ENCOUNTER SYMPTOMS
ABDOMINAL DISTENTION: 1
ABDOMINAL PAIN: 0

## 2024-01-20 NOTE — PROGRESS NOTES
Jovanny Yap is a 73 y.o. male on day 1 of admission presenting with Abdominal pain, unspecified abdominal location.    Subjective   Patient seen   Lying in bed, appears uncomfortable  States just received pain medications and Zofran      Objective     Physical Exam  Constitutional:       Appearance: Normal appearance.   HENT:      Head: Normocephalic and atraumatic.      Mouth: Mucous membranes are moist.   Eyes:      Extraocular Movements: Extraocular movements intact.      Conjunctiva/sclera: Conjunctivae normal.      Pupils: Pupils are equal, round, and reactive to light.   Cardiovascular:      Rate and Rhythm: Normal rate and regular rhythm.      Pulses: Normal pulses.      Heart sounds: Normal heart sounds.   Pulmonary:      Effort: Pulmonary effort is normal.      Breath sounds: Normal breath sounds.   Abdominal:      General: Bowel sounds are normal. There is distension. NG to LIS     Palpations: Abdomen is soft. BS present, hypoactive      Tenderness: There is no abdominal tenderness. There is no guarding or rebound.   Musculoskeletal:         General: Normal range of motion.      Cervical back: Normal range of motion and neck supple.   Skin:     General: Skin is warm and dry.   Neurological:      General: No focal deficit present.      Mental Status: He is alert. Mental status is at baseline.   Psychiatric:         Mood and Affect: Mood normal.         Behavior: Behavior normal.     Last Recorded Vitals  Blood pressure (!) 166/98, pulse 72, temperature 36 °C (96.8 °F), temperature source Temporal, resp. rate 16, height 1.829 m (6'), weight 109 kg (240 lb), SpO2 96 %.  Intake/Output last 3 Shifts:  I/O last 3 completed shifts:  In: 1000 (9.2 mL/kg) [IV Piggyback:1000]  Out: 1250 (11.5 mL/kg) [Urine:550 (0.1 mL/kg/hr); Emesis/NG output:400; Other:300]  Weight: 108.9 kg     Relevant Results  Scheduled medications  enoxaparin, 40 mg, subcutaneous, q24h  pantoprazole, 40 mg, oral, Daily before breakfast    Or  pantoprazole, 40 mg, intravenous, Daily before breakfast      Continuous medications  sodium chloride 0.9%, 75 mL/hr, Last Rate: 75 mL/hr (01/19/24 3149)      PRN medications  PRN medications: acetaminophen **OR** acetaminophen **OR** acetaminophen, acetaminophen **OR** acetaminophen **OR** acetaminophen, HYDROmorphone, HYDROmorphone, melatonin, metoclopramide **OR** metoclopramide, ondansetron ODT **OR** ondansetron, polyethylene glycol  Results from last 7 days   Lab Units 01/20/24  0545 01/19/24  1735   WBC AUTO x10*3/uL 12.5* 9.3   RBC AUTO x10*6/uL 4.90 5.18   HEMOGLOBIN g/dL 15.8 16.6     Results from last 7 days   Lab Units 01/20/24  0545 01/19/24  1735   SODIUM mmol/L 137 137   POTASSIUM mmol/L 4.2 4.1   CHLORIDE mmol/L 103 101   CO2 mmol/L 23 26   BUN mg/dL 18 17   CREATININE mg/dL 1.06 1.15   CALCIUM mg/dL 8.8 9.3   MAGNESIUM mg/dL  --  2.29   BILIRUBIN TOTAL mg/dL 0.7 0.6   ALT U/L 22 27   AST U/L 17 20       XR abdomen 1 view    Result Date: 1/19/2024  INDICATION: NG Tube placement. COMPARISON: 10/24/2023 . TECHNIQUE:  AP supine view(s) of the abdomen. FINDINGS:  The right half of the abdomen was not imaged. An NG tube is present with the tip in a region which would correspond to the body of the stomach.  The side-port is in a region which would correspond to being at the gastroesophageal junction. Motion artifact is present.  There appears to be an air-filled loop of small bowel in the mid abdomen.  Free air is not ruled out on this study.      NG tube. Signed by Jhonny Bray MD    CT abdomen pelvis w IV contrast    Result Date: 1/19/2024  STUDY: CT Abdomen and Pelvis with IV Contrast; 01/19/2024 7:24 pm INDICATION: Abdominal pain.  History of small bowel obstruction. COMPARISON: CT A/P 11/14/2023 and 10/22/2023. ACCESSION NUMBER(S): [id number] ORDERING CLINICIAN: KRYSTA REYNOLDS TECHNIQUE: CT of the abdomen and pelvis was performed.  Contiguous axial images were obtained at 3 mm slice  thickness through the abdomen and pelvis. Coronal and sagittal reconstructions at 3 mm slice thickness were performed.  Omnipaque 350, 75 mL was administered intravenously.  FINDINGS: LOWER CHEST: No cardiomegaly.  No pericardial effusion.  Lung bases are clear.  ABDOMEN:  LIVER: No hepatomegaly.  Diffuse decreased attenuation throughout the liver. No focal lesions.  BILE DUCTS: No biliary ductal dilatation.  GALLBLADDER: The gallbladder is unremarkable in CT appearance. STOMACH: Moderate fluid and food in the stomach.  PANCREAS: No masses or ductal dilatation.  SPLEEN: No splenomegaly or focal splenic lesion.  ADRENAL GLANDS: No thickening or nodules.  KIDNEYS AND URETERS: Kidneys are normal in size and location.  Numerous large bilateral renal cysts.  No renal calculi.  No hydronephrosis.  PELVIS:  BLADDER: No abnormalities identified.  REPRODUCTIVE ORGANS: No abnormalities identified.  BOWEL: Abnormal bowel gas pattern.  Multiple mildly prominent fluid-filled loops of small bowel present measuring up to 3.5 cm in diameter.  No transition.  Prior right hemicolectomy with ileocolic anastomosis in the right hemiabdomen.  Normal amount stool in the colon.  Moderate colonic diverticulosis.  VESSELS: No abnormalities identified.  Mild atherosclerotic vascular calcifications.  PERITONEUM/RETROPERITONEUM/LYMPH NODES: No free fluid.  No pneumoperitoneum. No lymphadenopathy.  ABDOMINAL WALL: Again demonstrated is diastases of the anterior abdominal wall in the midline.  This is associated small hernia in the right lower quadrant which contains an otherwise normal looking loop of small bowel.  There is also a small hernia in the left lower quadrant.  Previously this only contains fat but currently a portion of the colon partially extends into this hernia without evidence of obstruction or inflammation. Small fat-containing left inguinal hernia. SOFT TISSUES: No abnormalities identified.  BONES: No acute fracture or  aggressive osseous lesion.    Abnormal bowel gas pattern with numerous prominent fluid-filled loops of small bowel, similar to the 11/14/2023 examination.  I cannot exclude a partial small bowel obstruction but the appearance is more compatible with ileus or enteritis.  The patient has had a prior right hemicolectomy with an intact ileocolic anastomosis in the right abdomen.  Moderate colonic diverticulosis.  No focal inflammatory changes.  No evidence of free fluid or free air. Fatty infiltration of the liver.  The gallbladder is unremarkable in CT appearance.  No evidence of biliary ductal dilatation. Numerous large bilateral renal cysts.  No renal calculi.  No hydronephrosis. Signed by Bob Hansen MD       Assessment/Plan   Principal Problem:    Abdominal pain, unspecified abdominal location  Active Problems:    Anxiety disorder    GERD (gastroesophageal reflux disease)    Hyperlipidemia    Small bowel obstruction (CMS/HCC)    Generalized abdominal pain    Lactic acidosis    Plan:  -cont NPO status and maintain NG tube to LIS  -gen surg on consult-await further recommendations  -KUB ordered for this am , not resulted  -cont pain management with IV Dilaudid  -cont NS at 75 ml hour  -cont Zofran, Reglan anti nausea medications  -am labs including cbc, bmp, mag  -PT OT consulted  -POC discussed with patient, patient's wife at bedside and attending at bedside  -dispo: follow gen surg recommendations  I spent >40 minutes in the professional and overall care of this patient.    FULL CODE    JOURDAN [facility]  [address] [address], [place]  [address]  Phone: [phone] Fax: [phone]

## 2024-01-20 NOTE — CONSULTS
Reason For Consult  SBO    History Of Present Illness  Jovanny Yap is a 73 y.o. male presenting with abdominal pain and nausea x 2 days.  The patient has a history of recurrent obstructions, likely secondary to adhesions, over the past year.  He reports requiring hospitalization three times for this, but all were treated non-operatively.  He states that this episode feels like the past episodes.  The pain is located in the b/l lower quadrants.  It is described as a cramping sensation which is constant, severe, and non-radiating.  (+) nausea  (-) emesis  (-) flatus  He had one BM shortly after the onset of symptoms, but none since.  (-) fever/chills  The patient presented to the hospital and has had an NGT placed.  Today, he feels improved, but there is still a sore sensation in his abdomen.    Of note, the patient has a known ventral hernia which has been evaluated by Dr. Kemp in the office.  The patient states that he was referred to a Dr. Espino, who he has not yet seen, for consultation for repair.  Additionally, he had a colonoscopy two weeks ago, but was not having any symptoms subsequently.     Past Medical History  Obesity, anxiety, hypercholesterolemia, LLE DVT, BPH, GERD, colon polyps, diverticulosis, ventral hernia, iatrogenic colon perforation    Surgical History  Colonoscopy, right hemicolectomy     Social History  He reports that he has never smoked. He has never used smokeless tobacco. He reports that he does not currently use alcohol. He reports that he does not use drugs.    Family History  Family History   Problem Relation Name Age of Onset    Stroke Mother      Coronary artery disease Father          Allergies  Patient has no known allergies.    Review of Systems  The patient denies acute visual changes, headaches, difficulty swallowing, muscle or joint pain.  All other systems are negative.     Physical Exam  General: NAD  Neuro: A + O x 3  Skin: warm, dry  Head: NCAT  Eyes: PERRL, EOMI  Nose:  NGT in place  Neck: supple, non-tender  Heart: RRR, (-) murmurs, (-) gallops, (-) rubs  Lungs: CTA b/l, (-) wheezes, (-) crackles, (-) rhonchi  Abdomen: soft, obese, non-tender, (+) distended, (+) tympany, (-) guarding, (-) rebound  Extremities: (-) edema  Musculoskeletal: moves all extremities spontaneously and symmetrically  Vascular: 2+ palpable L radial pulse     Last Recorded Vitals  Blood pressure (!) 166/98, pulse 72, temperature 36 °C (96.8 °F), temperature source Temporal, resp. rate 16, height 1.829 m (6'), weight 109 kg (240 lb), SpO2 96 %.    Relevant Results  Results for orders placed or performed during the hospital encounter of 01/19/24 (from the past 24 hour(s))   CBC and Auto Differential   Result Value Ref Range    WBC 9.3 4.4 - 11.3 x10*3/uL    nRBC 0.0 0.0 - 0.0 /100 WBCs    RBC 5.18 4.50 - 5.90 x10*6/uL    Hemoglobin 16.6 13.5 - 17.5 g/dL    Hematocrit 48.4 41.0 - 52.0 %    MCV 93 80 - 100 fL    MCH 32.0 26.0 - 34.0 pg    MCHC 34.3 32.0 - 36.0 g/dL    RDW 12.7 11.5 - 14.5 %    Platelets 151 150 - 450 x10*3/uL    Neutrophils % 79.8 40.0 - 80.0 %    Immature Granulocytes %, Automated 0.2 0.0 - 0.9 %    Lymphocytes % 12.2 13.0 - 44.0 %    Monocytes % 6.7 2.0 - 10.0 %    Eosinophils % 0.8 0.0 - 6.0 %    Basophils % 0.3 0.0 - 2.0 %    Neutrophils Absolute 7.43 (H) 1.60 - 5.50 x10*3/uL    Immature Granulocytes Absolute, Automated 0.02 0.00 - 0.50 x10*3/uL    Lymphocytes Absolute 1.14 0.80 - 3.00 x10*3/uL    Monocytes Absolute 0.62 0.05 - 0.80 x10*3/uL    Eosinophils Absolute 0.07 0.00 - 0.40 x10*3/uL    Basophils Absolute 0.03 0.00 - 0.10 x10*3/uL   Comprehensive metabolic panel   Result Value Ref Range    Glucose 122 (H) 74 - 99 mg/dL    Sodium 137 136 - 145 mmol/L    Potassium 4.1 3.5 - 5.3 mmol/L    Chloride 101 98 - 107 mmol/L    Bicarbonate 26 21 - 32 mmol/L    Anion Gap 14 10 - 20 mmol/L    Urea Nitrogen 17 6 - 23 mg/dL    Creatinine 1.15 0.50 - 1.30 mg/dL    eGFR 67 >60 mL/min/1.73m*2     Calcium 9.3 8.6 - 10.3 mg/dL    Albumin 4.5 3.4 - 5.0 g/dL    Alkaline Phosphatase 71 33 - 136 U/L    Total Protein 7.2 6.4 - 8.2 g/dL    AST 20 9 - 39 U/L    Bilirubin, Total 0.6 0.0 - 1.2 mg/dL    ALT 27 10 - 52 U/L   Magnesium   Result Value Ref Range    Magnesium 2.29 1.60 - 2.40 mg/dL   Lactate   Result Value Ref Range    Lactate 1.6 0.4 - 2.0 mmol/L   Protime-INR   Result Value Ref Range    Protime 11.7 9.8 - 12.8 seconds    INR 1.0 0.9 - 1.1   Troponin I, High Sensitivity   Result Value Ref Range    Troponin I, High Sensitivity 9 0 - 20 ng/L   Lipase   Result Value Ref Range    Lipase 33 9 - 82 U/L   Sars-CoV-2 and Influenza A/B PCR   Result Value Ref Range    Flu A Result Not Detected Not Detected    Flu B Result Not Detected Not Detected    Coronavirus 2019, PCR Not Detected Not Detected   Urinalysis with Reflex Culture and Microscopic   Result Value Ref Range    Color, Urine Yellow Straw, Yellow    Appearance, Urine Clear Clear    Specific Gravity, Urine 1.018 1.005 - 1.035    pH, Urine 6.0 5.0, 5.5, 6.0, 6.5, 7.0, 7.5, 8.0    Protein, Urine NEGATIVE NEGATIVE mg/dL    Glucose, Urine NEGATIVE NEGATIVE mg/dL    Blood, Urine NEGATIVE NEGATIVE    Ketones, Urine NEGATIVE NEGATIVE mg/dL    Bilirubin, Urine NEGATIVE NEGATIVE    Urobilinogen, Urine <2.0 <2.0 mg/dL    Nitrite, Urine NEGATIVE NEGATIVE    Leukocyte Esterase, Urine NEGATIVE NEGATIVE   Extra Urine Gray Tube   Result Value Ref Range    Extra Tube Hold for add-ons.    Lactate   Result Value Ref Range    Lactate 2.1 (H) 0.4 - 2.0 mmol/L   Lactate   Result Value Ref Range    Lactate 2.4 (H) 0.4 - 2.0 mmol/L   Lactate   Result Value Ref Range    Lactate 2.6 (H) 0.4 - 2.0 mmol/L   Lactate   Result Value Ref Range    Lactate 2.5 (H) 0.4 - 2.0 mmol/L   CBC   Result Value Ref Range    WBC 12.5 (H) 4.4 - 11.3 x10*3/uL    nRBC 0.0 0.0 - 0.0 /100 WBCs    RBC 4.90 4.50 - 5.90 x10*6/uL    Hemoglobin 15.8 13.5 - 17.5 g/dL    Hematocrit 46.8 41.0 - 52.0 %    MCV 96 80  - 100 fL    MCH 32.2 26.0 - 34.0 pg    MCHC 33.8 32.0 - 36.0 g/dL    RDW 12.9 11.5 - 14.5 %    Platelets 161 150 - 450 x10*3/uL   Comprehensive metabolic panel   Result Value Ref Range    Glucose 144 (H) 74 - 99 mg/dL    Sodium 137 136 - 145 mmol/L    Potassium 4.2 3.5 - 5.3 mmol/L    Chloride 103 98 - 107 mmol/L    Bicarbonate 23 21 - 32 mmol/L    Anion Gap 15 10 - 20 mmol/L    Urea Nitrogen 18 6 - 23 mg/dL    Creatinine 1.06 0.50 - 1.30 mg/dL    eGFR 74 >60 mL/min/1.73m*2    Calcium 8.8 8.6 - 10.3 mg/dL    Albumin 4.0 3.4 - 5.0 g/dL    Alkaline Phosphatase 68 33 - 136 U/L    Total Protein 6.5 6.4 - 8.2 g/dL    AST 17 9 - 39 U/L    Bilirubin, Total 0.7 0.0 - 1.2 mg/dL    ALT 22 10 - 52 U/L   Magnesium   Result Value Ref Range    Magnesium 2.24 1.60 - 2.40 mg/dL   Lactate   Result Value Ref Range    Lactate 1.6 0.4 - 2.0 mmol/L      The CT scan of the abdomen and pelvis with IV contrast was reviewed by myself, demonstrating multiple loops of dilated and fluid-filled small bowel.  The stomach is markedly distended.  (-) free air  (-) free fluid     Assessment/Plan     Small bowel obstruction    The patient's obstruction is likely secondary to adhesions and does not appear to be due to his ventral hernia.  Continue NPO, NGT, IVF.  I ordered a small bowel series, but unfortunately this is not available over the weekend, so it will be performed on Monday.  The patient does not require acute surgical intervention at this time.  However, if he does not improve or certainly if he worsens then he may come to need surgery during this admission.    I spent 60 minutes in the professional and overall care of this patient.      Misael Dee MD

## 2024-01-20 NOTE — CARE PLAN
The patient's goals for the shift include      The clinical goals for the shift include Pt will voice pain less than 5/10 by end of shift 1/20/24    Goal met at this time - Pt medicated per orders PRN.

## 2024-01-20 NOTE — CARE PLAN
Problem: Pain  Goal: My pain/discomfort is manageable  Outcome: Progressing     Problem: Safety  Goal: Patient will be injury free during hospitalization  Outcome: Progressing  Goal: I will remain free of falls  Outcome: Progressing     Problem: Daily Care  Goal: Daily care needs are met  Outcome: Progressing     Problem: Psychosocial Needs  Goal: Demonstrates ability to cope with hospitalization/illness  Outcome: Progressing  Goal: Collaborate with me, my family, and caregiver to identify my specific goals  Outcome: Progressing  Flowsheets (Taken 1/19/2024 9277)  Cultural Requests During Hospitalization: none  Spiritual Requests During Hospitalization: none     Problem: Discharge Barriers  Goal: My discharge needs are met  Outcome: Progressing     Problem: Pain  Goal: Takes deep breaths with improved pain control throughout the shift  Outcome: Progressing  Goal: Turns in bed with improved pain control throughout the shift  Outcome: Progressing  Goal: Walks with improved pain control throughout the shift  Outcome: Progressing  Goal: Performs ADL's with improved pain control throughout shift  Outcome: Progressing  Goal: Participates in PT with improved pain control throughout the shift  Outcome: Progressing  Goal: Free from opioid side effects throughout the shift  Outcome: Progressing  Goal: Free from acute confusion related to pain meds throughout the shift  Outcome: Progressing   The patient's goals for the shift include      The clinical goals for the shift include Pt will be free of nauesa and receive adequate rest.    Over the shift, the patient did receive adequate rest.

## 2024-01-20 NOTE — NURSING NOTE
0830 - Pt c/o nausea and just overall uncomfortableness. Pt wife at bedside and anxious about how pt is feeling. This nurse medicated pt and assessment completed. Xray pending and attending has not rounded yet. Call light in reach and s/s to report reviewed.     1710 - End of shift note - Pt stable this shift. He was up to the bathroom several times. Pt is noted to be passing gas. He had a large formed BM this afternoon. Attending notified however surgeon is off already and NG to continue until pt evaluated by surgeon tomorrow. He has been medicated for pain PRN. Wife was at bedside most of the day and has been updated. No current needs voiced and call light in reach.

## 2024-01-20 NOTE — H&P
History Of Present Illness  Jovanny Yap is a 73 y.o. male presenting with chief complaint of lower quadrant abdominal pain that was described as a bandlike pressure sensation which began more than 24 hours prior to arrival.  Patient states his symptoms began the day prior to arrival.  However upon waking this morning, he felt increased pressure and bloating sensation, almost described as a tightness across his lower abdomen.  He endorses a history of multiple prior abdominal surgeries which he states have left him with a considerable amount of scar tissue and subsequently, has dealt with recurrent small bowel obstructions since that time.  States that he was last evaluated at this facility approximately 3 months prior for similar symptoms.  Imaging at that time did not show any evidence of complete or partial obstruction and his symptoms improved with conservative management.  Patient does state he did recently undergo colonoscopy within the last week.  In addition, he states he is scheduled to follow-up in the outpatient setting with general surgery for evaluation of known incisional hernia which patient states has become more pronounced.  At the time of arrival, patient was uncomfortable and states that prior to NG decompression, had intractable nausea.  Resting comfortably at the time of my assessment.  Denies any abdominal pain at this time, headache, dizziness, fever or chills.  Imaging study showed evidence of partial small bowel obstruction.  Case discussed with surgical service.  Admitted for further management.     Past Medical History  Past Medical History:   Diagnosis Date    Body mass index (BMI) 36.0-36.9, adult 02/15/2021    Body mass index (BMI) of 36.0 to 36.9 in adult    Colon immotility     Unspecified blepharoconjunctivitis, left eye 09/15/2021    Blepharoconjunctivitis of left eye       Surgical History  Past Surgical History:   Procedure Laterality Date    COLONOSCOPY  11/26/2012    Complete  Colonoscopy    EYE SURGERY  10/08/2013    Eye Surgery    OTHER SURGICAL HISTORY  11/26/2012    Right Hemicolectomy        Social History  He reports that he has never smoked. He has never used smokeless tobacco. He reports that he does not currently use alcohol. He reports that he does not use drugs.    Family History  Family History   Problem Relation Name Age of Onset    Stroke Mother      Coronary artery disease Father          Allergies  Patient has no known allergies.    Review of Systems   Gastrointestinal:  Positive for abdominal distention. Negative for abdominal pain.   All other systems reviewed and are negative.       Physical Exam  Vitals reviewed.   Constitutional:       Appearance: Normal appearance.   HENT:      Head: Normocephalic and atraumatic.      Nose: Nose normal.      Mouth/Throat:      Mouth: Mucous membranes are moist.   Eyes:      Extraocular Movements: Extraocular movements intact.      Conjunctiva/sclera: Conjunctivae normal.      Pupils: Pupils are equal, round, and reactive to light.   Cardiovascular:      Rate and Rhythm: Normal rate and regular rhythm.      Pulses: Normal pulses.      Heart sounds: Normal heart sounds.   Pulmonary:      Effort: Pulmonary effort is normal.      Breath sounds: Normal breath sounds.   Abdominal:      General: Bowel sounds are normal. There is distension.      Palpations: Abdomen is soft.      Tenderness: There is no abdominal tenderness. There is no guarding or rebound.   Musculoskeletal:         General: Normal range of motion.      Cervical back: Normal range of motion and neck supple.   Skin:     General: Skin is warm and dry.   Neurological:      General: No focal deficit present.      Mental Status: He is alert. Mental status is at baseline.   Psychiatric:         Mood and Affect: Mood normal.         Behavior: Behavior normal.          Last Recorded Vitals  Blood pressure 170/79, pulse 65, temperature 36.3 °C (97.3 °F), temperature source Temporal,  resp. rate 18, height 1.829 m (6'), weight 109 kg (240 lb), SpO2 96 %.    Relevant Results  Scheduled medications  enoxaparin, 40 mg, subcutaneous, q24h  pantoprazole, 40 mg, oral, Daily before breakfast   Or  pantoprazole, 40 mg, intravenous, Daily before breakfast      Continuous medications  sodium chloride 0.9%, 75 mL/hr, Last Rate: 75 mL/hr (01/19/24 6156)      PRN medications  PRN medications: acetaminophen **OR** acetaminophen **OR** acetaminophen, acetaminophen **OR** acetaminophen **OR** acetaminophen, HYDROmorphone, HYDROmorphone, melatonin, metoclopramide **OR** metoclopramide, ondansetron ODT **OR** ondansetron, polyethylene glycol  XR abdomen 1 view    Result Date: 1/19/2024  INDICATION: NG Tube placement. COMPARISON: 10/24/2023 . TECHNIQUE:  AP supine view(s) of the abdomen. FINDINGS:  The right half of the abdomen was not imaged. An NG tube is present with the tip in a region which would correspond to the body of the stomach.  The side-port is in a region which would correspond to being at the gastroesophageal junction. Motion artifact is present.  There appears to be an air-filled loop of small bowel in the mid abdomen.  Free air is not ruled out on this study.      NG tube. Signed by Jhonny Bray MD    CT abdomen pelvis w IV contrast    Result Date: 1/19/2024  STUDY: CT Abdomen and Pelvis with IV Contrast; 01/19/2024 7:24 pm INDICATION: Abdominal pain.  History of small bowel obstruction. COMPARISON: CT A/P 11/14/2023 and 10/22/2023. ACCESSION NUMBER(S): [id number] ORDERING CLINICIAN: KRYSTA REYNOLDS TECHNIQUE: CT of the abdomen and pelvis was performed.  Contiguous axial images were obtained at 3 mm slice thickness through the abdomen and pelvis. Coronal and sagittal reconstructions at 3 mm slice thickness were performed.  Omnipaque 350, 75 mL was administered intravenously.  FINDINGS: LOWER CHEST: No cardiomegaly.  No pericardial effusion.  Lung bases are clear.  ABDOMEN:  LIVER: No  hepatomegaly.  Diffuse decreased attenuation throughout the liver. No focal lesions.  BILE DUCTS: No biliary ductal dilatation.  GALLBLADDER: The gallbladder is unremarkable in CT appearance. STOMACH: Moderate fluid and food in the stomach.  PANCREAS: No masses or ductal dilatation.  SPLEEN: No splenomegaly or focal splenic lesion.  ADRENAL GLANDS: No thickening or nodules.  KIDNEYS AND URETERS: Kidneys are normal in size and location.  Numerous large bilateral renal cysts.  No renal calculi.  No hydronephrosis.  PELVIS:  BLADDER: No abnormalities identified.  REPRODUCTIVE ORGANS: No abnormalities identified.  BOWEL: Abnormal bowel gas pattern.  Multiple mildly prominent fluid-filled loops of small bowel present measuring up to 3.5 cm in diameter.  No transition.  Prior right hemicolectomy with ileocolic anastomosis in the right hemiabdomen.  Normal amount stool in the colon.  Moderate colonic diverticulosis.  VESSELS: No abnormalities identified.  Mild atherosclerotic vascular calcifications.  PERITONEUM/RETROPERITONEUM/LYMPH NODES: No free fluid.  No pneumoperitoneum. No lymphadenopathy.  ABDOMINAL WALL: Again demonstrated is diastases of the anterior abdominal wall in the midline.  This is associated small hernia in the right lower quadrant which contains an otherwise normal looking loop of small bowel.  There is also a small hernia in the left lower quadrant.  Previously this only contains fat but currently a portion of the colon partially extends into this hernia without evidence of obstruction or inflammation. Small fat-containing left inguinal hernia. SOFT TISSUES: No abnormalities identified.  BONES: No acute fracture or aggressive osseous lesion.    Abnormal bowel gas pattern with numerous prominent fluid-filled loops of small bowel, similar to the 11/14/2023 examination.  I cannot exclude a partial small bowel obstruction but the appearance is more compatible with ileus or enteritis.  The patient has had a  prior right hemicolectomy with an intact ileocolic anastomosis in the right abdomen.  Moderate colonic diverticulosis.  No focal inflammatory changes.  No evidence of free fluid or free air. Fatty infiltration of the liver.  The gallbladder is unremarkable in CT appearance.  No evidence of biliary ductal dilatation. Numerous large bilateral renal cysts.  No renal calculi.  No hydronephrosis. Signed by Bob Hansen MD    Colonoscopy Screening    Result Date: 1/10/2024  Table formatting from the original result was not included. Impression Subcentimeter polyp in the rectum was removed with cold forceps biopsy The terminal ileum and entire colon appeared normal. Diverticulosis in the descending colon Findings 2 mm sessile polyp in the rectum; performed cold forceps biopsy All observed locations appeared normal, including the terminal ileum and entire colon. Including anastomosis; Few small diverticula in the descending colon  Recommendation  Await pathology results  Follow up with PCP  Repeat colonoscopy in 3 years Indication Colon cancer screening Staff Staff Role No Staff Documented Medications See Anesthesia Record. Preprocedure A history and physical has been performed, and patient medication allergies have been reviewed. The patient's tolerance of previous anesthesia has been reviewed. The risks and benefits of the procedure and the sedation options and risks were discussed with the patient. All questions were answered and informed consent obtained. Details of the Procedure The patient underwent monitored anesthesia care, which was administered by an anesthesia professional. The patient's blood pressure, ECG, ETCO2, heart rate, level of consciousness, oxygen and respirations were monitored throughout the procedure. A digital rectal exam was performed. The scope was introduced through the anus and advanced to the terminal ileum. Retroflexion was performed in the rectum. The quality of bowel preparation was  evaluated using the [place] Bowel Preparation Scale with scores of: right colon = 2, transverse colon = 2, left colon = 2. The total BBPS score was 6. Bowel prep was adequate. The patient experienced no blood loss. The procedure was not difficult. The patient tolerated the procedure well. There were no apparent adverse events. Events Procedure Events Event Event Time ENDO SCOPE IN TIME 1/10/2024 10:41 AM ENDO SCOPE IN TIME 1/10/2024 10:41 AM ENDO CECUM REACHED 1/10/2024 10:45 AM ENDO SCOPE OUT TIME 1/10/2024 10:53 AM Specimens ID Type Source Tests Collected by Time A :  Tissue RECTUM POLYPECTOMY TISSUE/WOUND CULTURE/SMEAR Mati Fernandez MD 1/10/2024 1051 [place] [facility] [place] [address] [facility]  [address] [facility] [id number] [phone] Referring Provider Jhonny Cuevas,  [address] [facility], [place] [address] [place] [address] Procedure Provider Mati Fernandez MD    Results for orders placed or performed during the hospital encounter of 01/19/24 (from the past 24 hour(s))   CBC and Auto Differential   Result Value Ref Range    WBC 9.3 4.4 - 11.3 x10*3/uL    nRBC 0.0 0.0 - 0.0 /100 WBCs    RBC 5.18 4.50 - 5.90 x10*6/uL    Hemoglobin 16.6 13.5 - 17.5 g/dL    Hematocrit 48.4 41.0 - 52.0 %    MCV 93 80 - 100 fL    MCH 32.0 26.0 - 34.0 pg    MCHC 34.3 32.0 - 36.0 g/dL    RDW 12.7 11.5 - 14.5 %    Platelets 151 150 - 450 x10*3/uL    Neutrophils % 79.8 40.0 - 80.0 %    Immature Granulocytes %, Automated 0.2 0.0 - 0.9 %    Lymphocytes % 12.2 13.0 - 44.0 %    Monocytes % 6.7 2.0 - 10.0 %    Eosinophils % 0.8 0.0 - 6.0 %    Basophils % 0.3 0.0 - 2.0 %    Neutrophils Absolute 7.43 (H) 1.60 - 5.50 x10*3/uL    Immature Granulocytes Absolute, Automated 0.02 0.00 - 0.50 x10*3/uL    Lymphocytes Absolute 1.14 0.80 - 3.00 x10*3/uL    Monocytes Absolute 0.62 0.05 - 0.80 x10*3/uL    Eosinophils Absolute 0.07 0.00 - 0.40 x10*3/uL    Basophils Absolute 0.03 0.00 - 0.10 x10*3/uL    Comprehensive metabolic panel   Result Value Ref Range    Glucose 122 (H) 74 - 99 mg/dL    Sodium 137 136 - 145 mmol/L    Potassium 4.1 3.5 - 5.3 mmol/L    Chloride 101 98 - 107 mmol/L    Bicarbonate 26 21 - 32 mmol/L    Anion Gap 14 10 - 20 mmol/L    Urea Nitrogen 17 6 - 23 mg/dL    Creatinine 1.15 0.50 - 1.30 mg/dL    eGFR 67 >60 mL/min/1.73m*2    Calcium 9.3 8.6 - 10.3 mg/dL    Albumin 4.5 3.4 - 5.0 g/dL    Alkaline Phosphatase 71 33 - 136 U/L    Total Protein 7.2 6.4 - 8.2 g/dL    AST 20 9 - 39 U/L    Bilirubin, Total 0.6 0.0 - 1.2 mg/dL    ALT 27 10 - 52 U/L   Magnesium   Result Value Ref Range    Magnesium 2.29 1.60 - 2.40 mg/dL   Lactate   Result Value Ref Range    Lactate 1.6 0.4 - 2.0 mmol/L   Protime-INR   Result Value Ref Range    Protime 11.7 9.8 - 12.8 seconds    INR 1.0 0.9 - 1.1   Troponin I, High Sensitivity   Result Value Ref Range    Troponin I, High Sensitivity 9 0 - 20 ng/L   Lipase   Result Value Ref Range    Lipase 33 9 - 82 U/L   Sars-CoV-2 and Influenza A/B PCR   Result Value Ref Range    Flu A Result Not Detected Not Detected    Flu B Result Not Detected Not Detected    Coronavirus 2019, PCR Not Detected Not Detected   Urinalysis with Reflex Culture and Microscopic   Result Value Ref Range    Color, Urine Yellow Straw, Yellow    Appearance, Urine Clear Clear    Specific Gravity, Urine 1.018 1.005 - 1.035    pH, Urine 6.0 5.0, 5.5, 6.0, 6.5, 7.0, 7.5, 8.0    Protein, Urine NEGATIVE NEGATIVE mg/dL    Glucose, Urine NEGATIVE NEGATIVE mg/dL    Blood, Urine NEGATIVE NEGATIVE    Ketones, Urine NEGATIVE NEGATIVE mg/dL    Bilirubin, Urine NEGATIVE NEGATIVE    Urobilinogen, Urine <2.0 <2.0 mg/dL    Nitrite, Urine NEGATIVE NEGATIVE    Leukocyte Esterase, Urine NEGATIVE NEGATIVE   Extra Urine Gray Tube   Result Value Ref Range    Extra Tube Hold for add-ons.    Lactate   Result Value Ref Range    Lactate 2.1 (H) 0.4 - 2.0 mmol/L   Lactate   Result Value Ref Range    Lactate 2.4 (H) 0.4 - 2.0 mmol/L    Lactate   Result Value Ref Range    Lactate 2.6 (H) 0.4 - 2.0 mmol/L   Lactate   Result Value Ref Range    Lactate 2.5 (H) 0.4 - 2.0 mmol/L          Assessment/Plan   Principal Problem:    Abdominal pain, unspecified abdominal location  Active Problems:    Anxiety disorder    GERD (gastroesophageal reflux disease)    Hyperlipidemia    Small bowel obstruction (CMS/HCC)    Generalized abdominal pain    Lactic acidosis      Patient presents for evaluation of abdominal distention, now resolved abdominal pain, with history of small bowel obstruction.  Imaging studies obtained upon arrival show multiple dilated loops of small bowel however these findings are somewhat similar to studies obtained in November 2023.  Clinically however, patient's symptoms consistent with obstruction.  Has endorsed significant relief following NG placement which remains set to low intermittent suction with at least 400 cc of bilious output.    At this time, continue nonsurgical management with gastric decompression via NG tube.  Patient to remain n.p.o. and tube to remain at low intermittent suction pending evaluation by surgical service.    Patient was endorsing considerable abdominal pain in the emergency department.  This was coupled with an uptrending lactic acid, initially 1.6 having up trended to 2.4 at the time of admission.  Patient was also requiring increasingly higher doses of Dilaudid to address acute abdominal pain.  Repeat lactic acid levels drawn now demonstrated downtrending however minimal.  On clinical exam, he has no pain to palpation.  No peritoneal signs.  The abdomen remains markedly distended however surprisingly nontender.  Clinically, findings inconsistent with acute abdomen or ischemic bowel.  We will obtain a repeat abdominal x-ray in a.m. to further assess.  Every 4 hours abdominal exams to be obtained pending evaluation by surgical service.    Will continue pain control with IV Dilaudid at this time.  However low  threshold for de-escalating intensity of therapy and transitioning to alternative agents as indicated.    Home medications to be reviewed and resumed as indicated    Maintain telemetry    PT/OT evaluation     I spent >75 minutes in the professional and overall care of this patient.      Wilfred Garcias, DO

## 2024-01-21 ENCOUNTER — APPOINTMENT (OUTPATIENT)
Dept: RADIOLOGY | Facility: HOSPITAL | Age: 74
DRG: 389 | End: 2024-01-21
Payer: MEDICARE

## 2024-01-21 LAB
ALBUMIN SERPL BCP-MCNC: 3.7 G/DL (ref 3.4–5)
ALP SERPL-CCNC: 57 U/L (ref 33–136)
ALT SERPL W P-5'-P-CCNC: 18 U/L (ref 10–52)
ANION GAP SERPL CALC-SCNC: 12 MMOL/L (ref 10–20)
AST SERPL W P-5'-P-CCNC: 18 U/L (ref 9–39)
BILIRUB SERPL-MCNC: 0.7 MG/DL (ref 0–1.2)
BUN SERPL-MCNC: 17 MG/DL (ref 6–23)
CALCIUM SERPL-MCNC: 8.4 MG/DL (ref 8.6–10.3)
CHLORIDE SERPL-SCNC: 106 MMOL/L (ref 98–107)
CO2 SERPL-SCNC: 25 MMOL/L (ref 21–32)
CREAT SERPL-MCNC: 1.1 MG/DL (ref 0.5–1.3)
EGFRCR SERPLBLD CKD-EPI 2021: 71 ML/MIN/1.73M*2
ERYTHROCYTE [DISTWIDTH] IN BLOOD BY AUTOMATED COUNT: 13.3 % (ref 11.5–14.5)
GLUCOSE SERPL-MCNC: 97 MG/DL (ref 74–99)
HCT VFR BLD AUTO: 44.6 % (ref 41–52)
HGB BLD-MCNC: 14.5 G/DL (ref 13.5–17.5)
MAGNESIUM SERPL-MCNC: 2.39 MG/DL (ref 1.6–2.4)
MCH RBC QN AUTO: 31.8 PG (ref 26–34)
MCHC RBC AUTO-ENTMCNC: 32.5 G/DL (ref 32–36)
MCV RBC AUTO: 98 FL (ref 80–100)
NRBC BLD-RTO: 0 /100 WBCS (ref 0–0)
PHOSPHATE SERPL-MCNC: 2.7 MG/DL (ref 2.5–4.9)
PLATELET # BLD AUTO: 126 X10*3/UL (ref 150–450)
POTASSIUM SERPL-SCNC: 3.9 MMOL/L (ref 3.5–5.3)
PROT SERPL-MCNC: 5.8 G/DL (ref 6.4–8.2)
RBC # BLD AUTO: 4.56 X10*6/UL (ref 4.5–5.9)
SODIUM SERPL-SCNC: 139 MMOL/L (ref 136–145)
WBC # BLD AUTO: 6.9 X10*3/UL (ref 4.4–11.3)

## 2024-01-21 PROCEDURE — 85027 COMPLETE CBC AUTOMATED: CPT | Performed by: NURSE PRACTITIONER

## 2024-01-21 PROCEDURE — 80053 COMPREHEN METABOLIC PANEL: CPT | Performed by: NURSE PRACTITIONER

## 2024-01-21 PROCEDURE — C9113 INJ PANTOPRAZOLE SODIUM, VIA: HCPCS | Performed by: INTERNAL MEDICINE

## 2024-01-21 PROCEDURE — 1100000001 HC PRIVATE ROOM DAILY

## 2024-01-21 PROCEDURE — 99233 SBSQ HOSP IP/OBS HIGH 50: CPT | Performed by: NURSE PRACTITIONER

## 2024-01-21 PROCEDURE — 99232 SBSQ HOSP IP/OBS MODERATE 35: CPT | Performed by: SURGERY

## 2024-01-21 PROCEDURE — 36415 COLL VENOUS BLD VENIPUNCTURE: CPT | Performed by: NURSE PRACTITIONER

## 2024-01-21 PROCEDURE — 2500000004 HC RX 250 GENERAL PHARMACY W/ HCPCS (ALT 636 FOR OP/ED): Performed by: INTERNAL MEDICINE

## 2024-01-21 PROCEDURE — 84100 ASSAY OF PHOSPHORUS: CPT | Performed by: NURSE PRACTITIONER

## 2024-01-21 PROCEDURE — 74018 RADEX ABDOMEN 1 VIEW: CPT

## 2024-01-21 PROCEDURE — 83735 ASSAY OF MAGNESIUM: CPT | Performed by: NURSE PRACTITIONER

## 2024-01-21 RX ADMIN — PANTOPRAZOLE SODIUM 40 MG: 40 INJECTION, POWDER, FOR SOLUTION INTRAVENOUS at 06:34

## 2024-01-21 RX ADMIN — HYDROMORPHONE HYDROCHLORIDE 0.4 MG: 1 INJECTION, SOLUTION INTRAMUSCULAR; INTRAVENOUS; SUBCUTANEOUS at 08:39

## 2024-01-21 RX ADMIN — ENOXAPARIN SODIUM 40 MG: 40 INJECTION SUBCUTANEOUS at 22:02

## 2024-01-21 RX ADMIN — ONDANSETRON 4 MG: 2 INJECTION INTRAMUSCULAR; INTRAVENOUS at 08:40

## 2024-01-21 ASSESSMENT — COGNITIVE AND FUNCTIONAL STATUS - GENERAL
DAILY ACTIVITIY SCORE: 24
MOBILITY SCORE: 24

## 2024-01-21 ASSESSMENT — PAIN SCALES - GENERAL
PAINLEVEL_OUTOF10: 5 - MODERATE PAIN
PAINLEVEL_OUTOF10: 0 - NO PAIN
PAINLEVEL_OUTOF10: 2
PAINLEVEL_OUTOF10: 0 - NO PAIN

## 2024-01-21 ASSESSMENT — PAIN SCALES - PAIN ASSESSMENT IN ADVANCED DEMENTIA (PAINAD)
FACIALEXPRESSION: SAD, FRIGHTENED, FROWN
BREATHING: NORMAL
NEGVOCALIZATION: OCCASIONAL MOAN/GROAN, LOW SPEECH, NEGATIVE/DISAPPROVING QUALITY
CONSOLABILITY: NO NEED TO CONSOLE

## 2024-01-21 ASSESSMENT — PAIN - FUNCTIONAL ASSESSMENT: PAIN_FUNCTIONAL_ASSESSMENT: 0-10

## 2024-01-21 NOTE — PROGRESS NOTES
Jovanny Yap is a 73 y.o. male on day 2 of admission presenting with Abdominal pain, unspecified abdominal location.    Subjective   Patient seen and examined  Had large bowel movement yesterday and reports continued passing flatulence since yesterday  Has decreased abdominal pain and did not require any pain medications for about 10 hours  No longer nauseated, no emesis      Objective     Physical Exam  Constitutional:       Appearance: Normal appearance.   HENT:      Head: Normocephalic and atraumatic.      Mouth: Mucous membranes are moist.   Eyes:      Extraocular Movements: Extraocular movements intact.      Conjunctiva/sclera: Conjunctivae normal.      Pupils: Pupils are equal, round, and reactive to light.   Cardiovascular:      Rate and Rhythm: Normal rate and regular rhythm.      Pulses: Normal pulses.      Heart sounds: Normal heart sounds.   Pulmonary:      Effort: Pulmonary effort is normal.      Breath sounds: Normal breath sounds.   Abdominal:      General: Bowel sounds are normal. There is distension     Palpations: Abdomen is soft. BS present     Tenderness: There is no abdominal tenderness. There is no guarding or rebound.   Musculoskeletal:         General: Normal range of motion.      Cervical back: Normal range of motion and neck supple.   Skin:     General: Skin is warm and dry.   Neurological:      General: No focal deficit present.      Mental Status: He is alert. Mental status is at baseline.   Psychiatric:         Mood and Affect: Mood normal.         Behavior: Behavior normal.      Last Recorded Vitals  Blood pressure 150/82, pulse 64, temperature 36.1 °C (97 °F), temperature source Temporal, resp. rate 16, height 1.829 m (6'), weight 109 kg (240 lb), SpO2 97 %.  Intake/Output last 3 Shifts:  I/O last 3 completed shifts:  In: 2037.5 (18.7 mL/kg) [I.V.:1037.5 (9.5 mL/kg); IV Piggyback:1000]  Out: 1550 (14.2 mL/kg) [Urine:750 (0.2 mL/kg/hr); Emesis/NG output:500; Other:300]  Weight: 108.9  kg     Relevant Results  Scheduled medications  enoxaparin, 40 mg, subcutaneous, q24h  pantoprazole, 40 mg, oral, Daily before breakfast   Or  pantoprazole, 40 mg, intravenous, Daily before breakfast      Continuous medications     PRN medications  PRN medications: acetaminophen **OR** acetaminophen **OR** acetaminophen, acetaminophen **OR** acetaminophen **OR** acetaminophen, HYDROmorphone, HYDROmorphone, melatonin, metoclopramide **OR** metoclopramide, ondansetron ODT **OR** ondansetron, polyethylene glycol  Results from last 7 days   Lab Units 01/21/24  0528 01/20/24  0545 01/19/24  1735   WBC AUTO x10*3/uL 6.9 12.5* 9.3   RBC AUTO x10*6/uL 4.56 4.90 5.18   HEMOGLOBIN g/dL 14.5 15.8 16.6     Results from last 7 days   Lab Units 01/21/24  0528 01/20/24  0950 01/20/24  0545 01/19/24  1735   SODIUM mmol/L 139  --  137 137   POTASSIUM mmol/L 3.9  --  4.2 4.1   CHLORIDE mmol/L 106  --  103 101   CO2 mmol/L 25  --  23 26   BUN mg/dL 17  --  18 17   CREATININE mg/dL 1.10  --  1.06 1.15   CALCIUM mg/dL 8.4*  --  8.8 9.3   PHOSPHORUS mg/dL 2.7  --   --   --    MAGNESIUM mg/dL 2.39 2.24  --  2.29   BILIRUBIN TOTAL mg/dL 0.7  --  0.7 0.6   ALT U/L 18  --  22 27   AST U/L 18  --  17 20       XR abdomen 1 view    Result Date: 1/21/2024  Interpreted By:  Ermias Henriquez, STUDY: XR ABDOMEN 1 VIEW; 1/21/2024 8:28 am   INDICATION: Signs/Symptoms:sbo.   COMPARISON: 01/20/2024   ACCESSION NUMBER(S): [id number]   ORDERING CLINICIAN: FOREIGN CAMERON   FINDINGS: 4 supine AP radiographs of the abdomen were obtained. An enteric tube is present, with the tip overlying the mid stomach. There is a nonobstructive bowel gas pattern present. Free intraperitoneal air and air-fluid levels cannot be excluded without upright or decubitus images.       Nonobstructive bowel gas pattern.   MACRO: None   Signed by: Ermias Henriquez 1/21/2024 9:09 AM Dictation workstation:   [id number]    XR abdomen 1 view    Result Date: 1/20/2024  Interpreted By:   Mainor Lindsey, STUDY: XR ABDOMEN 1 VIEW;  1/20/2024 11:31 am   INDICATION: Signs/Symptoms:SBO - INTERVAL MONITORING.   COMPARISON: 01/19/2024   ACCESSION NUMBER(S): [id number]   ORDERING CLINICIAN: ALEX RUBIN   FINDINGS: Limited evaluation of pneumoperitoneum on supine imaging. Air-filled only minimally dilated loops of small and large bowel in the left aspect of the abdomen. NG tube terminates in the left upper quadrant likely within the gastric body.       1.  NG tube as described. Air-filled only minimally dilated loops of small and large bowel in the left aspect of the abdomen. Consider focal ileus or mild partial small bowel obstruction   MACRO: None   Signed by: Mainor Lindsey 1/20/2024 1:31 PM Dictation workstation:   [id number]    XR abdomen 1 view    Result Date: 1/19/2024  INDICATION: NG Tube placement. COMPARISON: 10/24/2023 . TECHNIQUE:  AP supine view(s) of the abdomen. FINDINGS:  The right half of the abdomen was not imaged. An NG tube is present with the tip in a region which would correspond to the body of the stomach.  The side-port is in a region which would correspond to being at the gastroesophageal junction. Motion artifact is present.  There appears to be an air-filled loop of small bowel in the mid abdomen.  Free air is not ruled out on this study.      NG tube. Signed by Jhonny Bray MD    CT abdomen pelvis w IV contrast    Result Date: 1/19/2024  STUDY: CT Abdomen and Pelvis with IV Contrast; 01/19/2024 7:24 pm INDICATION: Abdominal pain.  History of small bowel obstruction. COMPARISON: CT A/P 11/14/2023 and 10/22/2023. ACCESSION NUMBER(S): [id number] ORDERING CLINICIAN: KRYSTA REYNOLDS TECHNIQUE: CT of the abdomen and pelvis was performed.  Contiguous axial images were obtained at 3 mm slice thickness through the abdomen and pelvis. Coronal and sagittal reconstructions at 3 mm slice thickness were performed.  Omnipaque 350, 75 mL was administered intravenously.  FINDINGS: LOWER  CHEST: No cardiomegaly.  No pericardial effusion.  Lung bases are clear.  ABDOMEN:  LIVER: No hepatomegaly.  Diffuse decreased attenuation throughout the liver. No focal lesions.  BILE DUCTS: No biliary ductal dilatation.  GALLBLADDER: The gallbladder is unremarkable in CT appearance. STOMACH: Moderate fluid and food in the stomach.  PANCREAS: No masses or ductal dilatation.  SPLEEN: No splenomegaly or focal splenic lesion.  ADRENAL GLANDS: No thickening or nodules.  KIDNEYS AND URETERS: Kidneys are normal in size and location.  Numerous large bilateral renal cysts.  No renal calculi.  No hydronephrosis.  PELVIS:  BLADDER: No abnormalities identified.  REPRODUCTIVE ORGANS: No abnormalities identified.  BOWEL: Abnormal bowel gas pattern.  Multiple mildly prominent fluid-filled loops of small bowel present measuring up to 3.5 cm in diameter.  No transition.  Prior right hemicolectomy with ileocolic anastomosis in the right hemiabdomen.  Normal amount stool in the colon.  Moderate colonic diverticulosis.  VESSELS: No abnormalities identified.  Mild atherosclerotic vascular calcifications.  PERITONEUM/RETROPERITONEUM/LYMPH NODES: No free fluid.  No pneumoperitoneum. No lymphadenopathy.  ABDOMINAL WALL: Again demonstrated is diastases of the anterior abdominal wall in the midline.  This is associated small hernia in the right lower quadrant which contains an otherwise normal looking loop of small bowel.  There is also a small hernia in the left lower quadrant.  Previously this only contains fat but currently a portion of the colon partially extends into this hernia without evidence of obstruction or inflammation. Small fat-containing left inguinal hernia. SOFT TISSUES: No abnormalities identified.  BONES: No acute fracture or aggressive osseous lesion.    Abnormal bowel gas pattern with numerous prominent fluid-filled loops of small bowel, similar to the 11/14/2023 examination.  I cannot exclude a partial small bowel  obstruction but the appearance is more compatible with ileus or enteritis.  The patient has had a prior right hemicolectomy with an intact ileocolic anastomosis in the right abdomen.  Moderate colonic diverticulosis.  No focal inflammatory changes.  No evidence of free fluid or free air. Fatty infiltration of the liver.  The gallbladder is unremarkable in CT appearance.  No evidence of biliary ductal dilatation. Numerous large bilateral renal cysts.  No renal calculi.  No hydronephrosis. Signed by Bob Hansen MD       Assessment/Plan   Principal Problem:    Abdominal pain, unspecified abdominal location  Active Problems:    Anxiety disorder    GERD (gastroesophageal reflux disease)    Hyperlipidemia    Small bowel obstruction (CMS/HCC)    Generalized abdominal pain    Lactic acidosis    Plan:    Overall improvement in pain, had large bowel movement and passing flatulence  General surgery on consult: discontinued NG tube today  Started on clears  Advance per general surgery in am if tolerates  Discontinue IV fluids  Lactic acidosis resolved  Cont pain management with dilaudid today and overnight  KUB ordered this am showing non obstructive  bowel gas pattern  Am including cbc, cmp, mag  POC discussed with patient and attending at bedside  Dispo: per gen surg, likely discharge home tomorrow, gen surg to re evaluate in am  I spent >35 minutes in the professional and overall care of this patient.      Dorys Arteaga, APRSALAZARCleveland Clinic  [address] [address]  Phone: [phone] Fax: [phone]

## 2024-01-21 NOTE — PROGRESS NOTES
Jovanny Yap is a 73 y.o. male on day 2 of admission presenting with Abdominal pain, unspecified abdominal location.    Subjective   Patient without complaints.  (-) nausea  (+) flatus  (+) BM  He denies pain.       Objective     Physical Exam  General: NAD  Neuro: A + O x 3  Abdomen: soft, obese, non-tender, (+) distension, (+) tympany, (-) guarding, (-) rebound  Extremities: (-) edema    Last Recorded Vitals  Blood pressure 150/82, pulse 64, temperature 36.1 °C (97 °F), temperature source Temporal, resp. rate 16, height 1.829 m (6'), weight 109 kg (240 lb), SpO2 97 %.  Intake/Output last 3 Shifts:  I/O last 3 completed shifts:  In: 2037.5 (18.7 mL/kg) [I.V.:1037.5 (9.5 mL/kg); IV Piggyback:1000]  Out: 1550 (14.2 mL/kg) [Urine:750 (0.2 mL/kg/hr); Emesis/NG output:500; Other:300]  Weight: 108.9 kg     Relevant Results              Results for orders placed or performed during the hospital encounter of 01/19/24 (from the past 24 hour(s))   CBC   Result Value Ref Range    WBC 6.9 4.4 - 11.3 x10*3/uL    nRBC 0.0 0.0 - 0.0 /100 WBCs    RBC 4.56 4.50 - 5.90 x10*6/uL    Hemoglobin 14.5 13.5 - 17.5 g/dL    Hematocrit 44.6 41.0 - 52.0 %    MCV 98 80 - 100 fL    MCH 31.8 26.0 - 34.0 pg    MCHC 32.5 32.0 - 36.0 g/dL    RDW 13.3 11.5 - 14.5 %    Platelets 126 (L) 150 - 450 x10*3/uL   Comprehensive Metabolic Panel   Result Value Ref Range    Glucose 97 74 - 99 mg/dL    Sodium 139 136 - 145 mmol/L    Potassium 3.9 3.5 - 5.3 mmol/L    Chloride 106 98 - 107 mmol/L    Bicarbonate 25 21 - 32 mmol/L    Anion Gap 12 10 - 20 mmol/L    Urea Nitrogen 17 6 - 23 mg/dL    Creatinine 1.10 0.50 - 1.30 mg/dL    eGFR 71 >60 mL/min/1.73m*2    Calcium 8.4 (L) 8.6 - 10.3 mg/dL    Albumin 3.7 3.4 - 5.0 g/dL    Alkaline Phosphatase 57 33 - 136 U/L    Total Protein 5.8 (L) 6.4 - 8.2 g/dL    AST 18 9 - 39 U/L    Bilirubin, Total 0.7 0.0 - 1.2 mg/dL    ALT 18 10 - 52 U/L   Magnesium   Result Value Ref Range    Magnesium 2.39 1.60 - 2.40 mg/dL    Phosphorus   Result Value Ref Range    Phosphorus 2.7 2.5 - 4.9 mg/dL                     Assessment/Plan   Principal Problem:    Abdominal pain, unspecified abdominal location  Active Problems:    Anxiety disorder    GERD (gastroesophageal reflux disease)    Hyperlipidemia    Small bowel obstruction (CMS/HCC)    Generalized abdominal pain    Lactic acidosis    Discontinue NGT.  Start full liquids.  Anticipate discharge tomorrow if doing well.       I spent 30 minutes in the professional and overall care of this patient.      Misael Dee MD

## 2024-01-21 NOTE — CARE PLAN
The patient's goals for the shift include      The clinical goals for the shift include pt. will have less pain and nausea by end of shift    Medicated this morning for abd pain and nausea just prior to when NG tube was removed. Has not had any further complaints since that time. Had very large formed brown BM after taking clear liquids at lunchtime. Pt aware that he may order from full liquid menu but wants to take it easy.

## 2024-01-21 NOTE — CARE PLAN
The patient's goals for the shift include      The clinical goals for the shift include pt. will have less pain and nausea by end of shift    Over the shift, the patient did make progress toward the following goals of less pain and nausea.

## 2024-01-22 VITALS
DIASTOLIC BLOOD PRESSURE: 81 MMHG | WEIGHT: 240 LBS | OXYGEN SATURATION: 96 % | HEART RATE: 59 BPM | TEMPERATURE: 97.7 F | HEIGHT: 72 IN | SYSTOLIC BLOOD PRESSURE: 161 MMHG | RESPIRATION RATE: 17 BRPM | BODY MASS INDEX: 32.51 KG/M2

## 2024-01-22 LAB
ALBUMIN SERPL BCP-MCNC: 3.6 G/DL (ref 3.4–5)
ALP SERPL-CCNC: 52 U/L (ref 33–136)
ALT SERPL W P-5'-P-CCNC: 20 U/L (ref 10–52)
ANION GAP SERPL CALC-SCNC: 11 MMOL/L (ref 10–20)
AST SERPL W P-5'-P-CCNC: 20 U/L (ref 9–39)
BILIRUB SERPL-MCNC: 0.7 MG/DL (ref 0–1.2)
BUN SERPL-MCNC: 15 MG/DL (ref 6–23)
CALCIUM SERPL-MCNC: 8.5 MG/DL (ref 8.6–10.3)
CHLORIDE SERPL-SCNC: 104 MMOL/L (ref 98–107)
CO2 SERPL-SCNC: 29 MMOL/L (ref 21–32)
CREAT SERPL-MCNC: 1.21 MG/DL (ref 0.5–1.3)
EGFRCR SERPLBLD CKD-EPI 2021: 63 ML/MIN/1.73M*2
ERYTHROCYTE [DISTWIDTH] IN BLOOD BY AUTOMATED COUNT: 12.9 % (ref 11.5–14.5)
GLUCOSE SERPL-MCNC: 93 MG/DL (ref 74–99)
HCT VFR BLD AUTO: 41.9 % (ref 41–52)
HGB BLD-MCNC: 14.1 G/DL (ref 13.5–17.5)
MAGNESIUM SERPL-MCNC: 2.32 MG/DL (ref 1.6–2.4)
MCH RBC QN AUTO: 32.2 PG (ref 26–34)
MCHC RBC AUTO-ENTMCNC: 33.7 G/DL (ref 32–36)
MCV RBC AUTO: 96 FL (ref 80–100)
NRBC BLD-RTO: 0 /100 WBCS (ref 0–0)
PLATELET # BLD AUTO: 119 X10*3/UL (ref 150–450)
POTASSIUM SERPL-SCNC: 4.2 MMOL/L (ref 3.5–5.3)
PROT SERPL-MCNC: 5.9 G/DL (ref 6.4–8.2)
RBC # BLD AUTO: 4.38 X10*6/UL (ref 4.5–5.9)
SODIUM SERPL-SCNC: 140 MMOL/L (ref 136–145)
WBC # BLD AUTO: 5.4 X10*3/UL (ref 4.4–11.3)

## 2024-01-22 PROCEDURE — 84075 ASSAY ALKALINE PHOSPHATASE: CPT | Performed by: NURSE PRACTITIONER

## 2024-01-22 PROCEDURE — 99232 SBSQ HOSP IP/OBS MODERATE 35: CPT | Performed by: SURGERY

## 2024-01-22 PROCEDURE — 2500000004 HC RX 250 GENERAL PHARMACY W/ HCPCS (ALT 636 FOR OP/ED): Performed by: INTERNAL MEDICINE

## 2024-01-22 PROCEDURE — 99239 HOSP IP/OBS DSCHRG MGMT >30: CPT

## 2024-01-22 PROCEDURE — 85027 COMPLETE CBC AUTOMATED: CPT | Performed by: NURSE PRACTITIONER

## 2024-01-22 PROCEDURE — 83735 ASSAY OF MAGNESIUM: CPT | Performed by: NURSE PRACTITIONER

## 2024-01-22 PROCEDURE — 36415 COLL VENOUS BLD VENIPUNCTURE: CPT | Performed by: NURSE PRACTITIONER

## 2024-01-22 RX ADMIN — PANTOPRAZOLE SODIUM 40 MG: 40 TABLET, DELAYED RELEASE ORAL at 06:08

## 2024-01-22 ASSESSMENT — PAIN SCALES - GENERAL: PAINLEVEL_OUTOF10: 0 - NO PAIN

## 2024-01-22 NOTE — PROGRESS NOTES
Jovanny SORIA Fracisco  [id number]   1950       Subjective/      Seen in follow-up of partial small bowel obstruction    Patient reports a bowel movement and passing flatus.  Patient denies any cramps or pain.  He says his neck feels much less distended    Tolerating full liquids                  Heart Rate:  [56-62]   Temp:  [36.3 °C (97.3 °F)-36.5 °C (97.7 °F)]   Resp:  [16-18]   BP: (123-176)/(61-93)   SpO2:  [94 %-98 %]     Intake/Output Summary (Last 24 hours) at 1/22/2024 1006  Last data filed at 1/22/2024 0600  Gross per 24 hour   Intake 720 ml   Output --   Net 720 ml          Physical Exam  Abdominal:      Comments: Soft and nontender                Results for orders placed or performed during the hospital encounter of 01/19/24 (from the past 24 hour(s))   CBC   Result Value Ref Range    WBC 5.4 4.4 - 11.3 x10*3/uL    nRBC 0.0 0.0 - 0.0 /100 WBCs    RBC 4.38 (L) 4.50 - 5.90 x10*6/uL    Hemoglobin 14.1 13.5 - 17.5 g/dL    Hematocrit 41.9 41.0 - 52.0 %    MCV 96 80 - 100 fL    MCH 32.2 26.0 - 34.0 pg    MCHC 33.7 32.0 - 36.0 g/dL    RDW 12.9 11.5 - 14.5 %    Platelets 119 (L) 150 - 450 x10*3/uL   Comprehensive Metabolic Panel   Result Value Ref Range    Glucose 93 74 - 99 mg/dL    Sodium 140 136 - 145 mmol/L    Potassium 4.2 3.5 - 5.3 mmol/L    Chloride 104 98 - 107 mmol/L    Bicarbonate 29 21 - 32 mmol/L    Anion Gap 11 10 - 20 mmol/L    Urea Nitrogen 15 6 - 23 mg/dL    Creatinine 1.21 0.50 - 1.30 mg/dL    eGFR 63 >60 mL/min/1.73m*2    Calcium 8.5 (L) 8.6 - 10.3 mg/dL    Albumin 3.6 3.4 - 5.0 g/dL    Alkaline Phosphatase 52 33 - 136 U/L    Total Protein 5.9 (L) 6.4 - 8.2 g/dL    AST 20 9 - 39 U/L    Bilirubin, Total 0.7 0.0 - 1.2 mg/dL    ALT 20 10 - 52 U/L   Magnesium   Result Value Ref Range    Magnesium 2.32 1.60 - 2.40 mg/dL        I reviewed the above studies      ECG 12 lead    Result Date: 1/22/2024  Demand pacemaker, interpretation is based on intrinsic rhythm Sinus rhythm and Premature ventricular  complexes or Fusion complexes Otherwise normal ECG When compared with ECG of 14-NOV-2023 11:57, Electronic demand pacing is now Present Fusion complexes are now Present Premature ventricular complexes are now Present    XR abdomen 1 view    Result Date: 1/21/2024  Interpreted By:  Ermias Henriquez, STUDY: XR ABDOMEN 1 VIEW; 1/21/2024 8:28 am   INDICATION: Signs/Symptoms:sbo.   COMPARISON: 01/20/2024   ACCESSION NUMBER(S): [id number]   ORDERING CLINICIAN: FOREIGN CAMERON   FINDINGS: 4 supine AP radiographs of the abdomen were obtained. An enteric tube is present, with the tip overlying the mid stomach. There is a nonobstructive bowel gas pattern present. Free intraperitoneal air and air-fluid levels cannot be excluded without upright or decubitus images.       Nonobstructive bowel gas pattern.   MACRO: None   Signed by: Ermias Henriquez 1/21/2024 9:09 AM Dictation workstation:   [id number]    XR abdomen 1 view    Result Date: 1/20/2024  Interpreted By:  Mainor Lindsey, STUDY: XR ABDOMEN 1 VIEW;  1/20/2024 11:31 am   INDICATION: Signs/Symptoms:SBO - INTERVAL MONITORING.   COMPARISON: 01/19/2024   ACCESSION NUMBER(S): [id number]   ORDERING CLINICIAN: ALEX RUBIN   FINDINGS: Limited evaluation of pneumoperitoneum on supine imaging. Air-filled only minimally dilated loops of small and large bowel in the left aspect of the abdomen. NG tube terminates in the left upper quadrant likely within the gastric body.       1.  NG tube as described. Air-filled only minimally dilated loops of small and large bowel in the left aspect of the abdomen. Consider focal ileus or mild partial small bowel obstruction   MACRO: None   Signed by: Mainor Lindsey 1/20/2024 1:31 PM Dictation workstation:   [id number]    XR abdomen 1 view    Result Date: 1/19/2024  INDICATION: NG Tube placement. COMPARISON: 10/24/2023 . TECHNIQUE:  AP supine view(s) of the abdomen. FINDINGS:  The right half of the abdomen was not imaged. An NG tube is present with  the tip in a region which would correspond to the body of the stomach.  The side-port is in a region which would correspond to being at the gastroesophageal junction. Motion artifact is present.  There appears to be an air-filled loop of small bowel in the mid abdomen.  Free air is not ruled out on this study.      NG tube. Signed by Jhonny Bray MD    CT abdomen pelvis w IV contrast    Result Date: 1/19/2024  STUDY: CT Abdomen and Pelvis with IV Contrast; 01/19/2024 7:24 pm INDICATION: Abdominal pain.  History of small bowel obstruction. COMPARISON: CT A/P 11/14/2023 and 10/22/2023. ACCESSION NUMBER(S): [id number] ORDERING CLINICIAN: KRYSTA REYNOLDS TECHNIQUE: CT of the abdomen and pelvis was performed.  Contiguous axial images were obtained at 3 mm slice thickness through the abdomen and pelvis. Coronal and sagittal reconstructions at 3 mm slice thickness were performed.  Omnipaque 350, 75 mL was administered intravenously.  FINDINGS: LOWER CHEST: No cardiomegaly.  No pericardial effusion.  Lung bases are clear.  ABDOMEN:  LIVER: No hepatomegaly.  Diffuse decreased attenuation throughout the liver. No focal lesions.  BILE DUCTS: No biliary ductal dilatation.  GALLBLADDER: The gallbladder is unremarkable in CT appearance. STOMACH: Moderate fluid and food in the stomach.  PANCREAS: No masses or ductal dilatation.  SPLEEN: No splenomegaly or focal splenic lesion.  ADRENAL GLANDS: No thickening or nodules.  KIDNEYS AND URETERS: Kidneys are normal in size and location.  Numerous large bilateral renal cysts.  No renal calculi.  No hydronephrosis.  PELVIS:  BLADDER: No abnormalities identified.  REPRODUCTIVE ORGANS: No abnormalities identified.  BOWEL: Abnormal bowel gas pattern.  Multiple mildly prominent fluid-filled loops of small bowel present measuring up to 3.5 cm in diameter.  No transition.  Prior right hemicolectomy with ileocolic anastomosis in the right hemiabdomen.  Normal amount stool in the colon.   Moderate colonic diverticulosis.  VESSELS: No abnormalities identified.  Mild atherosclerotic vascular calcifications.  PERITONEUM/RETROPERITONEUM/LYMPH NODES: No free fluid.  No pneumoperitoneum. No lymphadenopathy.  ABDOMINAL WALL: Again demonstrated is diastases of the anterior abdominal wall in the midline.  This is associated small hernia in the right lower quadrant which contains an otherwise normal looking loop of small bowel.  There is also a small hernia in the left lower quadrant.  Previously this only contains fat but currently a portion of the colon partially extends into this hernia without evidence of obstruction or inflammation. Small fat-containing left inguinal hernia. SOFT TISSUES: No abnormalities identified.  BONES: No acute fracture or aggressive osseous lesion.    Abnormal bowel gas pattern with numerous prominent fluid-filled loops of small bowel, similar to the 11/14/2023 examination.  I cannot exclude a partial small bowel obstruction but the appearance is more compatible with ileus or enteritis.  The patient has had a prior right hemicolectomy with an intact ileocolic anastomosis in the right abdomen.  Moderate colonic diverticulosis.  No focal inflammatory changes.  No evidence of free fluid or free air. Fatty infiltration of the liver.  The gallbladder is unremarkable in CT appearance.  No evidence of biliary ductal dilatation. Numerous large bilateral renal cysts.  No renal calculi.  No hydronephrosis. Signed by Bob Hansen MD    Colonoscopy Screening    Result Date: 1/10/2024  Table formatting from the original result was not included. Impression Subcentimeter polyp in the rectum was removed with cold forceps biopsy The terminal ileum and entire colon appeared normal. Diverticulosis in the descending colon Findings 2 mm sessile polyp in the rectum; performed cold forceps biopsy All observed locations appeared normal, including the terminal ileum and entire colon. Including  anastomosis; Few small diverticula in the descending colon  Recommendation  Await pathology results  Follow up with PCP  Repeat colonoscopy in 3 years Indication Colon cancer screening Staff Staff Role No Staff Documented Medications See Anesthesia Record. Preprocedure A history and physical has been performed, and patient medication allergies have been reviewed. The patient's tolerance of previous anesthesia has been reviewed. The risks and benefits of the procedure and the sedation options and risks were discussed with the patient. All questions were answered and informed consent obtained. Details of the Procedure The patient underwent monitored anesthesia care, which was administered by an anesthesia professional. The patient's blood pressure, ECG, ETCO2, heart rate, level of consciousness, oxygen and respirations were monitored throughout the procedure. A digital rectal exam was performed. The scope was introduced through the anus and advanced to the terminal ileum. Retroflexion was performed in the rectum. The quality of bowel preparation was evaluated using the [place] Bowel Preparation Scale with scores of: right colon = 2, transverse colon = 2, left colon = 2. The total BBPS score was 6. Bowel prep was adequate. The patient experienced no blood loss. The procedure was not difficult. The patient tolerated the procedure well. There were no apparent adverse events. Events Procedure Events Event Event Time ENDO SCOPE IN TIME 1/10/2024 10:41 AM ENDO SCOPE IN TIME 1/10/2024 10:41 AM ENDO CECUM REACHED 1/10/2024 10:45 AM ENDO SCOPE OUT TIME 1/10/2024 10:53 AM Specimens ID Type Source Tests Collected by Time A :  Tissue RECTUM POLYPECTOMY TISSUE/WOUND CULTURE/SMEAR Mati Fernandez MD 1/10/2024 1051 Procedure Location [facility] [place] [address] [facility]  [facility] [id number] [phone] Referring Provider Jhonny Cuevas,  [address] [facility] [place], [place] [place]  [address] Procedure Provider Mati Fernandez MD        I have reviewed the images and the reports        Assessment/    Episode of partial small bowel obstruction, resolving      Plan/    May discharge to home      Rigoberto Kemp MD

## 2024-01-22 NOTE — CARE PLAN
The patient's goals for the shift include      The clinical goals for the shift include pt. will continue to tolerate full liquid diet without nausea or abdominal pain    Over the shift, the patient did make progress toward continuing to tolerate a full liquid diet without nausea or abdominal pain.

## 2024-01-22 NOTE — DISCHARGE SUMMARY
Discharge Diagnosis  Abdominal pain, unspecified abdominal location    Issues Requiring Follow-Up  Follow up with main [place] surgeon for repair of hernia, Ruben Lopez  Continue to advance diet as tolerated    Discharge Meds     Your medication list        ASK your doctor about these medications        Instructions Last Dose Given Next Dose Due   aspirin 81 mg EC tablet           cyanocobalamin 1,000 mcg tablet  Commonly known as: Vitamin B-12           escitalopram 5 mg tablet  Commonly known as: Lexapro      Take 1 tablet (5 mg) by mouth once daily.       pantoprazole 40 mg EC tablet  Commonly known as: ProtoNix      Take 1 tablet (40 mg) by mouth once daily.       simvastatin 20 mg tablet  Commonly known as: Zocor      TAKE 1 TABLET DAILY IN THE EVENING       tamsulosin 0.4 mg 24 hr capsule  Commonly known as: Flomax      Take 1 capsule (0.4 mg) by mouth once daily.                Test Results Pending At Discharge  Pending Labs       No current pending labs.            Hospital Course   A 73 year old male DVT (on xarelto), history of recurrent obstructions (likely secondary to adhesions, over the past year), a history of multiple prior abdominal surgeries, HLD, anxiety and GERD who presented to [facility] ED fo rabdominal pain and nausea x 2 days.  He reports requiring hospitalization three times for this, but all were treated non-operatively.  He states that this episode feels like the past episodes.  The pain is located in the b/l lower quadrants.  It is described as a cramping sensation which is constant, severe, and non-radiating.  In the ED, vitally stable and cbc/cmp unremarkable. CT abdomen and pelvis revealed multiple dilated loops of small bowel however these findings are somewhat similar to studies obtained in November 2023.  Clinically however, patient's symptoms consistent with obstruction.  Has endorsed significant relief following NG placement which remains set to low intermittent suction with at least  400 cc of bilious output.  Of note, the patient has a known ventral hernia which has been evaluated by Dr. Kemp in the office.  The patient states that he was referred to a Dr. Espino, who he has not yet seen, for consultation for repair.  General Surgery was consulted, who monitor patient symptoms while in the hospital. Patient on 1/21/22 did have a large bowel movement and started passing flatulence. Patient NG tube was dc on 1/21/23. Patient was able to tolerate clear liquid diet without any difficulty. Patient will follow outpatient with Dr. Espino, for consultation for repair of hernia. Patient vitally stable on the day of discharge, passing flatulence and tolerating diet.    Pertinent Physical Exam At Time of Discharge  Physical Exam  General: obese, no acute distress  HEENT:  drymucous membranes  CV: regular rate and rhythm, no murmurs, 2+ pulses in all extremities  RESP: CTA bilaterally, normal chest expansion, no resp distress  Abd: soft, nontender, nondistended  Neuro: alert and oriented x3, speech appropriate  Vascular: no peripheral edema appreciated  Skin: no rashes  MSK: no joint swelling    Outpatient Follow-Up  Future Appointments   Date Time Provider Department Center   1/24/2024  1:00 PM Abdiaziz Hood MD [id number] [place]   2/8/2024 12:20 PM Hany Espino MD [id number] [facility]   4/9/2024  1:00 PM Jhonny Cuevas DO [id number] [place]   4/29/2024  1:00 PM ELY [id number] [facility] [place]    5/2/2024  2:30 PM Rosendo Ziegler MD [id number] [place]         Jasmine Kim DO

## 2024-01-22 NOTE — DISCHARGE INSTR - OTHER ORDERS
Call physician right way for:  *Sudden severe belly pain, or the pain is constant  *You have trouble breathing or chest pain along with your belly pain  *You start throwing up blood or pass a lot of blood in your stool  *Your belly becomes very hard or swollen  *You get a fever of 100.4 or higher, shaking chills

## 2024-01-23 ENCOUNTER — PATIENT OUTREACH (OUTPATIENT)
Dept: PRIMARY CARE | Facility: CLINIC | Age: 74
End: 2024-01-23
Payer: MEDICARE

## 2024-01-23 NOTE — PROGRESS NOTES
Discharge Facility:[facility]  Discharge Diagnosis:Abdominal Pain, SBO  Admission Date:1/19/2024  Discharge Date: 1/22/2024    PCP Appointment Date:Declined, will fu with specialists  Specialist Appointment Date: 1/24/2024 Cardio/Sana, 2/8/2024 Surgeon/[facility]arks  Hospital Encounter and Summary: Linked   See discharge assessment below for further details  Engagement  Call Start Time: 1547 (1/23/2024  3:50 PM)    Medications  Medications reviewed with patient/caregiver?: Yes (1/23/2024  3:50 PM)  Is the patient having any side effects they believe may be caused by any medication additions or changes?: No (1/23/2024  3:50 PM)  Does the patient have all medications ordered at discharge?: Not applicable (1/23/2024  3:50 PM)  Care Management Interventions: No intervention needed (1/23/2024  3:50 PM)  Prescription Comments: -- (No Medication changes) (1/23/2024  3:50 PM)  Is the patient taking all medications as directed (includes completed medication regime)?: Yes (1/23/2024  3:50 PM)    Appointments  Does the patient have a primary care provider?: Yes (1/23/2024  3:50 PM)  Care Management Interventions: -- (Declined will fu with specialists) (1/23/2024  3:50 PM)  Has the patient kept scheduled appointments due by today?: Yes (1/23/2024  3:50 PM)    Self Management  What is the home health agency?: -- (N/A) (1/23/2024  3:50 PM)  What Durable Medical Equipment (DME) was ordered?: -- (N/A) (1/23/2024  3:50 PM)    Patient Teaching  Does the patient have access to their discharge instructions?: Yes (1/23/2024  3:50 PM)  What is the patient's perception of their health status since discharge?: Improving (1/23/2024  3:50 PM)  Is the patient/caregiver able to teach back the hierarchy of who to call/visit for symptoms/problems? PCP, Specialist, Home Health nurse, Urgent Care, ED, 911: Yes (1/23/2024  3:50 PM)    Wrap Up  Wrap Up Additional Comments: -- (Jovanny is doing well, no further symptoms at the moment. He will fu with the Surgeon  regarding hernia.) (1/23/2024  3:50 PM)

## 2024-01-24 ENCOUNTER — OFFICE VISIT (OUTPATIENT)
Dept: CARDIOLOGY | Facility: CLINIC | Age: 74
End: 2024-01-24
Payer: MEDICARE

## 2024-01-24 VITALS
BODY MASS INDEX: 34.48 KG/M2 | HEIGHT: 72 IN | SYSTOLIC BLOOD PRESSURE: 156 MMHG | OXYGEN SATURATION: 96 % | WEIGHT: 254.6 LBS | HEART RATE: 63 BPM | DIASTOLIC BLOOD PRESSURE: 91 MMHG

## 2024-01-24 DIAGNOSIS — R93.1 ELEVATED CORONARY ARTERY CALCIUM SCORE: ICD-10-CM

## 2024-01-24 DIAGNOSIS — E78.2 MIXED HYPERLIPIDEMIA: Chronic | ICD-10-CM

## 2024-01-24 DIAGNOSIS — I10 BENIGN ESSENTIAL HYPERTENSION: Chronic | ICD-10-CM

## 2024-01-24 DIAGNOSIS — I10 BENIGN ESSENTIAL HYPERTENSION: Primary | Chronic | ICD-10-CM

## 2024-01-24 PROCEDURE — 3080F DIAST BP >= 90 MM HG: CPT | Performed by: INTERNAL MEDICINE

## 2024-01-24 PROCEDURE — 1160F RVW MEDS BY RX/DR IN RCRD: CPT | Performed by: INTERNAL MEDICINE

## 2024-01-24 PROCEDURE — 1126F AMNT PAIN NOTED NONE PRSNT: CPT | Performed by: INTERNAL MEDICINE

## 2024-01-24 PROCEDURE — 3008F BODY MASS INDEX DOCD: CPT | Performed by: INTERNAL MEDICINE

## 2024-01-24 PROCEDURE — 1159F MED LIST DOCD IN RCRD: CPT | Performed by: INTERNAL MEDICINE

## 2024-01-24 PROCEDURE — 1111F DSCHRG MED/CURRENT MED MERGE: CPT | Performed by: INTERNAL MEDICINE

## 2024-01-24 PROCEDURE — 99214 OFFICE O/P EST MOD 30 MIN: CPT | Performed by: INTERNAL MEDICINE

## 2024-01-24 PROCEDURE — 1036F TOBACCO NON-USER: CPT | Performed by: INTERNAL MEDICINE

## 2024-01-24 PROCEDURE — 3077F SYST BP >= 140 MM HG: CPT | Performed by: INTERNAL MEDICINE

## 2024-01-24 RX ORDER — LISINOPRIL 10 MG/1
10 TABLET ORAL DAILY
Qty: 90 TABLET | Refills: 3 | Status: SHIPPED | OUTPATIENT
Start: 2024-01-24

## 2024-01-24 RX ORDER — LISINOPRIL 10 MG/1
10 TABLET ORAL DAILY
Qty: 30 TABLET | Refills: 11 | Status: SHIPPED | OUTPATIENT
Start: 2024-01-24 | End: 2024-01-24

## 2024-01-24 ASSESSMENT — PAIN SCALES - GENERAL: PAINLEVEL: 0-NO PAIN

## 2024-01-24 NOTE — PROGRESS NOTES
Name : Jovanny Yap   : 1950   MRN : [id number]   ENC Date : 2024      Assessment and Plan:  Hypertension: Not well-controlled.  Patient has normal renal function.  Add lisinopril 5 mg daily.  Dyslipidemia: Tolerating simvastatin well.  Continue this indefinitely.  Chest pain: Resolved.  No further workup needed.  Stress test was normal last year.  DVT: Status post DOAC therapy now off therapy on aspirin alone.  No recurrence.    Disp: RTO on an as-needed basis    HPI:  Patient seen by myself in follow-up for unclear reasons.  He was recently hospitalized with a small bowel obstruction which was decompressed with NG tube.  He is due to see general surgery again for possible hernia and lysis of adhesions surgery.  From a cardiac standpoint he reports no recurrent chest discomfort.  If you look at his blood pressures in the hospital the majority are mildly elevated.  None of them are terribly elevated but only 1 or 2 are actually normal.    Problem list overview:   Patient Active Problem List   Diagnosis    Acute kidney injury (CMS/HCC)    Renal cyst    Anxiety disorder    Benign essential hypertension    Bilateral sensorineural hearing loss    Bilateral varicoceles    Calf pain    Cerumen impaction    Excessive cerumen in right ear canal    Chronic fatigue syndrome    Chronic low back pain    Enlarged prostate    Fatty liver    Gastrocnemius tear    Rupture of right gastrocnemius tendon    GERD (gastroesophageal reflux disease)    Hydrocele, bilateral    Hyperlipidemia    Injury of right leg    Chest pain    Acute deep vein thrombosis (DVT) of femoral vein of left lower extremity (CMS/HCC)    Diarrhea    DVT (deep venous thrombosis) (CMS/HCC)    Elevated coronary artery calcium score    Flu-like symptoms    Incisional hernia    Other benign neoplasm of skin of other parts of face    Actinic keratosis    Small bowel obstruction (CMS/HCC)    Weakness    Chronic right shoulder pain    Kidney lesion     Intestinal adhesions with partial obstruction (CMS/HCC)    Renal oncocytoma    Generalized abdominal pain    Lactic acidosis    Abdominal pain, unspecified abdominal location       Meds:   Current Outpatient Medications on File Prior to Visit   Medication Sig Dispense Refill    aspirin 81 mg EC tablet Take 1 tablet (81 mg) by mouth once daily.      cyanocobalamin (Vitamin B-12) 1,000 mcg tablet Take 1 tablet (1,000 mcg) by mouth once daily at bedtime.      escitalopram (Lexapro) 5 mg tablet Take 1 tablet (5 mg) by mouth once daily. 90 tablet 1    pantoprazole (ProtoNix) 40 mg EC tablet Take 1 tablet (40 mg) by mouth once daily. 90 tablet 2    simvastatin (Zocor) 20 mg tablet TAKE 1 TABLET DAILY IN THE EVENING (Patient taking differently: Take 1 tablet (20 mg) by mouth once daily at bedtime.) 90 tablet 3    tamsulosin (Flomax) 0.4 mg 24 hr capsule Take 1 capsule (0.4 mg) by mouth once daily. 90 capsule 2    [DISCONTINUED] famotidine (Pepcid) 20 mg tablet Take 1 tablet (20 mg) by mouth once daily as needed.      [DISCONTINUED] ondansetron ODT (Zofran-ODT) 4 mg disintegrating tablet Take 1 tablet (4 mg) by mouth every 8 hours if needed for nausea or vomiting.       No current facility-administered medications on file prior to visit.        VS:  BP (!) 156/91 (BP Location: Right arm, Patient Position: Sitting, BP Cuff Size: Adult)   Pulse 63   Ht 1.829 m (6')   Wt 115 kg (254 lb 9.6 oz)   SpO2 96%   BMI 34.53 kg/m²     Vitals reviewed.   Neck:      Vascular: No JVD.   Pulmonary:      Effort: Pulmonary effort is normal.      Breath sounds: Normal breath sounds.   Cardiovascular:      Normal rate. Regular rhythm.      Murmurs: There is no murmur.      No gallop.    Pulses:     Intact distal pulses.   Edema:     Peripheral edema absent.   Abdominal:      General: Abdomen is flat.      Palpations: Abdomen is soft.   Neurological:      General: No focal deficit present.      Mental Status: Alert.   Psychiatric:          Mood and Affect: Mood normal.         Abdiaziz Hood MD

## 2024-01-29 ENCOUNTER — OFFICE VISIT (OUTPATIENT)
Dept: PRIMARY CARE | Facility: CLINIC | Age: 74
End: 2024-01-29
Payer: MEDICARE

## 2024-01-29 ENCOUNTER — TELEPHONE (OUTPATIENT)
Dept: PRIMARY CARE | Facility: CLINIC | Age: 74
End: 2024-01-29

## 2024-01-29 VITALS
OXYGEN SATURATION: 97 % | HEIGHT: 72 IN | SYSTOLIC BLOOD PRESSURE: 160 MMHG | WEIGHT: 254 LBS | HEART RATE: 69 BPM | DIASTOLIC BLOOD PRESSURE: 100 MMHG | BODY MASS INDEX: 34.4 KG/M2

## 2024-01-29 DIAGNOSIS — K59.00 CONSTIPATION, UNSPECIFIED CONSTIPATION TYPE: Primary | ICD-10-CM

## 2024-01-29 PROCEDURE — 1111F DSCHRG MED/CURRENT MED MERGE: CPT | Performed by: NURSE PRACTITIONER

## 2024-01-29 PROCEDURE — 3080F DIAST BP >= 90 MM HG: CPT | Performed by: NURSE PRACTITIONER

## 2024-01-29 PROCEDURE — 3077F SYST BP >= 140 MM HG: CPT | Performed by: NURSE PRACTITIONER

## 2024-01-29 PROCEDURE — 99213 OFFICE O/P EST LOW 20 MIN: CPT | Performed by: NURSE PRACTITIONER

## 2024-01-29 PROCEDURE — 3008F BODY MASS INDEX DOCD: CPT | Performed by: NURSE PRACTITIONER

## 2024-01-29 PROCEDURE — 1159F MED LIST DOCD IN RCRD: CPT | Performed by: NURSE PRACTITIONER

## 2024-01-29 PROCEDURE — 1126F AMNT PAIN NOTED NONE PRSNT: CPT | Performed by: NURSE PRACTITIONER

## 2024-01-29 PROCEDURE — 1036F TOBACCO NON-USER: CPT | Performed by: NURSE PRACTITIONER

## 2024-01-29 ASSESSMENT — PATIENT HEALTH QUESTIONNAIRE - PHQ9
SUM OF ALL RESPONSES TO PHQ9 QUESTIONS 1 AND 2: 0
1. LITTLE INTEREST OR PLEASURE IN DOING THINGS: NOT AT ALL
2. FEELING DOWN, DEPRESSED OR HOPELESS: NOT AT ALL

## 2024-01-29 NOTE — PROGRESS NOTES
Subjective   Patient ID: Jovanny Yap is a 73 y.o. male who presents for Constipation (Onset 3 days ago. Patient denies abdominal pain, bloating and cramping. //Started on lisinopril on 1/24/2024).    3 days ago got up and couldn't have a BM. Feel rectal impaction. Stool is at rectal opening but unable to come out.  Has not gotten any better.  Has uncomfortable pressure in the lower abdomen.    Tried Miralax yesterday. Didn't seem to work.  Apples and pears.   Was in the hospital recently for SBO. History of recurrent bowel obstructions 2/2 adhesions. Has follow up with surgery in 2 weeks to discuss.   He was cleared out and having normal Bms upon discharge from the hospital.  No signifiacnt abdominal pain. Appetite is good. Eating well. No vomiting.         Review of Systems  otherwise negative aside from what was mentioned above in HPI.    Objective   BP (!) 160/100 (BP Location: Right arm, Patient Position: Sitting)   Pulse 69   Ht 1.829 m (6')   Wt 115 kg (254 lb)   SpO2 97%   BMI 34.45 kg/m²     Physical Exam  Constitutional:       Appearance: Normal appearance.   Cardiovascular:      Rate and Rhythm: Normal rate and regular rhythm.   Pulmonary:      Effort: Pulmonary effort is normal.      Breath sounds: Normal breath sounds.   Abdominal:      General: Bowel sounds are normal. There is distension.      Palpations: Abdomen is soft.      Tenderness: There is no abdominal tenderness. There is no guarding.      Hernia: A hernia is present.   Neurological:      General: No focal deficit present.      Mental Status: He is alert and oriented to person, place, and time. Mental status is at baseline.   Psychiatric:         Mood and Affect: Mood normal.         Behavior: Behavior normal.         Thought Content: Thought content normal.         Judgment: Judgment normal.         Assessment/Plan   Problem List Items Addressed This Visit             ICD-10-CM    Constipation - Primary (Chronic) K59.00     Bowel  sounds present in all quadrants.  No abdominal tenderness.  Advised continue the miralax daily  Take 2 dulcolax pills tonight. If not BM by morning take a dulcolax suppository.  Close follow up with general surgery and back to ER if vomiting occurs given history of recurrent obstructions.   Close follow up with our office if not able to have BM

## 2024-01-29 NOTE — TELEPHONE ENCOUNTER
Patient was in [facility] last week for blockage in colon. Patient has been constipated for 3 days, and was advised not to take anything until he spoke with a Dr. Please advise of next steps.

## 2024-01-29 NOTE — ASSESSMENT & PLAN NOTE
Bowel sounds present in all quadrants.  No abdominal tenderness.  Advised continue the miralax daily  Take 2 dulcolax pills tonight. If not BM by morning take a dulcolax suppository.  Close follow up with general surgery and back to ER if vomiting occurs given history of recurrent obstructions.   Close follow up with our office if not able to have BM

## 2024-02-05 DIAGNOSIS — F41.9 ANXIETY DISORDER, UNSPECIFIED TYPE: ICD-10-CM

## 2024-02-05 DIAGNOSIS — E78.2 MIXED HYPERLIPIDEMIA: Chronic | ICD-10-CM

## 2024-02-05 RX ORDER — ESCITALOPRAM OXALATE 5 MG/1
5 TABLET ORAL DAILY
Qty: 90 TABLET | Refills: 1 | Status: SHIPPED | OUTPATIENT
Start: 2024-02-05 | End: 2024-02-05 | Stop reason: SDUPTHER

## 2024-02-05 RX ORDER — SIMVASTATIN 20 MG/1
20 TABLET, FILM COATED ORAL EVERY EVENING
Qty: 30 TABLET | Refills: 0 | Status: SHIPPED | OUTPATIENT
Start: 2024-02-05 | End: 2024-02-05 | Stop reason: SDUPTHER

## 2024-02-05 RX ORDER — ESCITALOPRAM OXALATE 5 MG/1
5 TABLET ORAL DAILY
Qty: 90 TABLET | Refills: 1 | Status: SHIPPED | OUTPATIENT
Start: 2024-02-05

## 2024-02-05 RX ORDER — SIMVASTATIN 20 MG/1
20 TABLET, FILM COATED ORAL EVERY EVENING
Qty: 90 TABLET | Refills: 3 | Status: SHIPPED | OUTPATIENT
Start: 2024-02-05 | End: 2024-02-06 | Stop reason: SDUPTHER

## 2024-02-05 NOTE — TELEPHONE ENCOUNTER
Pt needs refill for simvastatin - 90 day supply sent to [facility]    Also please send short script to [facility] - [place] - as pt is completely out right

## 2024-02-06 DIAGNOSIS — E78.2 MIXED HYPERLIPIDEMIA: Chronic | ICD-10-CM

## 2024-02-06 RX ORDER — SIMVASTATIN 20 MG/1
20 TABLET, FILM COATED ORAL EVERY EVENING
Qty: 90 TABLET | Refills: 3 | Status: SHIPPED | OUTPATIENT
Start: 2024-02-06

## 2024-02-06 NOTE — TELEPHONE ENCOUNTER
Pt would like the refill for simvastatin to go to [facility] for 90 day supply - [facility] the [facility]

## 2024-02-08 ENCOUNTER — OFFICE VISIT (OUTPATIENT)
Dept: SURGERY | Facility: CLINIC | Age: 74
End: 2024-02-08
Payer: MEDICARE

## 2024-02-08 VITALS
RESPIRATION RATE: 16 BRPM | BODY MASS INDEX: 34.27 KG/M2 | SYSTOLIC BLOOD PRESSURE: 150 MMHG | WEIGHT: 253 LBS | HEIGHT: 72 IN | DIASTOLIC BLOOD PRESSURE: 81 MMHG | HEART RATE: 75 BPM

## 2024-02-08 DIAGNOSIS — K43.2 INCISIONAL HERNIA, WITHOUT OBSTRUCTION OR GANGRENE: ICD-10-CM

## 2024-02-08 PROCEDURE — 1125F AMNT PAIN NOTED PAIN PRSNT: CPT | Performed by: SURGERY

## 2024-02-08 PROCEDURE — 1111F DSCHRG MED/CURRENT MED MERGE: CPT | Performed by: SURGERY

## 2024-02-08 PROCEDURE — 3079F DIAST BP 80-89 MM HG: CPT | Performed by: SURGERY

## 2024-02-08 PROCEDURE — 1036F TOBACCO NON-USER: CPT | Performed by: SURGERY

## 2024-02-08 PROCEDURE — 99205 OFFICE O/P NEW HI 60 MIN: CPT | Performed by: SURGERY

## 2024-02-08 PROCEDURE — 3008F BODY MASS INDEX DOCD: CPT | Performed by: SURGERY

## 2024-02-08 PROCEDURE — 1159F MED LIST DOCD IN RCRD: CPT | Performed by: SURGERY

## 2024-02-08 PROCEDURE — 3077F SYST BP >= 140 MM HG: CPT | Performed by: SURGERY

## 2024-02-08 ASSESSMENT — ENCOUNTER SYMPTOMS
RHINORRHEA: 0
NERVOUS/ANXIOUS: 0
ARTHRALGIAS: 0
JOINT SWELLING: 0
SORE THROAT: 0
LIGHT-HEADEDNESS: 0
HEMATURIA: 0
ABDOMINAL PAIN: 0
DIZZINESS: 0
NAUSEA: 0
DYSURIA: 0
BACK PAIN: 1
VOMITING: 0
COUGH: 0
EYE REDNESS: 0
PALPITATIONS: 0
CONFUSION: 0
WOUND: 0
CHILLS: 0
SHORTNESS OF BREATH: 0
FEVER: 0
WEAKNESS: 0

## 2024-02-08 ASSESSMENT — PAIN SCALES - GENERAL: PAINLEVEL: 2

## 2024-02-08 NOTE — H&P
General Surgery Outpatient Clinic Note      History Of Present Illness  Jovanny Yap is a 73 y.o. male presenting for evaluation of [id number] incisional hernia. More than 10yrs ago, patient had an emergent R. Hemicolectomy for colonic perforation after colonoscopy. He recovered well after this operation and needed no subsequent surgeries. Over the last 6 mo, he's had 3 admissions related to adhesive SBO. On work up for this issue, his hernia was discovered. He reports having occasional abdominal discomfort in the mornings but denies noticeable protrusion or pain with valsalva.      Past Medical History  Patient Active Problem List   Diagnosis    Acute kidney injury (CMS/HCC)    Renal cyst    Anxiety disorder    Benign essential hypertension    Bilateral sensorineural hearing loss    Bilateral varicoceles    Calf pain    Cerumen impaction    Excessive cerumen in right ear canal    Chronic fatigue syndrome    Chronic low back pain    Enlarged prostate    Fatty liver    Gastrocnemius tear    Rupture of right gastrocnemius tendon    GERD (gastroesophageal reflux disease)    Hydrocele, bilateral    Hyperlipidemia    Injury of right leg    Chest pain    Acute deep vein thrombosis (DVT) of femoral vein of left lower extremity (CMS/HCC)    Diarrhea    DVT (deep venous thrombosis) (CMS/HCC)    Elevated coronary artery calcium score    Flu-like symptoms    Incisional hernia    Other benign neoplasm of skin of other parts of face    Actinic keratosis    Small bowel obstruction (CMS/HCC)    Weakness    Chronic right shoulder pain    Kidney lesion    Intestinal adhesions with partial obstruction (CMS/HCC)    Renal oncocytoma    Generalized abdominal pain    Lactic acidosis    Abdominal pain, unspecified abdominal location    Constipation       Surgical History  He  Past Surgical History:   Procedure Laterality Date    COLONOSCOPY  11/26/2012    Complete Colonoscopy    EYE SURGERY  10/08/2013    Eye Surgery    OTHER SURGICAL  HISTORY  11/26/2012    Right Hemicolectomy        Social History  Neg x3    Family History  Family History   Problem Relation Name Age of Onset    Stroke Mother      Coronary artery disease Father          Allergies  Patient has no known allergies.    Review of Systems   Constitutional:  Negative for chills and fever.   HENT:  Negative for rhinorrhea and sore throat.    Eyes:  Negative for redness and visual disturbance.   Respiratory:  Negative for cough and shortness of breath.    Cardiovascular:  Negative for chest pain and palpitations.   Gastrointestinal:  Negative for abdominal pain, nausea and vomiting.   Genitourinary:  Negative for dysuria and hematuria.   Musculoskeletal:  Positive for back pain (Per baseline). Negative for arthralgias and joint swelling.   Skin:  Negative for rash and wound.   Neurological:  Negative for dizziness, syncope, weakness and light-headedness.   Psychiatric/Behavioral:  Negative for confusion. The patient is not nervous/anxious.        Home Meds   Current Outpatient Medications   Medication Instructions    aspirin 81 mg, oral, Daily    cyanocobalamin (Vitamin B-12) 1,000 mcg tablet 1 tablet, oral, Nightly    escitalopram (LEXAPRO) 5 mg, oral, Daily    lisinopril 10 mg, oral, Daily    pantoprazole (PROTONIX) 40 mg, oral, Daily    simvastatin (ZOCOR) 20 mg, oral, Every evening    tamsulosin (FLOMAX) 0.4 mg, oral, Daily       Last Recorded Vitals    Heart Rate:  [75] 75  Resp:  [16] 16  BP: (150)/(81) 150/81      Physical Exam  General: laying in bed, NAD  Head: normocephalic, atraumatic  ENT: mucosa are moist   Respiratory: nonlabored breathing on room air  CV: RRR  GI: abdomen is soft, nontender, nondistended Large ventral hernia on exam.   MSK: JOSELYN  Extremities: no swelling or deformity of b/l LEs   Neuro: CN II-XII grossly intact. Sensation to light touch intact      Labs   Lab Results   Component Value Date    WBC 5.4 01/22/2024    HGB 14.1 01/22/2024    HCT 41.9 01/22/2024     MCV 96 01/22/2024     (L) 01/22/2024     Lab Results   Component Value Date    GLUCOSE 93 01/22/2024    CALCIUM 8.5 (L) 01/22/2024     01/22/2024    K 4.2 01/22/2024    CO2 29 01/22/2024     01/22/2024    BUN 15 01/22/2024    CREATININE 1.21 01/22/2024     Lab Results   Component Value Date    ALT 20 01/22/2024    AST 20 01/22/2024    ALKPHOS 52 01/22/2024    BILITOT 0.7 01/22/2024               Imaging   CT abdomen pelvis w IV contrast    Result Date: 1/19/2024  STUDY: CT Abdomen and Pelvis with IV Contrast; 01/19/2024 7:24 pm INDICATION: Abdominal pain.  History of small bowel obstruction. COMPARISON: CT A/P 11/14/2023 and 10/22/2023. ACCESSION NUMBER(S): [id number] ORDERING CLINICIAN: KRYSTA REYNOLDS TECHNIQUE: CT of the abdomen and pelvis was performed.  Contiguous axial images were obtained at 3 mm slice thickness through the abdomen and pelvis. Coronal and sagittal reconstructions at 3 mm slice thickness were performed.  Omnipaque 350, 75 mL was administered intravenously.  FINDINGS: LOWER CHEST: No cardiomegaly.  No pericardial effusion.  Lung bases are clear.  ABDOMEN:  LIVER: No hepatomegaly.  Diffuse decreased attenuation throughout the liver. No focal lesions.  BILE DUCTS: No biliary ductal dilatation.  GALLBLADDER: The gallbladder is unremarkable in CT appearance. STOMACH: Moderate fluid and food in the stomach.  PANCREAS: No masses or ductal dilatation.  SPLEEN: No splenomegaly or focal splenic lesion.  ADRENAL GLANDS: No thickening or nodules.  KIDNEYS AND URETERS: Kidneys are normal in size and location.  Numerous large bilateral renal cysts.  No renal calculi.  No hydronephrosis.  PELVIS:  BLADDER: No abnormalities identified.  REPRODUCTIVE ORGANS: No abnormalities identified.  BOWEL: Abnormal bowel gas pattern.  Multiple mildly prominent fluid-filled loops of small bowel present measuring up to 3.5 cm in diameter.  No transition.  Prior right hemicolectomy with ileocolic  anastomosis in the right hemiabdomen.  Normal amount stool in the colon.  Moderate colonic diverticulosis.  VESSELS: No abnormalities identified.  Mild atherosclerotic vascular calcifications.  PERITONEUM/RETROPERITONEUM/LYMPH NODES: No free fluid.  No pneumoperitoneum. No lymphadenopathy.  ABDOMINAL WALL: Again demonstrated is diastases of the anterior abdominal wall in the midline.  This is associated small hernia in the right lower quadrant which contains an otherwise normal looking loop of small bowel.  There is also a small hernia in the left lower quadrant.  Previously this only contains fat but currently a portion of the colon partially extends into this hernia without evidence of obstruction or inflammation. Small fat-containing left inguinal hernia. SOFT TISSUES: No abnormalities identified.  BONES: No acute fracture or aggressive osseous lesion.    Abnormal bowel gas pattern with numerous prominent fluid-filled loops of small bowel, similar to the 11/14/2023 examination.  I cannot exclude a partial small bowel obstruction but the appearance is more compatible with ileus or enteritis.  The patient has had a prior right hemicolectomy with an intact ileocolic anastomosis in the right abdomen.  Moderate colonic diverticulosis.  No focal inflammatory changes.  No evidence of free fluid or free air. Fatty infiltration of the liver.  The gallbladder is unremarkable in CT appearance.  No evidence of biliary ductal dilatation. Numerous large bilateral renal cysts.  No renal calculi.  No hydronephrosis. Signed by Bob Hansen MD    Assessment   Mr. Yap, 73yoM presenting with large ventral hernia he is interested in fixing. Will proceed with hernia repair. Explained risks and benefits of surgery including risks related to anesthesia, hernia recurrence, bleeding, infection, bowel injury, and need for colostomy. Disused expected postoperative recovery and restrictions. All questions answered and patient  demonstrated good understanding.     Plan  - will schedule for open repair with mesh and possible TAR   - will arrange for PAT visit   - consent signed in office     Discussed with Attending Physician    Yolis Venegas MD  General Surgery PGY 5  [facility] [id number]

## 2024-02-13 LAB
ATRIAL RATE: 66 BPM
P AXIS: 92 DEGREES
P OFFSET: 175 MS
P ONSET: 136 MS
PR INTERVAL: 158 MS
Q ONSET: 215 MS
QRS COUNT: 11 BEATS
QRS DURATION: 98 MS
QT INTERVAL: 424 MS
QTC CALCULATION(BAZETT): 444 MS
QTC FREDERICIA: 438 MS
R AXIS: 37 DEGREES
T AXIS: 17 DEGREES
T OFFSET: 427 MS
VENTRICULAR RATE: 66 BPM

## 2024-02-15 DIAGNOSIS — K43.2 INCISIONAL HERNIA, WITHOUT OBSTRUCTION OR GANGRENE: ICD-10-CM

## 2024-02-22 ENCOUNTER — PATIENT OUTREACH (OUTPATIENT)
Dept: PRIMARY CARE | Facility: CLINIC | Age: 74
End: 2024-02-22
Payer: MEDICARE

## 2024-03-08 ENCOUNTER — PRE-ADMISSION TESTING (OUTPATIENT)
Dept: PREADMISSION TESTING | Facility: HOSPITAL | Age: 74
End: 2024-03-08
Payer: MEDICARE

## 2024-03-08 VITALS
OXYGEN SATURATION: 95 % | WEIGHT: 256.39 LBS | SYSTOLIC BLOOD PRESSURE: 120 MMHG | HEIGHT: 72 IN | DIASTOLIC BLOOD PRESSURE: 77 MMHG | TEMPERATURE: 97.8 F | HEART RATE: 63 BPM | BODY MASS INDEX: 34.73 KG/M2

## 2024-03-08 DIAGNOSIS — K43.2 INCISIONAL HERNIA, WITHOUT OBSTRUCTION OR GANGRENE: ICD-10-CM

## 2024-03-08 LAB
ABO GROUP (TYPE) IN BLOOD: NORMAL
ANION GAP SERPL CALC-SCNC: 13 MMOL/L (ref 10–20)
ANTIBODY SCREEN: NORMAL
BUN SERPL-MCNC: 18 MG/DL (ref 6–23)
CALCIUM SERPL-MCNC: 9.4 MG/DL (ref 8.6–10.6)
CHLORIDE SERPL-SCNC: 104 MMOL/L (ref 98–107)
CO2 SERPL-SCNC: 26 MMOL/L (ref 21–32)
CREAT SERPL-MCNC: 1.05 MG/DL (ref 0.5–1.3)
EGFRCR SERPLBLD CKD-EPI 2021: 75 ML/MIN/1.73M*2
ERYTHROCYTE [DISTWIDTH] IN BLOOD BY AUTOMATED COUNT: 12.9 % (ref 11.5–14.5)
GLUCOSE SERPL-MCNC: 95 MG/DL (ref 74–99)
HCT VFR BLD AUTO: 45.7 % (ref 41–52)
HGB BLD-MCNC: 15.6 G/DL (ref 13.5–17.5)
MCH RBC QN AUTO: 32.1 PG (ref 26–34)
MCHC RBC AUTO-ENTMCNC: 34.1 G/DL (ref 32–36)
MCV RBC AUTO: 94 FL (ref 80–100)
NRBC BLD-RTO: 0 /100 WBCS (ref 0–0)
PLATELET # BLD AUTO: 168 X10*3/UL (ref 150–450)
POTASSIUM SERPL-SCNC: 4.3 MMOL/L (ref 3.5–5.3)
RBC # BLD AUTO: 4.86 X10*6/UL (ref 4.5–5.9)
RH FACTOR (ANTIGEN D): NORMAL
SODIUM SERPL-SCNC: 139 MMOL/L (ref 136–145)
WBC # BLD AUTO: 5.6 X10*3/UL (ref 4.4–11.3)

## 2024-03-08 PROCEDURE — 99204 OFFICE O/P NEW MOD 45 MIN: CPT | Performed by: NURSE PRACTITIONER

## 2024-03-08 PROCEDURE — 80048 BASIC METABOLIC PNL TOTAL CA: CPT

## 2024-03-08 PROCEDURE — 85027 COMPLETE CBC AUTOMATED: CPT

## 2024-03-08 PROCEDURE — 86901 BLOOD TYPING SEROLOGIC RH(D): CPT

## 2024-03-08 PROCEDURE — 36415 COLL VENOUS BLD VENIPUNCTURE: CPT

## 2024-03-08 PROCEDURE — 87081 CULTURE SCREEN ONLY: CPT

## 2024-03-08 RX ORDER — CHLORHEXIDINE GLUCONATE 40 MG/ML
SOLUTION TOPICAL
Qty: 473 ML | Refills: 0 | Status: SHIPPED | OUTPATIENT
Start: 2024-03-08 | End: 2024-03-30 | Stop reason: HOSPADM

## 2024-03-08 RX ORDER — CHLORHEXIDINE GLUCONATE ORAL RINSE 1.2 MG/ML
SOLUTION DENTAL
Qty: 15 ML | Refills: 0 | Status: SHIPPED | OUTPATIENT
Start: 2024-03-08 | End: 2024-03-30 | Stop reason: HOSPADM

## 2024-03-08 ASSESSMENT — ENCOUNTER SYMPTOMS
EYES NEGATIVE: 1
NEUROLOGICAL NEGATIVE: 1
ENDOCRINE NEGATIVE: 1
CARDIOVASCULAR NEGATIVE: 1
MUSCULOSKELETAL NEGATIVE: 1
RESPIRATORY NEGATIVE: 1
CONSTITUTIONAL NEGATIVE: 1
NECK NEGATIVE: 1
GASTROINTESTINAL NEGATIVE: 1

## 2024-03-08 ASSESSMENT — DUKE ACTIVITY SCORE INDEX (DASI)
CAN YOU DO MODERATE WORK AROUND THE HOUSE LIKE VACUUMING, SWEEPING FLOORS OR CARRYING GROCERIES: YES
CAN YOU CLIMB A FLIGHT OF STAIRS OR WALK UP A HILL: YES
CAN YOU DO HEAVY WORK AROUND THE HOUSE LIKE SCRUBBING FLOORS OR LIFTING AND MOVING HEAVY FURNITURE: NO
CAN YOU PARTICIPATE IN MODERATE RECREATIONAL ACTIVITIES LIKE GOLF, BOWLING, DANCING, DOUBLES TENNIS OR THROWING A BASEBALL OR FOOTBALL: YES
CAN YOU TAKE CARE OF YOURSELF (EAT, DRESS, BATHE, OR USE TOILET): YES
CAN YOU HAVE SEXUAL RELATIONS: YES
CAN YOU WALK A BLOCK OR TWO ON LEVEL GROUND: YES
CAN YOU DO YARD WORK LIKE RAKING LEAVES, WEEDING OR PUSHING A MOWER: YES
CAN YOU RUN A SHORT DISTANCE: YES
TOTAL_SCORE: 50.2
CAN YOU WALK INDOORS, SUCH AS AROUND YOUR HOUSE: YES
CAN YOU PARTICIPATE IN STRENOUS SPORTS LIKE SWIMMING, SINGLES TENNIS, FOOTBALL, BASKETBALL, OR SKIING: YES
CAN YOU DO LIGHT WORK AROUND THE HOUSE LIKE DUSTING OR WASHING DISHES: YES
DASI METS SCORE: 8.9

## 2024-03-08 ASSESSMENT — CHADS2 SCORE
AGE GREATER THAN OR EQUAL TO 75: NO
CHF: NO
HYPERTENSION: YES
DIABETES: NO
PRIOR STROKE OR TIA OR THROMBOEMBOLISM: NO
CHADS2 SCORE: 1

## 2024-03-08 ASSESSMENT — LIFESTYLE VARIABLES: SMOKING_STATUS: NONSMOKER

## 2024-03-08 NOTE — PREPROCEDURE INSTRUCTIONS
Fasting Guidelines    NPO Instructions:    Do not eat any food after midnight the night before your surgery/procedure.  You may have up to TEN ounces of clear liquids until TWO hours before your instructed arrival time to the hospital. This includes water, black tea/coffee, (no milk or cream), apple juice, and/or electrolyte drinks (Gatorade).  You may chew gum up to TWO hours before your surgery/procedure.    Additional Instructions:     We have sent a prescription for Hibiclens soap and Peridex mouth wash to your preferred pharmacy.  If you have not already, Please  your prescription and start using as directed before surgery.  Follow the instruction sheet provided to you at your CPM/PAT appointment.    Avoid herbal supplements, multivitamins and NSAIDS (non-steroidal anti-inflammatory drugs) such as Advil, Aleve, Ibuprofen, Naproxen, Excedrin, Meloxicam or Celebrex for at least 7 days prior to surgery. May take Tylenol as needed.    Avoid tobacco and alcohol products for 24 hours prior to surgery.    CONTACT SURGEON'S OFFICE IF YOU DEVELOP:  * Fever = 100.4 F   * New respiratory symptoms (e.g. cough, shortness of breath, respiratory distress, sore throat)  * Recent loss of taste or smell  *Flu like symptoms such as headache, fatigue or gastrointestinal symptoms  * You develop any open sores, shingles, burning or painful urination   AND/OR:  * You no longer wish to have the surgery.  * Any other personal circumstances change that may lead to the need to cancel or defer this surgery.  *You were admitted to any hospital within one week of your planned procedure.    Seven/Six Days before Surgery:  Review your medication instructions, stop indicated medications    Day of Surgery:  Review your medication instructions, take indicated medications  Wear comfortable loose fitting clothing  Do not use moisturizers, creams, lotions or perfume  All jewelry and valuables should be left at home    Felisa Nj  [facility]  Myljf-[phone]  [phone]  [email or web address]     Perioperative Medicine  [phone]       Preoperative Brain Exercises    What are brain exercises?  A brain exercise is any activity that engages your thinking (cognitive) skills.    What types of activities are considered brain exercises?  Jigsaw puzzles, crossword puzzles, word jumble, memory games, word search, and many more.  Many can be found free online or on your phone via a mobile paz.    Why should I do brain exercises before my surgery?  More recent research has shown brain exercise before surgery can lower the risk of postoperative delirium (confusion) which can be especially important for older adults.  Patients who did brain exercises for 5 to 10 hours the days before surgery, cut their risk of postoperative delirium in half up to 1 week after surgery.         [facility]    Preoperative Deep Breathing Exercises    Why it is important to do deep breathing exercises before my surgery?  Deep breathing exercises strengthen your breathing muscles.  This helps you to recover after your surgery and decreases the chance of breathing complications.      How are the deep breathing exercises done?  Sit straight with your back supported.  Breathe in deeply and slowly through your nose. Your lower rib cage should expand and your abdomen may move forward.  Hold that breath for 3 to 5 seconds.  Breathe out through pursed lips, slowly and completely.  Rest and repeat 10 times every hour while awake.  Rest longer if you become dizzy or lightheaded.         Patient and Family Education             Ways You Can Help Prevent Blood Clots             This handout explains some simple things you can do to help prevent blood clots.      Blood clots are blockages that can form in the body's veins. When a blood clot forms in your deep veins, it may be called a  deep vein thrombosis, or DVT for short. Blood clots can happen in any part of the body where blood flows, but they are most common in the arms and legs. If a piece of a blood clot breaks free and travels to the lungs, it is called a pulmonary embolus (PE). A PE can be a very serious problem.         Being in the hospital or having surgery can raise your chances of getting a blood clot because you may not be well enough to move around as much as you normally do.         Ways you can help prevent blood clots in the hospital         Wearing SCDs. SCDs stands for Sequential Compression Devices.   SCDs are special sleeves that wrap around your legs  They attach to a pump that fills them with air to gently squeeze your legs every few minutes.   This helps return the blood in your legs to your heart.   SCDs should only be taken off when walking or bathing.   SCDs may not be comfortable, but they can help save your life.               Wearing compression stockings - if your doctor orders them. These special snug fitting stockings gently squeeze your legs to help blood flow.       Walking. Walking helps move the blood in your legs.   If your doctor says it is ok, try walking the halls at least   5 times a day. Ask us to help you get up, so you don't fall.      Taking any blood thinning medicines your doctor orders.        Page 1 of 2     [facility]; 3/23   Ways you can help prevent blood clots at home       Wearing compression stockings - if your doctor orders them. ? Walking - to help move the blood in your legs.       Taking any blood thinning medicines your doctor orders.      Signs of a blood clot or PE      Tell your doctor or nurse know right away if you have of the problems listed below.    If you are at home, seek medical care right away. Call 911 for chest pain or problems breathing.               Signs of a blood clot (DVT) - such as pain,  swelling, redness or warmth in your arm or leg      Signs of a  pulmonary embolism (PE) - such as chest     pain or feeling short of breath

## 2024-03-08 NOTE — CPM/PAT H&P
CPM/PAT Evaluation       Name: Jovanny Yap (Jovanny Yap)  /Age:  y.o.     Visit Type:   In-Person       Chief Complaint: ventral hernia    HPI  The patient is a 73 year old male with 34w23sn incisional hernia. More than 10yrs ago, patient had an emergent R. Hemicolectomy for colonic perforation after colonoscopy. He recovered well after this operation and needed no subsequent surgeries. Over the last 6 mo, he's had 3 admissions related to adhesive SBO.  He notes occasional abdominal discomfort. He otherwise denies fever, chills, n/v, chest pain, sob or changes in bowel or bladder pattern. He presents today for perioperative evaluation in anticipation of Repair Ventral Hernia on 3/22/24 with Dr. Espino.    Past Medical History:   Diagnosis Date    MAT (acute kidney injury) (CMS/[facility])     Anxiety     BPH (benign prostatic hyperplasia)     taking tamsulosin    Colon polyp     DVT (deep venous thrombosis) (CMS/[facility]) 2023    provoked after 8H road trip    Fatty liver     GERD (gastroesophageal reflux disease)     F/W PCP- taking protonix    HL (hearing loss)     b/l    Hyperlipidemia     F/W cards- taking simvastatin    Hypertension     F/W cards- takes lisinopril    Incisional hernia     Renal cyst     Renal lesion 2023    s/p biopsy    Small bowel obstruction (CMS/HCC) 2023    Vision loss     wears glasses       Past Surgical History:   Procedure Laterality Date    COLONOSCOPY  01/10/2024    EYE SURGERY      Eye Surgery    HEMICOLECTOMY Right     RENAL BIOPSY  2023    TONSILLECTOMY         Patient Sexual activity questions deferred to the physician.    Family History   Problem Relation Name Age of Onset    Stroke Mother      Coronary artery disease Father         No Known Allergies    Prior to Admission medications    Medication Sig Start Date End Date Taking? Authorizing Provider   aspirin 81 mg EC tablet Take 1 tablet (81 mg) by mouth once daily.    Historical Provider, MD    cyanocobalamin (Vitamin B-12) 1,000 mcg tablet Take 1 tablet (1,000 mcg) by mouth once daily at bedtime.    Historical Provider, MD   escitalopram (Lexapro) 5 mg tablet Take 1 tablet (5 mg) by mouth once daily. 2/5/24   Jhonny Cuevas DO   lisinopril 10 mg tablet TAKE 1 TABLET(10 MG) BY MOUTH EVERY DAY 1/24/24   Abdiaziz Hood MD   pantoprazole (ProtoNix) 40 mg EC tablet Take 1 tablet (40 mg) by mouth once daily. 6/22/23   Jhonny Cuevas DO   simvastatin (Zocor) 20 mg tablet Take 1 tablet (20 mg) by mouth once daily in the evening. 2/6/24   Jhonny Cuevas DO   tamsulosin (Flomax) 0.4 mg 24 hr capsule Take 1 capsule (0.4 mg) by mouth once daily. 4/11/23   Jhonny Cuevas DO        PAT ROS:   Constitutional:   neg    Neuro/Psych:   neg    Eyes:   neg     use of corrective lenses  Ears:    hearing loss  Nose:   neg    Mouth:   neg    Throat:   neg    Neck:   neg    Cardio:   neg    Respiratory:   neg    Endocrine:   neg    GI:   neg    :   neg    Musculoskeletal:   neg    Hematologic:   neg    Skin:  neg        Physical Exam  Vitals reviewed.   Constitutional:       Appearance: Normal appearance.   HENT:      Head: Normocephalic and atraumatic.      Nose: Nose normal.      Mouth/Throat:      Mouth: Mucous membranes are moist.      Pharynx: Oropharynx is clear.   Eyes:      Extraocular Movements: Extraocular movements intact.      Pupils: Pupils are equal, round, and reactive to light.   Cardiovascular:      Rate and Rhythm: Normal rate and regular rhythm.      Pulses: Normal pulses.      Heart sounds: Normal heart sounds.   Pulmonary:      Effort: Pulmonary effort is normal.      Breath sounds: Normal breath sounds.   Musculoskeletal:         General: Normal range of motion.      Cervical back: Normal range of motion.   Skin:     General: Skin is warm and dry.   Neurological:      General: No focal deficit present.      Mental Status: He is alert and oriented to person, place, and time.   Psychiatric:          Mood and Affect: Mood normal.         Behavior: Behavior normal.          PAT AIRWAY:   Airway:     Mallampati::  III    TM distance::  >3 FB    Neck ROM::  Full  normal        Visit Vitals  /77   Pulse 63   Temp 36.6 °C (97.8 °F) (Oral)       DASI Risk Score      Flowsheet Row Most Recent Value   DASI SCORE 50.2   METS Score (Will be calculated only when all the questions are answered) 8.9          Caprini DVT Assessment      Flowsheet Row Most Recent Value   DVT Score 11   Current Status Major surgery planned, lasting over 3 hours   History Prior major surgery   Age 60-75 years   BMI 31-40 (Obesity)          Modified Frailty Index      Flowsheet Row Most Recent Value   Modified Frailty Index Calculator .0909          CHADS2 Stroke Risk  Current as of today        N/A 3 - 100%: High Risk   2 - 3%: Medium Risk   0 - 2%: Low Risk     Last Change: N/A          This score determines the patient's risk of having a stroke if the patient has atrial fibrillation.        This score is not applicable to this patient. Components are not calculated.          Revised Cardiac Risk Index      Flowsheet Row Most Recent Value   Revised Cardiac Risk Calculator 0          Apfel Simplified Score      Flowsheet Row Most Recent Value   Apfel Simplified Score Calculator 2          Risk Analysis Index Results This Encounter    No data found in the last 1 encounters.       Stop Bang Score      Flowsheet Row Most Recent Value   Do you snore loudly? 1   Do you often feel tired or fatigued after your sleep? 0   Has anyone ever observed you stop breathing in your sleep? 0   Do you have or are you being treated for high blood pressure? 1   Recent BMI (Calculated) 34.3   Is BMI greater than 35 kg/m2? 0=No   Age older than 50 years old? 1=Yes   Is your neck circumference greater than 17 inches (Male) or 16 inches (Female)? 0   Gender - Male 1=Yes   STOP-BANG Total Score 4          Recent Results (from the past 168 hour(s))   CBC     Collection Time: 03/08/24  2:55 PM   Result Value Ref Range    WBC 5.6 4.4 - 11.3 x10*3/uL    nRBC 0.0 0.0 - 0.0 /100 WBCs    RBC 4.86 4.50 - 5.90 x10*6/uL    Hemoglobin 15.6 13.5 - 17.5 g/dL    Hematocrit 45.7 41.0 - 52.0 %    MCV 94 80 - 100 fL    MCH 32.1 26.0 - 34.0 pg    MCHC 34.1 32.0 - 36.0 g/dL    RDW 12.9 11.5 - 14.5 %    Platelets 168 150 - 450 x10*3/uL   Basic Metabolic Panel    Collection Time: 03/08/24  2:55 PM   Result Value Ref Range    Glucose 95 74 - 99 mg/dL    Sodium 139 136 - 145 mmol/L    Potassium 4.3 3.5 - 5.3 mmol/L    Chloride 104 98 - 107 mmol/L    Bicarbonate 26 21 - 32 mmol/L    Anion Gap 13 10 - 20 mmol/L    Urea Nitrogen 18 6 - 23 mg/dL    Creatinine 1.05 0.50 - 1.30 mg/dL    eGFR 75 >60 mL/min/1.73m*2    Calcium 9.4 8.6 - 10.6 mg/dL   Type And Screen    Collection Time: 03/08/24  2:55 PM   Result Value Ref Range    ABO TYPE A     Rh TYPE POS     ANTIBODY SCREEN NEG    Staphylococcus aureus/MRSA colonization, Culture    Collection Time: 03/08/24  2:55 PM    Specimen: Anterior Nares; Swab   Result Value Ref Range    Staph/MRSA Screen Culture No Staphylococcus aureus isolated         Diagnostic Results    EKG 1/19/24  Demand pacemaker, interpretation is based on intrinsic rhythm  Sinus rhythm and Premature ventricular complexes or Fusion complexes  Otherwise normal ECG  When compared with ECG of 14-NOV-2023 11:57,  Electronic demand pacing is now Present    Exercise Stress Echo   Study Date: 7/12/2023   Summary:  1. The resting ejection fraction was estimated at 60% with a peak exercise ejection fraction estimated at 70%.  2. PEAK HR= 120 which is 82% of APMHR.  3. PEAK BP= 160/84.  4. PEAK METS Achieved= 7.20.  5. Functional Capacity= Average.  6. Pt reached 82% of his APMHR but was exerciced to his maximum potential.  7. Adequate level of stress achieved.  8. No clinical, echocardiographic or electrocardiographic evidence for ischemia at a maximal workload.  9. Normal Stress  Test.    Assessment and Plan:     Anesthesia:  The patient denies problems with anesthesia in the past such as PONV, prolonged sedation, awareness, dental damage, aspiration, cardiac arrest, difficult intubation, or unexpected hospital admissions.     Neuro:   The patient has no neurological diagnoses, however, the patient is at increased risk for postoperative delirium secondary to age 65 or older. The patient is at increased risk for perioperative stroke secondary to hypertension , hyperlipidemia, general anesthesia, operative time >2.5 hours. Handouts for preoperative brain exercises given to patient.    HEENT/Airway  The patient has diagnoses, significant findings on chart review, clinical presentation or evaluation of obesity, short thick neck. No documented or reported history of airway difficulty.     Cardiovascular  The patient is scheduled for non-cardiac surgery associated with elevated risk.  The patient has no major cardiac contraindications to non- cardiac surgery.  RCRI  The patient meets 0-1 RCRI criteria and therefore has a less than 1% risk of major adverse cardiac complications.  METS  The patient's functional capacity capacity is greater than 4 METS.  EKG  The patient has no EKG or echocardiographic changes concerning for myocardial ischemia.   Heart Failure  The patient has no known history of heart failure.  Additionally, the patient reports no symptoms of heart failure and demonstrates no signs of heart failure.  Hypertension Evaluation  The patient has a known history of hypertension that is controlled.  Patient's hypertension is most consistent with stage 1.  Heart Rhythm Evaluation  The patient has no history of arrhythmias.  Heart Valve Evaluation  The patient has no known history of valvular heart disease. The patient has no symptoms or physical exam findings to suggest valvular heart disease.  CARDS EVAL  The patient follows with cardiology, Dr. Abdiaziz Hood. Patient was last seen  1/24/24. Per note, no cardiac history. Seen post hospitalization. No current complaints of chest discomfort.    The patient has a 30-day risk for MACE of 0 predictors, 3.9% risk for cardiac death, nonfatal myocardial infarction, and nonfatal cardiac arrest.  MOHINI score which indicates a 0.1% risk of intraoperative or 30-day postoperative.    Pulmonary   No significant findings on chart review or clinical presentation and evaluation. The patient is at increased risk of perioperative pulmonary complications secondary to upper abdominal surgery, advanced age greater than 60, morbid obesity.    The patient has a stop bang score of 4, which places patient at intermediate risk for having PETERSON.    ARISCAT 41, Intermediate, 13.3% risk of in-hospital postoperative pulmonary complications  PRODIGY 23, high risk of respiratory depression episode. Patient given PI sheet for preoperative deep breathing exercises.    Hematology  The patient has diagnoses or significant findings on chart review or clinical presentation and evaluation significant for history of DVT 8/2023. He is s/p DOAC, now maintained on aspirin. He is followed by Vascular Dr. Albert Beard, last seen 12/6/23.  Antiplatelet management   The patient is currently receiving antiplatelet therapy for primary prevention of cardiovascular disease., The patient was instructed to continue in the perioperative period.  Anticoagulation management  The patient is not currently receiving anticoagulation therapy. Patient provided with DVT educational handout.      Caprini score 11, high risk of perioperative VTE.   Patient instructed to ambulate as soon as possible postoperatively to decrease thromboembolic risk. Initiate mechanical DVT prophylaxis as soon as possible and initiate chemical prophylaxis when deemed safe from a bleeding standpoint post surgery.     Transfusion Evaluation  A type and screen was obtained given the likelihood for perioperative transfusion of blood  or blood products.    Gastrointestinal  The patient has diagnoses or significant findings on chart review or clinical presentation and evaluation significant for history of constipation, recurrent SBO, abdominal pain in the setting of ventral hernia. Scheduled for repair on 3/22/4 with Dr. Espino.   Eat 10- 0,  self-perceived oropharyngeal dysphagia scale (0-40)     Genitourinary  The patient has diagnoses or significant findings on chart review or clinical presentation and evaluation significant for BPH on Flomax, renal oncocytoma followed by urology Dr. Rosendo Ziegler, last seen 11/2/23.    Renal  The patient has no known history of chronic kidney disease. No renal diagnoses or significant findings on chart review or clinical presentation and evaluation. The patient has specific risk factors associated with increased risk of perioperative renal complications due to age greater than 55, male gender, hypertension. Preventative measures include preoperative hydration..     Musculoskeletal  No diagnoses or significant findings on chart review or clinical presentation and evaluation.    Endocrine  Diabetes Evaluation  The patient has no history of diabetes mellitus  Thyroid Disease Evaluation  The patient has no history of thyroid disease.    ID  No diagnoses or significant findings on chart review or clinical presentation and evaluation. MRSA screening obtained. Prescriptions and instructions given for Hibiclens and Peridex.    -Preoperative medication instructions were provided and reviewed with the patient.  Any additional testing or evaluation was explained to the patient.  NPO Instructions were discussed, and the patient's questions were answered prior to conclusion of this encounter.

## 2024-03-08 NOTE — H&P (VIEW-ONLY)
CPM/PAT Evaluation       Name: Jovanny Yap (Jovanny Yap)  /Age:  y.o.     Visit Type:   In-Person       Chief Complaint: ventral hernia    HPI  The patient is a 73 year old male with [id number] incisional hernia. More than 10yrs ago, patient had an emergent R. Hemicolectomy for colonic perforation after colonoscopy. He recovered well after this operation and needed no subsequent surgeries. Over the last 6 mo, he's had 3 admissions related to adhesive SBO.  He notes occasional abdominal discomfort. He otherwise denies fever, chills, n/v, chest pain, sob or changes in bowel or bladder pattern. He presents today for perioperative evaluation in anticipation of Repair Ventral Hernia on 3/22/24 with Dr. Espino.    Past Medical History:   Diagnosis Date    MAT (acute kidney injury) (CMS/[facility])     Anxiety     BPH (benign prostatic hyperplasia)     taking tamsulosin    Colon polyp     DVT (deep venous thrombosis) (CMS/[facility]) 2023    provoked after 8H road trip    Fatty liver     GERD (gastroesophageal reflux disease)     F/W PCP- taking protonix    HL (hearing loss)     b/l    Hyperlipidemia     F/W cards- taking simvastatin    Hypertension     F/W cards- takes lisinopril    Incisional hernia     Renal cyst     Renal lesion 2023    s/p biopsy    Small bowel obstruction (CMS/HCC) 2023    Vision loss     wears glasses       Past Surgical History:   Procedure Laterality Date    COLONOSCOPY  01/10/2024    EYE SURGERY      Eye Surgery    HEMICOLECTOMY Right     RENAL BIOPSY  2023    TONSILLECTOMY         Patient Sexual activity questions deferred to the physician.    Family History   Problem Relation Name Age of Onset    Stroke Mother      Coronary artery disease Father         No Known Allergies    Prior to Admission medications    Medication Sig Start Date End Date Taking? Authorizing Provider   aspirin 81 mg EC tablet Take 1 tablet (81 mg) by mouth once daily.    Historical Provider, MD    cyanocobalamin (Vitamin B-12) 1,000 mcg tablet Take 1 tablet (1,000 mcg) by mouth once daily at bedtime.    Historical Provider, MD   escitalopram (Lexapro) 5 mg tablet Take 1 tablet (5 mg) by mouth once daily. 2/5/24   Jhonny Cuevas DO   lisinopril 10 mg tablet TAKE 1 TABLET(10 MG) BY MOUTH EVERY DAY 1/24/24   Abdiaziz Hood MD   pantoprazole (ProtoNix) 40 mg EC tablet Take 1 tablet (40 mg) by mouth once daily. 6/22/23   Jhonny Cuevas DO   simvastatin (Zocor) 20 mg tablet Take 1 tablet (20 mg) by mouth once daily in the evening. 2/6/24   Jhonny Cuevas DO   tamsulosin (Flomax) 0.4 mg 24 hr capsule Take 1 capsule (0.4 mg) by mouth once daily. 4/11/23   Jhonny Cuevas DO        PAT ROS:   Constitutional:   neg    Neuro/Psych:   neg    Eyes:   neg     use of corrective lenses  Ears:    hearing loss  Nose:   neg    Mouth:   neg    Throat:   neg    Neck:   neg    Cardio:   neg    Respiratory:   neg    Endocrine:   neg    GI:   neg    :   neg    Musculoskeletal:   neg    Hematologic:   neg    Skin:  neg        Physical Exam  Vitals reviewed.   Constitutional:       Appearance: Normal appearance.   HENT:      Head: Normocephalic and atraumatic.      Nose: Nose normal.      Mouth/Throat:      Mouth: Mucous membranes are moist.      Pharynx: Oropharynx is clear.   Eyes:      Extraocular Movements: Extraocular movements intact.      Pupils: Pupils are equal, round, and reactive to light.   Cardiovascular:      Rate and Rhythm: Normal rate and regular rhythm.      Pulses: Normal pulses.      Heart sounds: Normal heart sounds.   Pulmonary:      Effort: Pulmonary effort is normal.      Breath sounds: Normal breath sounds.   Musculoskeletal:         General: Normal range of motion.      Cervical back: Normal range of motion.   Skin:     General: Skin is warm and dry.   Neurological:      General: No focal deficit present.      Mental Status: He is alert and oriented to person, place, and time.   Psychiatric:          Mood and Affect: Mood normal.         Behavior: Behavior normal.          PAT AIRWAY:   Airway:     Mallampati::  III    TM distance::  >3 FB    Neck ROM::  Full  normal        Visit Vitals  /77   Pulse 63   Temp 36.6 °C (97.8 °F) (Oral)       DASI Risk Score      Flowsheet Row Most Recent Value   DASI SCORE 50.2   METS Score (Will be calculated only when all the questions are answered) 8.9          Caprini DVT Assessment      Flowsheet Row Most Recent Value   DVT Score 11   Current Status Major surgery planned, lasting over 3 hours   History Prior major surgery   Age 60-75 years   BMI 31-40 (Obesity)          Modified Frailty Index      Flowsheet Row Most Recent Value   Modified Frailty Index Calculator .0909          CHADS2 Stroke Risk  Current as of today        N/A 3 - 100%: High Risk   2 - 3%: Medium Risk   0 - 2%: Low Risk     Last Change: N/A          This score determines the patient's risk of having a stroke if the patient has atrial fibrillation.        This score is not applicable to this patient. Components are not calculated.          Revised Cardiac Risk Index      Flowsheet Row Most Recent Value   Revised Cardiac Risk Calculator 0          Apfel Simplified Score      Flowsheet Row Most Recent Value   Apfel Simplified Score Calculator 2          Risk Analysis Index Results This Encounter    No data found in the last 1 encounters.       Stop Bang Score      Flowsheet Row Most Recent Value   Do you snore loudly? 1   Do you often feel tired or fatigued after your sleep? 0   Has anyone ever observed you stop breathing in your sleep? 0   Do you have or are you being treated for high blood pressure? 1   Recent BMI (Calculated) 34.3   Is BMI greater than 35 kg/m2? 0=No   Age older than 50 years old? 1=Yes   Is your neck circumference greater than 17 inches (Male) or 16 inches (Female)? 0   Gender - Male 1=Yes   STOP-BANG Total Score 4          Recent Results (from the past 168 hour(s))   CBC     Collection Time: 03/08/24  2:55 PM   Result Value Ref Range    WBC 5.6 4.4 - 11.3 x10*3/uL    nRBC 0.0 0.0 - 0.0 /100 WBCs    RBC 4.86 4.50 - 5.90 x10*6/uL    Hemoglobin 15.6 13.5 - 17.5 g/dL    Hematocrit 45.7 41.0 - 52.0 %    MCV 94 80 - 100 fL    MCH 32.1 26.0 - 34.0 pg    MCHC 34.1 32.0 - 36.0 g/dL    RDW 12.9 11.5 - 14.5 %    Platelets 168 150 - 450 x10*3/uL   Basic Metabolic Panel    Collection Time: 03/08/24  2:55 PM   Result Value Ref Range    Glucose 95 74 - 99 mg/dL    Sodium 139 136 - 145 mmol/L    Potassium 4.3 3.5 - 5.3 mmol/L    Chloride 104 98 - 107 mmol/L    Bicarbonate 26 21 - 32 mmol/L    Anion Gap 13 10 - 20 mmol/L    Urea Nitrogen 18 6 - 23 mg/dL    Creatinine 1.05 0.50 - 1.30 mg/dL    eGFR 75 >60 mL/min/1.73m*2    Calcium 9.4 8.6 - 10.6 mg/dL   Type And Screen    Collection Time: 03/08/24  2:55 PM   Result Value Ref Range    ABO TYPE A     Rh TYPE POS     ANTIBODY SCREEN NEG    Staphylococcus aureus/MRSA colonization, Culture    Collection Time: 03/08/24  2:55 PM    Specimen: Anterior Nares; Swab   Result Value Ref Range    Staph/MRSA Screen Culture No Staphylococcus aureus isolated         Diagnostic Results    EKG 1/19/24  Demand pacemaker, interpretation is based on intrinsic rhythm  Sinus rhythm and Premature ventricular complexes or Fusion complexes  Otherwise normal ECG  When compared with ECG of 14-NOV-2023 11:57,  Electronic demand pacing is now Present    Exercise Stress Echo   Study Date: 7/12/2023   Summary:  1. The resting ejection fraction was estimated at 60% with a peak exercise ejection fraction estimated at 70%.  2. PEAK HR= 120 which is 82% of APMHR.  3. PEAK BP= 160/84.  4. PEAK METS Achieved= 7.20.  5. Functional Capacity= Average.  6. Pt reached 82% of his APMHR but was exerciced to his maximum potential.  7. Adequate level of stress achieved.  8. No clinical, echocardiographic or electrocardiographic evidence for ischemia at a maximal workload.  9. Normal Stress  Test.    Assessment and Plan:     Anesthesia:  The patient denies problems with anesthesia in the past such as PONV, prolonged sedation, awareness, dental damage, aspiration, cardiac arrest, difficult intubation, or unexpected hospital admissions.     Neuro:   The patient has no neurological diagnoses, however, the patient is at increased risk for postoperative delirium secondary to age 65 or older. The patient is at increased risk for perioperative stroke secondary to hypertension , hyperlipidemia, general anesthesia, operative time >2.5 hours. Handouts for preoperative brain exercises given to patient.    HEENT/Airway  The patient has diagnoses, significant findings on chart review, clinical presentation or evaluation of obesity, short thick neck. No documented or reported history of airway difficulty.     Cardiovascular  The patient is scheduled for non-cardiac surgery associated with elevated risk.  The patient has no major cardiac contraindications to non- cardiac surgery.  RCRI  The patient meets 0-1 RCRI criteria and therefore has a less than 1% risk of major adverse cardiac complications.  METS  The patient's functional capacity capacity is greater than 4 METS.  EKG  The patient has no EKG or echocardiographic changes concerning for myocardial ischemia.   Heart Failure  The patient has no known history of heart failure.  Additionally, the patient reports no symptoms of heart failure and demonstrates no signs of heart failure.  Hypertension Evaluation  The patient has a known history of hypertension that is controlled.  Patient's hypertension is most consistent with stage 1.  Heart Rhythm Evaluation  The patient has no history of arrhythmias.  Heart Valve Evaluation  The patient has no known history of valvular heart disease. The patient has no symptoms or physical exam findings to suggest valvular heart disease.  CARDS EVAL  The patient follows with cardiology, Dr. Abdiaziz Hood. Patient was last seen  1/24/24. Per note, no cardiac history. Seen post hospitalization. No current complaints of chest discomfort.    The patient has a 30-day risk for MACE of 0 predictors, 3.9% risk for cardiac death, nonfatal myocardial infarction, and nonfatal cardiac arrest.  MOHINI score which indicates a 0.1% risk of intraoperative or 30-day postoperative.    Pulmonary   No significant findings on chart review or clinical presentation and evaluation. The patient is at increased risk of perioperative pulmonary complications secondary to upper abdominal surgery, advanced age greater than 60, morbid obesity.    The patient has a stop bang score of 4, which places patient at intermediate risk for having PETERSON.    ARISCAT 41, Intermediate, 13.3% risk of in-hospital postoperative pulmonary complications  PRODIGY 23, high risk of respiratory depression episode. Patient given PI sheet for preoperative deep breathing exercises.    Hematology  The patient has diagnoses or significant findings on chart review or clinical presentation and evaluation significant for history of DVT 8/2023. He is s/p DOAC, now maintained on aspirin. He is followed by Vascular Dr. Albert Beard, last seen 12/6/23.  Antiplatelet management   The patient is currently receiving antiplatelet therapy for primary prevention of cardiovascular disease., The patient was instructed to continue in the perioperative period.  Anticoagulation management  The patient is not currently receiving anticoagulation therapy. Patient provided with DVT educational handout.      Caprini score 11, high risk of perioperative VTE.   Patient instructed to ambulate as soon as possible postoperatively to decrease thromboembolic risk. Initiate mechanical DVT prophylaxis as soon as possible and initiate chemical prophylaxis when deemed safe from a bleeding standpoint post surgery.     Transfusion Evaluation  A type and screen was obtained given the likelihood for perioperative transfusion of blood  or blood products.    Gastrointestinal  The patient has diagnoses or significant findings on chart review or clinical presentation and evaluation significant for history of constipation, recurrent SBO, abdominal pain in the setting of ventral hernia. Scheduled for repair on 3/22/4 with Dr. Espino.   Eat 10- 0,  self-perceived oropharyngeal dysphagia scale (0-40)     Genitourinary  The patient has diagnoses or significant findings on chart review or clinical presentation and evaluation significant for BPH on Flomax, renal oncocytoma followed by urology Dr. Rosendo Ziegler, last seen 11/2/23.    Renal  The patient has no known history of chronic kidney disease. No renal diagnoses or significant findings on chart review or clinical presentation and evaluation. The patient has specific risk factors associated with increased risk of perioperative renal complications due to age greater than 55, male gender, hypertension. Preventative measures include preoperative hydration..     Musculoskeletal  No diagnoses or significant findings on chart review or clinical presentation and evaluation.    Endocrine  Diabetes Evaluation  The patient has no history of diabetes mellitus  Thyroid Disease Evaluation  The patient has no history of thyroid disease.    ID  No diagnoses or significant findings on chart review or clinical presentation and evaluation. MRSA screening obtained. Prescriptions and instructions given for Hibiclens and Peridex.    -Preoperative medication instructions were provided and reviewed with the patient.  Any additional testing or evaluation was explained to the patient.  NPO Instructions were discussed, and the patient's questions were answered prior to conclusion of this encounter.

## 2024-03-10 LAB — STAPHYLOCOCCUS SPEC CULT: NORMAL

## 2024-03-21 ENCOUNTER — ANESTHESIA EVENT (OUTPATIENT)
Dept: OPERATING ROOM | Facility: HOSPITAL | Age: 74
DRG: 354 | End: 2024-03-21
Payer: MEDICARE

## 2024-03-22 ENCOUNTER — ANESTHESIA (OUTPATIENT)
Dept: OPERATING ROOM | Facility: HOSPITAL | Age: 74
DRG: 354 | End: 2024-03-22
Payer: MEDICARE

## 2024-03-22 ENCOUNTER — HOSPITAL ENCOUNTER (INPATIENT)
Facility: HOSPITAL | Age: 74
LOS: 8 days | Discharge: HOME | DRG: 354 | End: 2024-03-30
Attending: SURGERY | Admitting: SURGERY
Payer: MEDICARE

## 2024-03-22 DIAGNOSIS — K43.2 INCISIONAL HERNIA, WITHOUT OBSTRUCTION OR GANGRENE: ICD-10-CM

## 2024-03-22 DIAGNOSIS — Z98.890 S/P REPAIR OF VENTRAL HERNIA: ICD-10-CM

## 2024-03-22 DIAGNOSIS — Z87.19 S/P REPAIR OF VENTRAL HERNIA: ICD-10-CM

## 2024-03-22 DIAGNOSIS — K43.2 INCISIONAL HERNIA OF ANTERIOR ABDOMINAL WALL WITHOUT OBSTRUCTION OR GANGRENE: Primary | ICD-10-CM

## 2024-03-22 PROCEDURE — 2500000004 HC RX 250 GENERAL PHARMACY W/ HCPCS (ALT 636 FOR OP/ED)

## 2024-03-22 PROCEDURE — 15734 MUSCLE-SKIN GRAFT TRUNK: CPT | Performed by: SURGERY

## 2024-03-22 PROCEDURE — 2500000005 HC RX 250 GENERAL PHARMACY W/O HCPCS: Performed by: SURGERY

## 2024-03-22 PROCEDURE — 0HB7XZZ EXCISION OF ABDOMEN SKIN, EXTERNAL APPROACH: ICD-10-PCS | Performed by: SURGERY

## 2024-03-22 PROCEDURE — A49596 PR RPR AA HERNIA 1ST > 10 CM NCRC8/STRANGULATED: Performed by: STUDENT IN AN ORGANIZED HEALTH CARE EDUCATION/TRAINING PROGRAM

## 2024-03-22 PROCEDURE — P9045 ALBUMIN (HUMAN), 5%, 250 ML: HCPCS | Mod: JZ

## 2024-03-22 PROCEDURE — 11042 DBRDMT SUBQ TIS 1ST 20SQCM/<: CPT | Performed by: SURGERY

## 2024-03-22 PROCEDURE — 3700000001 HC GENERAL ANESTHESIA TIME - INITIAL BASE CHARGE: Performed by: SURGERY

## 2024-03-22 PROCEDURE — 99100 ANES PT EXTEME AGE<1 YR&>70: CPT | Performed by: STUDENT IN AN ORGANIZED HEALTH CARE EDUCATION/TRAINING PROGRAM

## 2024-03-22 PROCEDURE — 2500000005 HC RX 250 GENERAL PHARMACY W/O HCPCS

## 2024-03-22 PROCEDURE — 2500000004 HC RX 250 GENERAL PHARMACY W/ HCPCS (ALT 636 FOR OP/ED): Performed by: STUDENT IN AN ORGANIZED HEALTH CARE EDUCATION/TRAINING PROGRAM

## 2024-03-22 PROCEDURE — 96372 THER/PROPH/DIAG INJ SC/IM: CPT | Performed by: STUDENT IN AN ORGANIZED HEALTH CARE EDUCATION/TRAINING PROGRAM

## 2024-03-22 PROCEDURE — C1781 MESH (IMPLANTABLE): HCPCS | Performed by: SURGERY

## 2024-03-22 PROCEDURE — 96372 THER/PROPH/DIAG INJ SC/IM: CPT

## 2024-03-22 PROCEDURE — 2500000004 HC RX 250 GENERAL PHARMACY W/ HCPCS (ALT 636 FOR OP/ED): Performed by: SURGERY

## 2024-03-22 PROCEDURE — S0109 METHADONE ORAL 5MG: HCPCS

## 2024-03-22 PROCEDURE — S0109 METHADONE ORAL 5MG: HCPCS | Performed by: STUDENT IN AN ORGANIZED HEALTH CARE EDUCATION/TRAINING PROGRAM

## 2024-03-22 PROCEDURE — 1100000001 HC PRIVATE ROOM DAILY

## 2024-03-22 PROCEDURE — 3600000003 HC OR TIME - INITIAL BASE CHARGE - PROCEDURE LEVEL THREE: Performed by: SURGERY

## 2024-03-22 PROCEDURE — 7100000002 HC RECOVERY ROOM TIME - EACH INCREMENTAL 1 MINUTE: Performed by: SURGERY

## 2024-03-22 PROCEDURE — A4217 STERILE WATER/SALINE, 500 ML: HCPCS | Performed by: SURGERY

## 2024-03-22 PROCEDURE — 0WUF0JZ SUPPLEMENT ABDOMINAL WALL WITH SYNTHETIC SUBSTITUTE, OPEN APPROACH: ICD-10-PCS | Performed by: SURGERY

## 2024-03-22 PROCEDURE — 2500000002 HC RX 250 W HCPCS SELF ADMINISTERED DRUGS (ALT 637 FOR MEDICARE OP, ALT 636 FOR OP/ED)

## 2024-03-22 PROCEDURE — 7100000001 HC RECOVERY ROOM TIME - INITIAL BASE CHARGE: Performed by: SURGERY

## 2024-03-22 PROCEDURE — 49596 RPR AA HRN 1ST > 10 NCR/STRN: CPT | Performed by: SURGERY

## 2024-03-22 PROCEDURE — 2500000001 HC RX 250 WO HCPCS SELF ADMINISTERED DRUGS (ALT 637 FOR MEDICARE OP): Performed by: STUDENT IN AN ORGANIZED HEALTH CARE EDUCATION/TRAINING PROGRAM

## 2024-03-22 PROCEDURE — 0752T DGTZ GLS MCRSCP SLD LVL III: CPT | Mod: TC,SUR,WESLAB | Performed by: SURGERY

## 2024-03-22 PROCEDURE — 3700000002 HC GENERAL ANESTHESIA TIME - EACH INCREMENTAL 1 MINUTE: Performed by: SURGERY

## 2024-03-22 PROCEDURE — 3600000008 HC OR TIME - EACH INCREMENTAL 1 MINUTE - PROCEDURE LEVEL THREE: Performed by: SURGERY

## 2024-03-22 PROCEDURE — 2720000007 HC OR 272 NO HCPCS: Performed by: SURGERY

## 2024-03-22 PROCEDURE — 2500000002 HC RX 250 W HCPCS SELF ADMINISTERED DRUGS (ALT 637 FOR MEDICARE OP, ALT 636 FOR OP/ED): Performed by: STUDENT IN AN ORGANIZED HEALTH CARE EDUCATION/TRAINING PROGRAM

## 2024-03-22 PROCEDURE — 88304 TISSUE EXAM BY PATHOLOGIST: CPT | Performed by: PATHOLOGY

## 2024-03-22 PROCEDURE — 2780000003 HC OR 278 NO HCPCS: Performed by: SURGERY

## 2024-03-22 DEVICE — BARD SOFT MESH
Type: IMPLANTABLE DEVICE | Site: ABDOMEN | Status: FUNCTIONAL
Brand: BARD SOFT MESH

## 2024-03-22 RX ORDER — SODIUM CHLORIDE, SODIUM LACTATE, POTASSIUM CHLORIDE, CALCIUM CHLORIDE 600; 310; 30; 20 MG/100ML; MG/100ML; MG/100ML; MG/100ML
INJECTION, SOLUTION INTRAVENOUS CONTINUOUS PRN
Status: DISCONTINUED | OUTPATIENT
Start: 2024-03-22 | End: 2024-03-22

## 2024-03-22 RX ORDER — HEPARIN SODIUM 5000 [USP'U]/ML
5000 INJECTION, SOLUTION INTRAVENOUS; SUBCUTANEOUS ONCE
Status: DISCONTINUED | OUTPATIENT
Start: 2024-03-22 | End: 2024-03-22

## 2024-03-22 RX ORDER — SODIUM CHLORIDE 0.9 G/100ML
IRRIGANT IRRIGATION AS NEEDED
Status: DISCONTINUED | OUTPATIENT
Start: 2024-03-22 | End: 2024-03-22 | Stop reason: HOSPADM

## 2024-03-22 RX ORDER — ALBUMIN HUMAN 50 G/1000ML
SOLUTION INTRAVENOUS AS NEEDED
Status: DISCONTINUED | OUTPATIENT
Start: 2024-03-22 | End: 2024-03-22

## 2024-03-22 RX ORDER — ACETAMINOPHEN 325 MG/1
650 TABLET ORAL EVERY 4 HOURS PRN
Status: DISCONTINUED | OUTPATIENT
Start: 2024-03-22 | End: 2024-03-22 | Stop reason: HOSPADM

## 2024-03-22 RX ORDER — MIDAZOLAM HYDROCHLORIDE 1 MG/ML
INJECTION INTRAMUSCULAR; INTRAVENOUS AS NEEDED
Status: DISCONTINUED | OUTPATIENT
Start: 2024-03-22 | End: 2024-03-22

## 2024-03-22 RX ORDER — CEFAZOLIN 1 G/1
INJECTION, POWDER, FOR SOLUTION INTRAVENOUS AS NEEDED
Status: DISCONTINUED | OUTPATIENT
Start: 2024-03-22 | End: 2024-03-22

## 2024-03-22 RX ORDER — LABETALOL HYDROCHLORIDE 5 MG/ML
5 INJECTION, SOLUTION INTRAVENOUS ONCE AS NEEDED
Status: DISCONTINUED | OUTPATIENT
Start: 2024-03-22 | End: 2024-03-22 | Stop reason: HOSPADM

## 2024-03-22 RX ORDER — HYDROMORPHONE HYDROCHLORIDE 1 MG/ML
INJECTION, SOLUTION INTRAMUSCULAR; INTRAVENOUS; SUBCUTANEOUS AS NEEDED
Status: DISCONTINUED | OUTPATIENT
Start: 2024-03-22 | End: 2024-03-22

## 2024-03-22 RX ORDER — PROPOFOL 10 MG/ML
INJECTION, EMULSION INTRAVENOUS AS NEEDED
Status: DISCONTINUED | OUTPATIENT
Start: 2024-03-22 | End: 2024-03-22

## 2024-03-22 RX ORDER — LIDOCAINE HYDROCHLORIDE 20 MG/ML
INJECTION, SOLUTION INFILTRATION; PERINEURAL AS NEEDED
Status: DISCONTINUED | OUTPATIENT
Start: 2024-03-22 | End: 2024-03-22

## 2024-03-22 RX ORDER — ONDANSETRON 4 MG/1
4 TABLET, ORALLY DISINTEGRATING ORAL EVERY 8 HOURS PRN
Status: DISCONTINUED | OUTPATIENT
Start: 2024-03-22 | End: 2024-03-24

## 2024-03-22 RX ORDER — ACETAMINOPHEN 325 MG/1
650 TABLET ORAL EVERY 6 HOURS
Status: DISCONTINUED | OUTPATIENT
Start: 2024-03-22 | End: 2024-03-30 | Stop reason: HOSPADM

## 2024-03-22 RX ORDER — GABAPENTIN 600 MG/1
600 TABLET ORAL ONCE
Status: COMPLETED | OUTPATIENT
Start: 2024-03-22 | End: 2024-03-22

## 2024-03-22 RX ORDER — METHOCARBAMOL 100 MG/ML
1000 INJECTION, SOLUTION INTRAMUSCULAR; INTRAVENOUS ONCE
Status: DISCONTINUED | OUTPATIENT
Start: 2024-03-22 | End: 2024-03-22 | Stop reason: HOSPADM

## 2024-03-22 RX ORDER — ACETAMINOPHEN 325 MG/1
975 TABLET ORAL ONCE
Status: COMPLETED | OUTPATIENT
Start: 2024-03-22 | End: 2024-03-22

## 2024-03-22 RX ORDER — ACETAMINOPHEN 650 MG/1
650 SUPPOSITORY RECTAL EVERY 6 HOURS
Status: DISCONTINUED | OUTPATIENT
Start: 2024-03-22 | End: 2024-03-24

## 2024-03-22 RX ORDER — ONDANSETRON HYDROCHLORIDE 2 MG/ML
INJECTION, SOLUTION INTRAVENOUS AS NEEDED
Status: DISCONTINUED | OUTPATIENT
Start: 2024-03-22 | End: 2024-03-22

## 2024-03-22 RX ORDER — HYDROMORPHONE HCL/0.9% NACL/PF 15 MG/30ML
PATIENT CONTROLLED ANALGESIA SYRINGE INTRAVENOUS CONTINUOUS
Status: DISCONTINUED | OUTPATIENT
Start: 2024-03-22 | End: 2024-03-23

## 2024-03-22 RX ORDER — HEPARIN SODIUM 5000 [USP'U]/ML
5000 INJECTION, SOLUTION INTRAVENOUS; SUBCUTANEOUS ONCE
Status: COMPLETED | OUTPATIENT
Start: 2024-03-22 | End: 2024-03-22

## 2024-03-22 RX ORDER — NALOXONE HYDROCHLORIDE 0.4 MG/ML
0.2 INJECTION, SOLUTION INTRAMUSCULAR; INTRAVENOUS; SUBCUTANEOUS AS NEEDED
Status: DISCONTINUED | OUTPATIENT
Start: 2024-03-22 | End: 2024-03-30 | Stop reason: HOSPADM

## 2024-03-22 RX ORDER — TAMSULOSIN HYDROCHLORIDE 0.4 MG/1
0.4 CAPSULE ORAL DAILY
Status: DISCONTINUED | OUTPATIENT
Start: 2024-03-22 | End: 2024-03-30 | Stop reason: HOSPADM

## 2024-03-22 RX ORDER — SODIUM CHLORIDE, SODIUM LACTATE, POTASSIUM CHLORIDE, CALCIUM CHLORIDE 600; 310; 30; 20 MG/100ML; MG/100ML; MG/100ML; MG/100ML
100 INJECTION, SOLUTION INTRAVENOUS CONTINUOUS
Status: DISCONTINUED | OUTPATIENT
Start: 2024-03-22 | End: 2024-03-22 | Stop reason: HOSPADM

## 2024-03-22 RX ORDER — METHADONE HYDROCHLORIDE 10 MG/1
10 TABLET ORAL ONCE
Status: COMPLETED | OUTPATIENT
Start: 2024-03-22 | End: 2024-03-22

## 2024-03-22 RX ORDER — ROCURONIUM BROMIDE 10 MG/ML
INJECTION, SOLUTION INTRAVENOUS AS NEEDED
Status: DISCONTINUED | OUTPATIENT
Start: 2024-03-22 | End: 2024-03-22

## 2024-03-22 RX ORDER — GABAPENTIN 300 MG/1
300 CAPSULE ORAL 3 TIMES DAILY
Status: DISCONTINUED | OUTPATIENT
Start: 2024-03-22 | End: 2024-03-24

## 2024-03-22 RX ORDER — METHADONE HYDROCHLORIDE 5 MG/1
TABLET ORAL AS NEEDED
Status: DISCONTINUED | OUTPATIENT
Start: 2024-03-22 | End: 2024-03-22

## 2024-03-22 RX ORDER — HYDRALAZINE HYDROCHLORIDE 20 MG/ML
5 INJECTION INTRAMUSCULAR; INTRAVENOUS EVERY 30 MIN PRN
Status: DISCONTINUED | OUTPATIENT
Start: 2024-03-22 | End: 2024-03-22 | Stop reason: HOSPADM

## 2024-03-22 RX ORDER — ONDANSETRON HYDROCHLORIDE 2 MG/ML
4 INJECTION, SOLUTION INTRAVENOUS ONCE AS NEEDED
Status: COMPLETED | OUTPATIENT
Start: 2024-03-22 | End: 2024-03-22

## 2024-03-22 RX ORDER — ACETAMINOPHEN 160 MG/5ML
650 SOLUTION ORAL EVERY 6 HOURS
Status: DISCONTINUED | OUTPATIENT
Start: 2024-03-22 | End: 2024-03-24

## 2024-03-22 RX ORDER — METOCLOPRAMIDE HYDROCHLORIDE 5 MG/ML
10 INJECTION INTRAMUSCULAR; INTRAVENOUS ONCE AS NEEDED
Status: DISCONTINUED | OUTPATIENT
Start: 2024-03-22 | End: 2024-03-22 | Stop reason: HOSPADM

## 2024-03-22 RX ORDER — FENTANYL CITRATE 50 UG/ML
INJECTION, SOLUTION INTRAMUSCULAR; INTRAVENOUS AS NEEDED
Status: DISCONTINUED | OUTPATIENT
Start: 2024-03-22 | End: 2024-03-22

## 2024-03-22 RX ORDER — BUPIVACAINE HCL/EPINEPHRINE 0.5-1:200K
VIAL (ML) INJECTION AS NEEDED
Status: DISCONTINUED | OUTPATIENT
Start: 2024-03-22 | End: 2024-03-22 | Stop reason: HOSPADM

## 2024-03-22 RX ORDER — LIDOCAINE HYDROCHLORIDE 10 MG/ML
0.1 INJECTION, SOLUTION EPIDURAL; INFILTRATION; INTRACAUDAL; PERINEURAL ONCE
Status: DISCONTINUED | OUTPATIENT
Start: 2024-03-22 | End: 2024-03-22 | Stop reason: HOSPADM

## 2024-03-22 RX ORDER — METHOCARBAMOL 100 MG/ML
1000 INJECTION, SOLUTION INTRAMUSCULAR; INTRAVENOUS EVERY 8 HOURS
Status: DISCONTINUED | OUTPATIENT
Start: 2024-03-22 | End: 2024-03-24

## 2024-03-22 RX ORDER — ENOXAPARIN SODIUM 100 MG/ML
40 INJECTION SUBCUTANEOUS EVERY 24 HOURS
Status: DISCONTINUED | OUTPATIENT
Start: 2024-03-22 | End: 2024-03-30 | Stop reason: HOSPADM

## 2024-03-22 RX ORDER — ONDANSETRON HYDROCHLORIDE 2 MG/ML
4 INJECTION, SOLUTION INTRAVENOUS EVERY 8 HOURS PRN
Status: DISCONTINUED | OUTPATIENT
Start: 2024-03-22 | End: 2024-03-30 | Stop reason: HOSPADM

## 2024-03-22 RX ORDER — PHENYLEPHRINE HCL IN 0.9% NACL 0.4MG/10ML
SYRINGE (ML) INTRAVENOUS AS NEEDED
Status: DISCONTINUED | OUTPATIENT
Start: 2024-03-22 | End: 2024-03-22

## 2024-03-22 RX ORDER — SODIUM CHLORIDE, SODIUM LACTATE, POTASSIUM CHLORIDE, CALCIUM CHLORIDE 600; 310; 30; 20 MG/100ML; MG/100ML; MG/100ML; MG/100ML
100 INJECTION, SOLUTION INTRAVENOUS CONTINUOUS
Status: DISCONTINUED | OUTPATIENT
Start: 2024-03-22 | End: 2024-03-23

## 2024-03-22 RX ADMIN — PROPOFOL 140 MG: 10 INJECTION, EMULSION INTRAVENOUS at 11:27

## 2024-03-22 RX ADMIN — SUGAMMADEX 400 MG: 100 INJECTION, SOLUTION INTRAVENOUS at 15:06

## 2024-03-22 RX ADMIN — ACETAMINOPHEN 650 MG: 325 TABLET ORAL at 20:44

## 2024-03-22 RX ADMIN — ROCURONIUM BROMIDE 10 MG: 10 INJECTION INTRAVENOUS at 14:35

## 2024-03-22 RX ADMIN — LIDOCAINE HYDROCHLORIDE 100 MG: 20 INJECTION, SOLUTION INFILTRATION; PERINEURAL at 11:27

## 2024-03-22 RX ADMIN — ONDANSETRON 4 MG: 2 INJECTION INTRAMUSCULAR; INTRAVENOUS at 16:38

## 2024-03-22 RX ADMIN — FENTANYL CITRATE 50 MCG: 50 INJECTION, SOLUTION INTRAMUSCULAR; INTRAVENOUS at 11:27

## 2024-03-22 RX ADMIN — TAMSULOSIN HYDROCHLORIDE 0.4 MG: 0.4 CAPSULE ORAL at 20:44

## 2024-03-22 RX ADMIN — GABAPENTIN 300 MG: 300 CAPSULE ORAL at 20:44

## 2024-03-22 RX ADMIN — CEFAZOLIN 2 G: 330 INJECTION, POWDER, FOR SOLUTION INTRAMUSCULAR; INTRAVENOUS at 11:32

## 2024-03-22 RX ADMIN — Medication 20 MG: at 14:41

## 2024-03-22 RX ADMIN — ROCURONIUM BROMIDE 60 MG: 10 INJECTION INTRAVENOUS at 11:28

## 2024-03-22 RX ADMIN — PROPOFOL 30 MG: 10 INJECTION, EMULSION INTRAVENOUS at 12:19

## 2024-03-22 RX ADMIN — ROCURONIUM BROMIDE 30 MG: 10 INJECTION INTRAVENOUS at 12:01

## 2024-03-22 RX ADMIN — METHADONE HYDROCHLORIDE 10 MG: 5 TABLET ORAL at 10:41

## 2024-03-22 RX ADMIN — ACETAMINOPHEN 975 MG: 325 TABLET ORAL at 10:43

## 2024-03-22 RX ADMIN — Medication: at 16:22

## 2024-03-22 RX ADMIN — Medication 120 MCG: at 13:59

## 2024-03-22 RX ADMIN — MIDAZOLAM HYDROCHLORIDE 2 MG: 1 INJECTION, SOLUTION INTRAMUSCULAR; INTRAVENOUS at 11:17

## 2024-03-22 RX ADMIN — PROPOFOL 30 MG: 10 INJECTION, EMULSION INTRAVENOUS at 11:28

## 2024-03-22 RX ADMIN — HYDROMORPHONE HYDROCHLORIDE 0.4 MG: 1 INJECTION, SOLUTION INTRAMUSCULAR; INTRAVENOUS; SUBCUTANEOUS at 15:01

## 2024-03-22 RX ADMIN — Medication 30 MG: at 13:45

## 2024-03-22 RX ADMIN — HEPARIN SODIUM 5000 UNITS: 5000 INJECTION INTRAVENOUS; SUBCUTANEOUS at 11:13

## 2024-03-22 RX ADMIN — ONDANSETRON 4 MG: 2 INJECTION INTRAMUSCULAR; INTRAVENOUS at 15:03

## 2024-03-22 RX ADMIN — GABAPENTIN 600 MG: 300 CAPSULE ORAL at 10:43

## 2024-03-22 RX ADMIN — DEXAMETHASONE SODIUM PHOSPHATE 4 MG: 4 INJECTION, SOLUTION INTRA-ARTICULAR; INTRALESIONAL; INTRAMUSCULAR; INTRAVENOUS; SOFT TISSUE at 11:27

## 2024-03-22 RX ADMIN — ALBUMIN (HUMAN) 250 ML: 12.5 SOLUTION INTRAVENOUS at 14:20

## 2024-03-22 RX ADMIN — SODIUM CHLORIDE, POTASSIUM CHLORIDE, SODIUM LACTATE AND CALCIUM CHLORIDE: 600; 310; 30; 20 INJECTION, SOLUTION INTRAVENOUS at 11:20

## 2024-03-22 RX ADMIN — ROCURONIUM BROMIDE 10 MG: 10 INJECTION INTRAVENOUS at 14:01

## 2024-03-22 RX ADMIN — METHADONE HYDROCHLORIDE 10 MG: 5 TABLET ORAL at 10:43

## 2024-03-22 RX ADMIN — SODIUM CHLORIDE, SODIUM LACTATE, POTASSIUM CHLORIDE, CALCIUM CHLORIDE: 600; 310; 30; 20 INJECTION, SOLUTION INTRAVENOUS at 11:41

## 2024-03-22 RX ADMIN — Medication 160 MCG: at 11:55

## 2024-03-22 RX ADMIN — METHOCARBAMOL 1000 MG: 100 INJECTION, SOLUTION INTRAMUSCULAR; INTRAVENOUS at 22:20

## 2024-03-22 RX ADMIN — ROCURONIUM BROMIDE 30 MG: 10 INJECTION INTRAVENOUS at 12:42

## 2024-03-22 SDOH — SOCIAL STABILITY: SOCIAL INSECURITY: ARE THERE ANY APPARENT SIGNS OF INJURIES/BEHAVIORS THAT COULD BE RELATED TO ABUSE/NEGLECT?: NO

## 2024-03-22 SDOH — SOCIAL STABILITY: SOCIAL INSECURITY: ABUSE: ADULT

## 2024-03-22 SDOH — SOCIAL STABILITY: SOCIAL INSECURITY: ARE YOU OR HAVE YOU BEEN THREATENED OR ABUSED PHYSICALLY, EMOTIONALLY, OR SEXUALLY BY ANYONE?: NO

## 2024-03-22 SDOH — SOCIAL STABILITY: SOCIAL INSECURITY: HAS ANYONE EVER THREATENED TO HURT YOUR FAMILY OR YOUR PETS?: NO

## 2024-03-22 SDOH — SOCIAL STABILITY: SOCIAL INSECURITY: HAVE YOU HAD THOUGHTS OF HARMING ANYONE ELSE?: NO

## 2024-03-22 SDOH — SOCIAL STABILITY: SOCIAL INSECURITY: DO YOU FEEL ANYONE HAS EXPLOITED OR TAKEN ADVANTAGE OF YOU FINANCIALLY OR OF YOUR PERSONAL PROPERTY?: NO

## 2024-03-22 SDOH — SOCIAL STABILITY: SOCIAL INSECURITY: WERE YOU ABLE TO COMPLETE ALL THE BEHAVIORAL HEALTH SCREENINGS?: YES

## 2024-03-22 SDOH — SOCIAL STABILITY: SOCIAL INSECURITY: DOES ANYONE TRY TO KEEP YOU FROM HAVING/CONTACTING OTHER FRIENDS OR DOING THINGS OUTSIDE YOUR HOME?: NO

## 2024-03-22 SDOH — SOCIAL STABILITY: SOCIAL INSECURITY: DO YOU FEEL UNSAFE GOING BACK TO THE PLACE WHERE YOU ARE LIVING?: NO

## 2024-03-22 ASSESSMENT — PAIN - FUNCTIONAL ASSESSMENT
PAIN_FUNCTIONAL_ASSESSMENT: UNABLE TO SELF-REPORT
PAIN_FUNCTIONAL_ASSESSMENT: UNABLE TO SELF-REPORT
PAIN_FUNCTIONAL_ASSESSMENT: 0-10
PAIN_FUNCTIONAL_ASSESSMENT: UNABLE TO SELF-REPORT
PAIN_FUNCTIONAL_ASSESSMENT: UNABLE TO SELF-REPORT
PAIN_FUNCTIONAL_ASSESSMENT: 0-10

## 2024-03-22 ASSESSMENT — PAIN SCALES - GENERAL
PAINLEVEL_OUTOF10: 0 - NO PAIN
PAINLEVEL_OUTOF10: 2
PAIN_LEVEL: 4
PAINLEVEL_OUTOF10: 5 - MODERATE PAIN
PAINLEVEL_OUTOF10: 4
PAINLEVEL_OUTOF10: 3
PAINLEVEL_OUTOF10: 2
PAINLEVEL_OUTOF10: 2
PAINLEVEL_OUTOF10: 4

## 2024-03-22 ASSESSMENT — COGNITIVE AND FUNCTIONAL STATUS - GENERAL
PERSONAL GROOMING: A LITTLE
PATIENT BASELINE BEDBOUND: NO
CLIMB 3 TO 5 STEPS WITH RAILING: A LITTLE
MOVING TO AND FROM BED TO CHAIR: A LITTLE
DAILY ACTIVITIY SCORE: 19
DRESSING REGULAR LOWER BODY CLOTHING: A LITTLE
HELP NEEDED FOR BATHING: A LITTLE
TOILETING: A LITTLE
WALKING IN HOSPITAL ROOM: A LITTLE
DRESSING REGULAR UPPER BODY CLOTHING: A LITTLE
TURNING FROM BACK TO SIDE WHILE IN FLAT BAD: A LITTLE
STANDING UP FROM CHAIR USING ARMS: A LITTLE
MOVING FROM LYING ON BACK TO SITTING ON SIDE OF FLAT BED WITH BEDRAILS: A LITTLE
MOBILITY SCORE: 18

## 2024-03-22 ASSESSMENT — ACTIVITIES OF DAILY LIVING (ADL)
HEARING - RIGHT EAR: FUNCTIONAL
PATIENT'S MEMORY ADEQUATE TO SAFELY COMPLETE DAILY ACTIVITIES?: YES
DRESSING YOURSELF: INDEPENDENT
LACK_OF_TRANSPORTATION: NO
BATHING: INDEPENDENT
ADEQUATE_TO_COMPLETE_ADL: YES
GROOMING: INDEPENDENT
FEEDING YOURSELF: INDEPENDENT
TOILETING: INDEPENDENT
WALKS IN HOME: INDEPENDENT
HEARING - LEFT EAR: FUNCTIONAL
JUDGMENT_ADEQUATE_SAFELY_COMPLETE_DAILY_ACTIVITIES: YES

## 2024-03-22 ASSESSMENT — PAIN SCALES - WONG BAKER: WONGBAKER_NUMERICALRESPONSE: NO HURT

## 2024-03-22 ASSESSMENT — LIFESTYLE VARIABLES
SKIP TO QUESTIONS 9-10: 1
HOW OFTEN DO YOU HAVE 6 OR MORE DRINKS ON ONE OCCASION: NEVER
AUDIT-C TOTAL SCORE: 0
HOW OFTEN DO YOU HAVE A DRINK CONTAINING ALCOHOL: NEVER
AUDIT-C TOTAL SCORE: 0
HOW MANY STANDARD DRINKS CONTAINING ALCOHOL DO YOU HAVE ON A TYPICAL DAY: PATIENT DOES NOT DRINK

## 2024-03-22 ASSESSMENT — COLUMBIA-SUICIDE SEVERITY RATING SCALE - C-SSRS
2. HAVE YOU ACTUALLY HAD ANY THOUGHTS OF KILLING YOURSELF?: NO
6. HAVE YOU EVER DONE ANYTHING, STARTED TO DO ANYTHING, OR PREPARED TO DO ANYTHING TO END YOUR LIFE?: NO
1. IN THE PAST MONTH, HAVE YOU WISHED YOU WERE DEAD OR WISHED YOU COULD GO TO SLEEP AND NOT WAKE UP?: NO

## 2024-03-22 ASSESSMENT — PAIN SCALES - PAIN ASSESSMENT IN ADVANCED DEMENTIA (PAINAD)
BODYLANGUAGE: RELAXED
CONSOLABILITY: NO NEED TO CONSOLE

## 2024-03-22 ASSESSMENT — PAIN DESCRIPTION - LOCATION: LOCATION: ABDOMEN

## 2024-03-22 ASSESSMENT — PATIENT HEALTH QUESTIONNAIRE - PHQ9
SUM OF ALL RESPONSES TO PHQ9 QUESTIONS 1 & 2: 0
1. LITTLE INTEREST OR PLEASURE IN DOING THINGS: NOT AT ALL
2. FEELING DOWN, DEPRESSED OR HOPELESS: NOT AT ALL

## 2024-03-22 ASSESSMENT — PAIN DESCRIPTION - ORIENTATION: ORIENTATION: MID

## 2024-03-22 NOTE — INTERVAL H&P NOTE
H&P reviewed. The patient was examined and there are no changes to the H&P.    Jovanny Yap is a 73 y.o. male here for incisional hernia repair with component separation and mesh

## 2024-03-22 NOTE — ANESTHESIA PROCEDURE NOTES
Peripheral IV  Date/Time: 3/22/2024 12:01 PM      Placement  Needle size: 18 G  Laterality: right  Location: hand  Site prep: alcohol  Technique: anatomical landmarks  Attempts: 3  Difficult Venous Access: Yes  Unsuccessful Attempt Location(s): right hand and left hand

## 2024-03-22 NOTE — SIGNIFICANT EVENT
Post op check    S: sitting in bed, comfortable. Tolerating clear liquids    O:  Visit Vitals  /79 (BP Location: Right arm, Patient Position: Lying)   Pulse 85   Temp 36.6 °C (97.9 °F) (Temporal)   Resp 18    General: no acute distress  Cards: normal rate  Pulm: unlabored  Abd: drains serosang. Midline dressing with shadowing. Abd soft and appropriately TTP.  Norman: dark yellow urine    A/P: recovering appropriately. Continue current management plan.

## 2024-03-22 NOTE — ANESTHESIA PROCEDURE NOTES
Peripheral IV  Date/Time: 3/22/2024 12:01 PM      Placement  Needle size: 20 G  Laterality: left  Location: wrist  Technique: anatomical landmarks  Attempts: 2

## 2024-03-22 NOTE — ANESTHESIA PROCEDURE NOTES
Airway  Date/Time: 3/22/2024 11:33 AM  Urgency: elective      Staffing  Performed: resident   Authorized by: González Johnson DO    Performed by: Steve Dee MD  Patient location during procedure: OR    Indications and Patient Condition  Indications for airway management: anesthesia  Spontaneous Ventilation: absent  Sedation level: deep  Preoxygenated: yes  Patient position: sniffing  Mask difficulty assessment: 1 - vent by mask  Planned trial extubation    Final Airway Details  Final airway type: endotracheal airway      Successful airway: ETT  Cuffed: yes   Successful intubation technique: direct laryngoscopy  Facilitating devices/methods: intubating stylet and cricoid pressure  Endotracheal tube insertion site: oral  Blade: Sue  Blade size: #4  ETT size (mm): 7.5  Cormack-Lehane Classification: grade I - full view of glottis  Placement verified by: capnometry   Inital cuff pressure (cm H2O): 9  Measured from: lips  ETT to lips (cm): 22  Number of attempts at approach: 2  Ventilation between attempts: none    Additional Comments  First attempt by PGY1 resident, grade I-IIa view obtained, ETT not advanced thru cords  Second attempt by attending Dr. Johnson, grade I view, successful passed ETT thru cords

## 2024-03-22 NOTE — ANESTHESIA POSTPROCEDURE EVALUATION
Patient: Jovanny Yap    Procedure Summary       Date: 03/22/24 Room / Location: [facility] OR 01 / [facility]    Anesthesia Start: 1119 Anesthesia Stop: 1541    Procedure: Exploratory Laparotomy , lysis of adhesions, open repair of incisional hernia with mesh, Myofacial release x2, TAP Block, and excision of abdominal scar (Abdomen) Diagnosis:       Incisional hernia, without obstruction or gangrene      (Incisional hernia, without obstruction or gangrene [K43.2])    Surgeons: Hany Espino MD Responsible Provider: González Johnson DO    Anesthesia Type: general ASA Status: 3            Anesthesia Type: general    Vitals Value Taken Time   /74 03/22/24 1601   Temp 36.4 °C (97.5 °F) 03/22/24 1533   Pulse 81 03/22/24 1608   Resp 11 03/22/24 1608   SpO2 100 % 03/22/24 1608   Vitals shown include unvalidated device data.    Anesthesia Post Evaluation    Patient location during evaluation: bedside  Patient participation: complete - patient participated  Level of consciousness: awake and alert  Pain score: 4  Pain management: adequate  Airway patency: patent  Cardiovascular status: acceptable and hemodynamically stable  Respiratory status: acceptable and face mask  Hydration status: acceptable  Postoperative Nausea and Vomiting: none        No notable events documented.

## 2024-03-22 NOTE — ANESTHESIA PREPROCEDURE EVALUATION
Patient: Jovanny Yap    Procedure Information       Date/Time: 03/22/24 1115    Procedure: Repair Ventral Hernia (Abdomen)    Location: [facility] OR 01 / [facility] OR    Surgeons: Hany Espino MD            Relevant Problems   Anesthesia (within normal limits)      Cardiovascular   (+) Benign essential hypertension   (+) Chest pain   (+) Hyperlipidemia      GI   (+) GERD (gastroesophageal reflux disease)      /Renal   (+) Acute kidney injury (CMS/HCC)   (+) Fatty liver   (+) Kidney lesion   (+) Renal cyst   (+) Renal oncocytoma      Neuro/Psych   (+) Anxiety disorder      GI/Hepatic   (+) Fatty liver      Musculoskeletal   (+) Chronic low back pain      Eyes, Ears, Nose, and Throat   (+) Bilateral sensorineural hearing loss       Clinical information reviewed:   Tobacco  Allergies    Med Hx  Surg Hx   Fam Hx  Soc Hx        NPO Detail:  NPO/Void Status  Date of Last Liquid: 03/21/24  Time of Last Liquid: 0700  Date of Last Solid: 03/21/24  Time of Last Solid: 1900  Last Intake Type: Clear fluids         Physical Exam    Airway  Mallampati: II  TM distance: >3 FB  Neck ROM: full     Cardiovascular - normal exam     Dental - normal exam     Pulmonary - normal exam  Breath sounds clear to auscultation     Abdominal - normal exam             Anesthesia Plan    History of general anesthesia?: yes  History of complications of general anesthesia?: no    ASA 3     general     intravenous induction   Postoperative administration of opioids is intended.  Trial extubation is planned.  Anesthetic plan and risks discussed with patient.    Plan discussed with resident.

## 2024-03-23 LAB
ALBUMIN SERPL BCP-MCNC: 3.7 G/DL (ref 3.4–5)
ANION GAP SERPL CALC-SCNC: 13 MMOL/L (ref 10–20)
BUN SERPL-MCNC: 25 MG/DL (ref 6–23)
CALCIUM SERPL-MCNC: 8.8 MG/DL (ref 8.6–10.6)
CHLORIDE SERPL-SCNC: 104 MMOL/L (ref 98–107)
CO2 SERPL-SCNC: 25 MMOL/L (ref 21–32)
CREAT SERPL-MCNC: 1.2 MG/DL (ref 0.5–1.3)
EGFRCR SERPLBLD CKD-EPI 2021: 64 ML/MIN/1.73M*2
ERYTHROCYTE [DISTWIDTH] IN BLOOD BY AUTOMATED COUNT: 13.2 % (ref 11.5–14.5)
GLUCOSE SERPL-MCNC: 150 MG/DL (ref 74–99)
HCT VFR BLD AUTO: 40.5 % (ref 41–52)
HGB BLD-MCNC: 13.9 G/DL (ref 13.5–17.5)
MAGNESIUM SERPL-MCNC: 2.01 MG/DL (ref 1.6–2.4)
MCH RBC QN AUTO: 33.2 PG (ref 26–34)
MCHC RBC AUTO-ENTMCNC: 34.3 G/DL (ref 32–36)
MCV RBC AUTO: 97 FL (ref 80–100)
NRBC BLD-RTO: 0 /100 WBCS (ref 0–0)
PHOSPHATE SERPL-MCNC: 3.6 MG/DL (ref 2.5–4.9)
PLATELET # BLD AUTO: 160 X10*3/UL (ref 150–450)
POTASSIUM SERPL-SCNC: 4.4 MMOL/L (ref 3.5–5.3)
RBC # BLD AUTO: 4.19 X10*6/UL (ref 4.5–5.9)
SODIUM SERPL-SCNC: 138 MMOL/L (ref 136–145)
WBC # BLD AUTO: 11.4 X10*3/UL (ref 4.4–11.3)

## 2024-03-23 PROCEDURE — 85027 COMPLETE CBC AUTOMATED: CPT | Performed by: STUDENT IN AN ORGANIZED HEALTH CARE EDUCATION/TRAINING PROGRAM

## 2024-03-23 PROCEDURE — 2500000004 HC RX 250 GENERAL PHARMACY W/ HCPCS (ALT 636 FOR OP/ED)

## 2024-03-23 PROCEDURE — 2500000001 HC RX 250 WO HCPCS SELF ADMINISTERED DRUGS (ALT 637 FOR MEDICARE OP): Performed by: STUDENT IN AN ORGANIZED HEALTH CARE EDUCATION/TRAINING PROGRAM

## 2024-03-23 PROCEDURE — 80069 RENAL FUNCTION PANEL: CPT | Performed by: STUDENT IN AN ORGANIZED HEALTH CARE EDUCATION/TRAINING PROGRAM

## 2024-03-23 PROCEDURE — 2500000001 HC RX 250 WO HCPCS SELF ADMINISTERED DRUGS (ALT 637 FOR MEDICARE OP)

## 2024-03-23 PROCEDURE — 2500000004 HC RX 250 GENERAL PHARMACY W/ HCPCS (ALT 636 FOR OP/ED): Performed by: STUDENT IN AN ORGANIZED HEALTH CARE EDUCATION/TRAINING PROGRAM

## 2024-03-23 PROCEDURE — 2500000002 HC RX 250 W HCPCS SELF ADMINISTERED DRUGS (ALT 637 FOR MEDICARE OP, ALT 636 FOR OP/ED)

## 2024-03-23 PROCEDURE — 36415 COLL VENOUS BLD VENIPUNCTURE: CPT | Performed by: STUDENT IN AN ORGANIZED HEALTH CARE EDUCATION/TRAINING PROGRAM

## 2024-03-23 PROCEDURE — 83735 ASSAY OF MAGNESIUM: CPT | Performed by: STUDENT IN AN ORGANIZED HEALTH CARE EDUCATION/TRAINING PROGRAM

## 2024-03-23 PROCEDURE — 1100000001 HC PRIVATE ROOM DAILY

## 2024-03-23 RX ORDER — SIMVASTATIN 20 MG/1
20 TABLET, FILM COATED ORAL NIGHTLY
Status: DISCONTINUED | OUTPATIENT
Start: 2024-03-23 | End: 2024-03-30 | Stop reason: HOSPADM

## 2024-03-23 RX ORDER — OXYCODONE HYDROCHLORIDE 5 MG/1
5 TABLET ORAL EVERY 6 HOURS PRN
Status: DISCONTINUED | OUTPATIENT
Start: 2024-03-23 | End: 2024-03-26

## 2024-03-23 RX ORDER — HYDROMORPHONE HYDROCHLORIDE 1 MG/ML
0.2 INJECTION, SOLUTION INTRAMUSCULAR; INTRAVENOUS; SUBCUTANEOUS EVERY 4 HOURS PRN
Status: DISCONTINUED | OUTPATIENT
Start: 2024-03-23 | End: 2024-03-24

## 2024-03-23 RX ORDER — ASPIRIN 81 MG/1
81 TABLET ORAL DAILY
Status: DISCONTINUED | OUTPATIENT
Start: 2024-03-23 | End: 2024-03-30 | Stop reason: HOSPADM

## 2024-03-23 RX ORDER — OXYCODONE HYDROCHLORIDE 5 MG/1
10 TABLET ORAL EVERY 6 HOURS PRN
Status: DISCONTINUED | OUTPATIENT
Start: 2024-03-23 | End: 2024-03-26

## 2024-03-23 RX ORDER — ESCITALOPRAM OXALATE 5 MG/1
5 TABLET ORAL DAILY
Status: DISCONTINUED | OUTPATIENT
Start: 2024-03-23 | End: 2024-03-30 | Stop reason: HOSPADM

## 2024-03-23 RX ORDER — SODIUM CHLORIDE, SODIUM LACTATE, POTASSIUM CHLORIDE, CALCIUM CHLORIDE 600; 310; 30; 20 MG/100ML; MG/100ML; MG/100ML; MG/100ML
50 INJECTION, SOLUTION INTRAVENOUS CONTINUOUS
Status: DISCONTINUED | OUTPATIENT
Start: 2024-03-23 | End: 2024-03-25

## 2024-03-23 RX ADMIN — ASPIRIN 81 MG: 81 TABLET, COATED ORAL at 09:30

## 2024-03-23 RX ADMIN — METHOCARBAMOL 1000 MG: 100 INJECTION, SOLUTION INTRAMUSCULAR; INTRAVENOUS at 05:58

## 2024-03-23 RX ADMIN — METHOCARBAMOL 1000 MG: 100 INJECTION, SOLUTION INTRAMUSCULAR; INTRAVENOUS at 21:33

## 2024-03-23 RX ADMIN — TAMSULOSIN HYDROCHLORIDE 0.4 MG: 0.4 CAPSULE ORAL at 09:30

## 2024-03-23 RX ADMIN — GABAPENTIN 300 MG: 300 CAPSULE ORAL at 21:33

## 2024-03-23 RX ADMIN — ESCITALOPRAM 5 MG: 5 TABLET, FILM COATED ORAL at 09:31

## 2024-03-23 RX ADMIN — METHOCARBAMOL 1000 MG: 100 INJECTION, SOLUTION INTRAMUSCULAR; INTRAVENOUS at 14:30

## 2024-03-23 RX ADMIN — ACETAMINOPHEN 650 MG: 325 TABLET ORAL at 14:46

## 2024-03-23 RX ADMIN — ACETAMINOPHEN 650 MG: 325 TABLET ORAL at 09:30

## 2024-03-23 RX ADMIN — ENOXAPARIN SODIUM 40 MG: 100 INJECTION SUBCUTANEOUS at 14:45

## 2024-03-23 RX ADMIN — SODIUM CHLORIDE, POTASSIUM CHLORIDE, SODIUM LACTATE AND CALCIUM CHLORIDE 50 ML/HR: 600; 310; 30; 20 INJECTION, SOLUTION INTRAVENOUS at 09:32

## 2024-03-23 RX ADMIN — GABAPENTIN 300 MG: 300 CAPSULE ORAL at 14:46

## 2024-03-23 RX ADMIN — ACETAMINOPHEN 650 MG: 325 TABLET ORAL at 01:45

## 2024-03-23 RX ADMIN — ONDANSETRON 4 MG: 2 INJECTION INTRAMUSCULAR; INTRAVENOUS at 21:33

## 2024-03-23 RX ADMIN — METHOCARBAMOL 1000 MG: 100 INJECTION, SOLUTION INTRAMUSCULAR; INTRAVENOUS at 14:45

## 2024-03-23 RX ADMIN — ACETAMINOPHEN 650 MG: 325 TABLET ORAL at 21:48

## 2024-03-23 RX ADMIN — GABAPENTIN 300 MG: 300 CAPSULE ORAL at 09:30

## 2024-03-23 RX ADMIN — HYDROMORPHONE HYDROCHLORIDE 0.2 MG: 1 INJECTION, SOLUTION INTRAMUSCULAR; INTRAVENOUS; SUBCUTANEOUS at 21:47

## 2024-03-23 RX ADMIN — SIMVASTATIN 20 MG: 20 TABLET, FILM COATED ORAL at 21:33

## 2024-03-23 ASSESSMENT — COGNITIVE AND FUNCTIONAL STATUS - GENERAL
STANDING UP FROM CHAIR USING ARMS: A LITTLE
MOBILITY SCORE: 11
DRESSING REGULAR LOWER BODY CLOTHING: A LITTLE
DRESSING REGULAR UPPER BODY CLOTHING: A LITTLE
MOVING FROM LYING ON BACK TO SITTING ON SIDE OF FLAT BED WITH BEDRAILS: A LOT
WALKING IN HOSPITAL ROOM: A LITTLE
DAILY ACTIVITIY SCORE: 18
MOVING FROM LYING ON BACK TO SITTING ON SIDE OF FLAT BED WITH BEDRAILS: A LITTLE
DRESSING REGULAR UPPER BODY CLOTHING: A LITTLE
CLIMB 3 TO 5 STEPS WITH RAILING: A LITTLE
DRESSING REGULAR UPPER BODY CLOTHING: A LITTLE
WALKING IN HOSPITAL ROOM: A LOT
TOILETING: A LOT
TOILETING: A LITTLE
DAILY ACTIVITIY SCORE: 15
EATING MEALS: A LITTLE
MOBILITY SCORE: 18
DRESSING REGULAR LOWER BODY CLOTHING: A LITTLE
HELP NEEDED FOR BATHING: A LITTLE
TURNING FROM BACK TO SIDE WHILE IN FLAT BAD: A LITTLE
TURNING FROM BACK TO SIDE WHILE IN FLAT BAD: A LOT
TURNING FROM BACK TO SIDE WHILE IN FLAT BAD: A LITTLE
CLIMB 3 TO 5 STEPS WITH RAILING: A LITTLE
STANDING UP FROM CHAIR USING ARMS: A LOT
DRESSING REGULAR LOWER BODY CLOTHING: TOTAL
HELP NEEDED FOR BATHING: TOTAL
MOVING FROM LYING ON BACK TO SITTING ON SIDE OF FLAT BED WITH BEDRAILS: A LITTLE
CLIMB 3 TO 5 STEPS WITH RAILING: TOTAL
WALKING IN HOSPITAL ROOM: A LITTLE
MOVING TO AND FROM BED TO CHAIR: A LITTLE
EATING MEALS: A LITTLE
MOVING TO AND FROM BED TO CHAIR: A LOT
STANDING UP FROM CHAIR USING ARMS: A LITTLE
MOVING TO AND FROM BED TO CHAIR: A LITTLE
PERSONAL GROOMING: A LITTLE
PERSONAL GROOMING: A LITTLE
TOILETING: A LITTLE
HELP NEEDED FOR BATHING: A LITTLE

## 2024-03-23 ASSESSMENT — PAIN - FUNCTIONAL ASSESSMENT
PAIN_FUNCTIONAL_ASSESSMENT: 0-10
PAIN_FUNCTIONAL_ASSESSMENT: 0-10

## 2024-03-23 ASSESSMENT — PAIN SCALES - GENERAL: PAINLEVEL_OUTOF10: 8

## 2024-03-23 ASSESSMENT — PAIN DESCRIPTION - DESCRIPTORS: DESCRIPTORS: SHARP

## 2024-03-23 NOTE — CARE PLAN
The patient's goals for the shift include hemodynamic stability    The clinical goals for the shift include get through it

## 2024-03-23 NOTE — PROGRESS NOTES
Jovanny Yap is a 73 y.o. male on day 1 of admission presenting with Incisional hernia.    Subjective   No acute events overnight. Patient reported his pain is well-controlled, minimal requirements from PCA. Had some jello and ice-cream last night, tolerated appropriately without any nausea. Voiding adequately. Saturating appropriately on 3L NC.        Objective     Physical Exam:  Constitutional: NAD, resting in bed comfortably   Respiratory: unlabored breathing on 3L NC  Cardiovascular: nontachycardic  Abdomen: soft, minimally distended, appropriately tender to palpation around incision sites; island dressing intact over midline incision with some strikethrough, LLQ and RLQ NATIVIDAD drain intact with serosanguineous output  : bird in place, draining clear yellow urine  MSK: Normal ROM     Last Recorded Vitals  Blood pressure 122/68, pulse 70, temperature 36.4 °C (97.5 °F), temperature source Temporal, resp. rate 18, height 1.829 m (6'), weight 125 kg (276 lb 0.3 oz), SpO2 94 %.  Intake/Output last 3 Shifts:  I/O last 3 completed shifts:  In: 2700 (21.6 mL/kg) [I.V.:2700 (21.6 mL/kg)]  Out: 1020 (8.1 mL/kg) [Urine:750 (0.2 mL/kg/hr); Drains:270]  Weight: 125.2 kg     Relevant Results  No results found for this or any previous visit (from the past 24 hour(s)).        Assessment/Plan   Principal Problem:    Incisional hernia  Active Problems:    Incisional hernia of anterior abdominal wall without obstruction or gangrene    Patient is a 72 yo male with PMH HTN, HLD, BPH with prior history of emergent R. Hemicolectomy for colonic perforation after colonoscopy, with multiple subsequent hospital admission for SBO. Patient is now POD1 s/p ex-lap, DOMONIQUE with open repair of incisional hernia with mesh, myofacial release x 2, and excision of abdominal scar. Patient recovering well post-operatively.     Plan:   Neuro:   - Discontinue PCA  - transition to oral tylenol, oxy 5, oxy 10  - robaxin q8  - dilaudid breakthrough   -  restart home escitalopram    Pulm:   - wean O2 as tolerated  - incentive spirometry     Cardiovascular:   - restart home aspirin  - restart home simvastatin    Renal:  - strict I/O's  - maintain bird, removal tonight at midnight  - replete electrolytes prn; K: >4, Mg >2     GI:  - continue CLD  - trend output from NATIVIDAD drain     Heme:   - follow up CBC  - SCD's  - lovenox for DVT ppx    MSK:  - PT/OT consult  - encourage OOB/ambulation     D/w attending surgeon Dr. Espino.       Galdino Morales MD  [facility]   [phone]

## 2024-03-23 NOTE — PROGRESS NOTES
Pharmacy Admission Order Reconciliation Review    Jovanny Yap is a 73 y.o. male admitted for Incisional hernia. Pharmacy reviewed the patient's unreconciled admission medications.    Prior to admission medications that were reviewed and acted on by the pharmacist include:  Aspirin 81 mg  Hibiclines 4% external  Lexapro 5 mg  Periodex 0.12% sol  Vit B-12 1,000 mg  Simvastatin 20 mg  Tamsulosin 0.4 mg  These medications have been reconciled.     Any other unreconcilied medications have been addressed and will be ordered or held by the patient's medical team. Medications addressed by the pharmacist may be added or changed by the patient's medical team at any time.    Megan Nichole, Sheila  Transitions of Care Pharmacist  [facility]  Please reach out via Secure Chat for questions, or if no response call [phone]

## 2024-03-24 ENCOUNTER — APPOINTMENT (OUTPATIENT)
Dept: RADIOLOGY | Facility: HOSPITAL | Age: 74
DRG: 354 | End: 2024-03-24
Payer: MEDICARE

## 2024-03-24 LAB
ALBUMIN SERPL BCP-MCNC: 3.6 G/DL (ref 3.4–5)
ANION GAP SERPL CALC-SCNC: 12 MMOL/L (ref 10–20)
BUN SERPL-MCNC: 20 MG/DL (ref 6–23)
CALCIUM SERPL-MCNC: 8.6 MG/DL (ref 8.6–10.6)
CHLORIDE SERPL-SCNC: 104 MMOL/L (ref 98–107)
CO2 SERPL-SCNC: 25 MMOL/L (ref 21–32)
CREAT SERPL-MCNC: 1.02 MG/DL (ref 0.5–1.3)
EGFRCR SERPLBLD CKD-EPI 2021: 78 ML/MIN/1.73M*2
ERYTHROCYTE [DISTWIDTH] IN BLOOD BY AUTOMATED COUNT: 13.2 % (ref 11.5–14.5)
GLUCOSE SERPL-MCNC: 125 MG/DL (ref 74–99)
HCT VFR BLD AUTO: 37.4 % (ref 41–52)
HGB BLD-MCNC: 12.7 G/DL (ref 13.5–17.5)
MAGNESIUM SERPL-MCNC: 2.19 MG/DL (ref 1.6–2.4)
MCH RBC QN AUTO: 32.3 PG (ref 26–34)
MCHC RBC AUTO-ENTMCNC: 34 G/DL (ref 32–36)
MCV RBC AUTO: 95 FL (ref 80–100)
NRBC BLD-RTO: 0 /100 WBCS (ref 0–0)
PHOSPHATE SERPL-MCNC: 2.3 MG/DL (ref 2.5–4.9)
PLATELET # BLD AUTO: 115 X10*3/UL (ref 150–450)
POTASSIUM SERPL-SCNC: 4.3 MMOL/L (ref 3.5–5.3)
RBC # BLD AUTO: 3.93 X10*6/UL (ref 4.5–5.9)
SODIUM SERPL-SCNC: 137 MMOL/L (ref 136–145)
WBC # BLD AUTO: 8.8 X10*3/UL (ref 4.4–11.3)

## 2024-03-24 PROCEDURE — 70450 CT HEAD/BRAIN W/O DYE: CPT | Performed by: RADIOLOGY

## 2024-03-24 PROCEDURE — 2500000002 HC RX 250 W HCPCS SELF ADMINISTERED DRUGS (ALT 637 FOR MEDICARE OP, ALT 636 FOR OP/ED)

## 2024-03-24 PROCEDURE — 2500000004 HC RX 250 GENERAL PHARMACY W/ HCPCS (ALT 636 FOR OP/ED)

## 2024-03-24 PROCEDURE — 2500000001 HC RX 250 WO HCPCS SELF ADMINISTERED DRUGS (ALT 637 FOR MEDICARE OP): Performed by: STUDENT IN AN ORGANIZED HEALTH CARE EDUCATION/TRAINING PROGRAM

## 2024-03-24 PROCEDURE — 2500000001 HC RX 250 WO HCPCS SELF ADMINISTERED DRUGS (ALT 637 FOR MEDICARE OP)

## 2024-03-24 PROCEDURE — 2500000004 HC RX 250 GENERAL PHARMACY W/ HCPCS (ALT 636 FOR OP/ED): Performed by: STUDENT IN AN ORGANIZED HEALTH CARE EDUCATION/TRAINING PROGRAM

## 2024-03-24 PROCEDURE — 97530 THERAPEUTIC ACTIVITIES: CPT | Mod: GP

## 2024-03-24 PROCEDURE — 36415 COLL VENOUS BLD VENIPUNCTURE: CPT

## 2024-03-24 PROCEDURE — 97161 PT EVAL LOW COMPLEX 20 MIN: CPT | Mod: GP

## 2024-03-24 PROCEDURE — 83735 ASSAY OF MAGNESIUM: CPT

## 2024-03-24 PROCEDURE — 1100000001 HC PRIVATE ROOM DAILY

## 2024-03-24 PROCEDURE — 70450 CT HEAD/BRAIN W/O DYE: CPT

## 2024-03-24 PROCEDURE — 80069 RENAL FUNCTION PANEL: CPT

## 2024-03-24 PROCEDURE — 85027 COMPLETE CBC AUTOMATED: CPT

## 2024-03-24 RX ORDER — METHOCARBAMOL 500 MG/1
500 TABLET, FILM COATED ORAL EVERY 8 HOURS SCHEDULED
Status: DISCONTINUED | OUTPATIENT
Start: 2024-03-24 | End: 2024-03-26

## 2024-03-24 RX ORDER — GABAPENTIN 100 MG/1
100 CAPSULE ORAL 3 TIMES DAILY
Status: DISCONTINUED | OUTPATIENT
Start: 2024-03-25 | End: 2024-03-25

## 2024-03-24 RX ADMIN — SODIUM CHLORIDE, POTASSIUM CHLORIDE, SODIUM LACTATE AND CALCIUM CHLORIDE 100 ML/HR: 600; 310; 30; 20 INJECTION, SOLUTION INTRAVENOUS at 21:38

## 2024-03-24 RX ADMIN — ACETAMINOPHEN 650 MG: 325 TABLET ORAL at 08:05

## 2024-03-24 RX ADMIN — ACETAMINOPHEN 650 MG: 325 TABLET ORAL at 14:17

## 2024-03-24 RX ADMIN — GABAPENTIN 300 MG: 300 CAPSULE ORAL at 08:05

## 2024-03-24 RX ADMIN — ACETAMINOPHEN 650 MG: 325 TABLET ORAL at 22:07

## 2024-03-24 RX ADMIN — METHOCARBAMOL 500 MG: 500 TABLET ORAL at 06:38

## 2024-03-24 RX ADMIN — GABAPENTIN 300 MG: 300 CAPSULE ORAL at 14:17

## 2024-03-24 RX ADMIN — SODIUM CHLORIDE, POTASSIUM CHLORIDE, SODIUM LACTATE AND CALCIUM CHLORIDE 50 ML/HR: 600; 310; 30; 20 INJECTION, SOLUTION INTRAVENOUS at 02:46

## 2024-03-24 RX ADMIN — SIMVASTATIN 20 MG: 20 TABLET, FILM COATED ORAL at 22:07

## 2024-03-24 RX ADMIN — ENOXAPARIN SODIUM 40 MG: 100 INJECTION SUBCUTANEOUS at 14:17

## 2024-03-24 RX ADMIN — TAMSULOSIN HYDROCHLORIDE 0.4 MG: 0.4 CAPSULE ORAL at 08:05

## 2024-03-24 RX ADMIN — ESCITALOPRAM 5 MG: 5 TABLET, FILM COATED ORAL at 08:06

## 2024-03-24 RX ADMIN — ASPIRIN 81 MG: 81 TABLET, COATED ORAL at 08:05

## 2024-03-24 RX ADMIN — GABAPENTIN 300 MG: 300 CAPSULE ORAL at 22:07

## 2024-03-24 ASSESSMENT — COGNITIVE AND FUNCTIONAL STATUS - GENERAL
WALKING IN HOSPITAL ROOM: A LOT
MOBILITY SCORE: 18
WALKING IN HOSPITAL ROOM: A LITTLE
STANDING UP FROM CHAIR USING ARMS: A LOT
CLIMB 3 TO 5 STEPS WITH RAILING: A LITTLE
STANDING UP FROM CHAIR USING ARMS: A LITTLE
MOBILITY SCORE: 18
STANDING UP FROM CHAIR USING ARMS: A LITTLE
HELP NEEDED FOR BATHING: A LITTLE
DRESSING REGULAR LOWER BODY CLOTHING: A LITTLE
HELP NEEDED FOR BATHING: TOTAL
MOVING FROM LYING ON BACK TO SITTING ON SIDE OF FLAT BED WITH BEDRAILS: A LOT
MOVING FROM LYING ON BACK TO SITTING ON SIDE OF FLAT BED WITH BEDRAILS: A LOT
DRESSING REGULAR UPPER BODY CLOTHING: A LITTLE
MOVING TO AND FROM BED TO CHAIR: A LITTLE
HELP NEEDED FOR BATHING: A LITTLE
DRESSING REGULAR LOWER BODY CLOTHING: A LITTLE
MOBILITY SCORE: 11
TURNING FROM BACK TO SIDE WHILE IN FLAT BAD: A LITTLE
TURNING FROM BACK TO SIDE WHILE IN FLAT BAD: A LITTLE
TURNING FROM BACK TO SIDE WHILE IN FLAT BAD: A LOT
DAILY ACTIVITIY SCORE: 18
DAILY ACTIVITIY SCORE: 18
TOILETING: A LITTLE
MOVING TO AND FROM BED TO CHAIR: A LITTLE
MOVING TO AND FROM BED TO CHAIR: A LOT
MOVING FROM LYING ON BACK TO SITTING ON SIDE OF FLAT BED WITH BEDRAILS: A LITTLE
WALKING IN HOSPITAL ROOM: A LOT
TOILETING: A LITTLE
DRESSING REGULAR UPPER BODY CLOTHING: A LITTLE
DAILY ACTIVITIY SCORE: 15
CLIMB 3 TO 5 STEPS WITH RAILING: A LOT
MOVING TO AND FROM BED TO CHAIR: A LOT
CLIMB 3 TO 5 STEPS WITH RAILING: TOTAL
TURNING FROM BACK TO SIDE WHILE IN FLAT BAD: A LOT
DRESSING REGULAR UPPER BODY CLOTHING: A LITTLE
EATING MEALS: A LITTLE
EATING MEALS: A LITTLE
CLIMB 3 TO 5 STEPS WITH RAILING: A LITTLE
MOVING FROM LYING ON BACK TO SITTING ON SIDE OF FLAT BED WITH BEDRAILS: A LITTLE
MOBILITY SCORE: 12
DRESSING REGULAR LOWER BODY CLOTHING: TOTAL
WALKING IN HOSPITAL ROOM: A LITTLE
STANDING UP FROM CHAIR USING ARMS: A LOT
TOILETING: A LOT
PERSONAL GROOMING: A LITTLE
PERSONAL GROOMING: A LITTLE

## 2024-03-24 ASSESSMENT — PAIN SCALES - GENERAL
PAINLEVEL_OUTOF10: 7
PAINLEVEL_OUTOF10: 0 - NO PAIN

## 2024-03-24 ASSESSMENT — ACTIVITIES OF DAILY LIVING (ADL): ADL_ASSISTANCE: INDEPENDENT

## 2024-03-24 ASSESSMENT — PAIN - FUNCTIONAL ASSESSMENT: PAIN_FUNCTIONAL_ASSESSMENT: 0-10

## 2024-03-24 NOTE — CARE PLAN
The patient's goals for the shift include hemodynamic stability    Problem: Safety - Adult  Goal: Free from fall injury  Outcome: Not Progressing     Problem: Pain - Adult  Goal: Verbalizes/displays adequate comfort level or baseline comfort level  Outcome: Progressing     Problem: Discharge Planning  Goal: Discharge to home or other facility with appropriate resources  Outcome: Progressing     Problem: Chronic Conditions and Co-morbidities  Goal: Patient's chronic conditions and co-morbidity symptoms are monitored and maintained or improved  Outcome: Progressing     The clinical goals for the shift include Hemodynamic stability throughout the shift ending 3/24/24 @ 0700 hrs.    Pt has been experiencing dizzy spells while out of bed. I notified the team (Álvaro) and was told that we are to monitor the pt at this time. Pt did do PT for the first time post surgery. The pt did ok, he has a dizzy spell during the assessment.   Problem: Safety - Adult  Goal: Free from fall injury  Outcome: Progressing

## 2024-03-24 NOTE — PROGRESS NOTES
Physical Therapy    Physical Therapy Evaluation & Treatment    Patient Name: Jovanny Yap  MRN: [id number]  Today's Date: 3/24/2024   Time Calculation  Start Time: 1038  Stop Time: 1109  Time Calculation (min): 31 min    Assessment/Plan   PT Assessment  PT Assessment Results: Decreased endurance, Impaired balance, Decreased mobility  Rehab Prognosis: Good  Barriers to Discharge: safety awareness, confusion, pain  Evaluation/Treatment Tolerance: Patient limited by pain  Strengths: Attitude of self, Support of Caregivers  Barriers to Participation: Insight into problems  End of Session Communication: Bedside nurse  Assessment Comment: Pt confused and drowsy during session. Pt moderately impulsive and with decreased safety awareness. Pt with difficulty following commands and required increased time and repetion to follow one step commands. Pt mod A to ambulate 20ft x2 with WW. Pt dizzy during ambulation and stating his head felt fuzzy - BP stable. Pt with 1 LOB when returning to the chair requiring max A to correct and safely sit. Pt with increased unsteadiness when turning and required max vc for correct AD use. Patient would benefit from skilled physical therapy to maximize functional mobility and safety. Pt remains appropriate for high intensity therapy when medically appropriate for DC to maximize [place].   End of Session Patient Position: Bed, 3 rail up, Alarm off, not on at start of session (Cb in reach; wife and MD present; Per RN alarm can be off)   IP OR SWING BED PT PLAN  Inpatient or Swing Bed: Inpatient  PT Plan  Treatment/Interventions: Bed mobility, Transfer training, Gait training, Balance training, Strengthening, Endurance training, Therapeutic exercise, Therapeutic activity  PT Plan: Skilled PT  PT Frequency: 5 times per week  PT Discharge Recommendations: High intensity level of continued care  Equipment Recommended upon Discharge:  (to be determined)  PT Recommended Transfer Status: Assist x2,  Assistive device (WW)  PT - OK to Discharge: Yes (meaning pt seen and DC rec made)    Subjective   General Visit Information:  General  Reason for Referral: s/p ex-lap, DOMONIQUE with open repair of incisional hernia with mesh, myofacial release x 2, and excision of abdominal scar 3/22  Past Medical History Relevant to Rehab: HTN, HLD, BPH with prior history of emergent R. Hemicolectomy for colonic perforation after colonoscopy, with multiple subsequent hospital admission for SBO  Family/Caregiver Present: Yes  Caregiver Feedback: wife present and supportive  Prior to Session Communication: Bedside nurse  Patient Position Received: Up in chair, Alarm off, not on at start of session  Preferred Learning Style: verbal  General Comment: Pt pleasant and agreeable to therapy.  Home Living:  Home Living  Type of Home: House  Lives With: Spouse  Home Adaptive Equipment: Walker rolling or standard, Wheelchair-manual  Home Layout: One level  Home Access: Ramped entrance (garage entrance is ramp and has 2 stairs on the front of house)  Bathroom Shower/Tub: Walk-in shower  Bathroom Equipment: Grab bars in shower, Shower chair with back  Prior Level of Function:  Prior Function Per Pt/Caregiver Report  Level of [place]: Independent with ADLs and functional transfers, Independent with homemaking with ambulation  Receives Help From: Family  ADL Assistance: Independent  Homemaking Assistance: Independent  Ambulatory Assistance: Independent  Prior Function Comments: Pt was IND in all ADLs and IADLs prior to admit; used no AD for ambulation  Precautions:  Precautions  Medical Precautions: Abdominal precautions, Fall precautions  Post-Surgical Precautions: Abdominal surgery precautions  Vital Signs:  Vital Signs  Heart Rate:  (during 83; post 82)  Heart Rate Source: Monitor  SpO2:  (during 96; post 95)  BP:  (during 151/90; post  154/78)  BP Location: Right arm  BP Method: Automatic  Patient Position: Sitting    Objective   Pain:  Pain  Assessment  Pain Assessment: 0-10  Pain Score: 7  Pain Type: Acute pain, Surgical pain  Pain Location: Abdomen  Pain Interventions: Repositioned, Ambulation/increased activity  Response to Interventions: pt stated no change  Cognition:  Cognition  Overall Cognitive Status: Impaired (pt confused)  Arousal/Alertness: Delayed responses to stimuli  Orientation Level: Oriented X4  Following Commands: Follows one step commands with repetition  Safety/Judgement: Exceptions to WFL  Insight: Moderate  Impulsive: Moderately    General Assessments:  Activity Tolerance  Endurance: Tolerates 10 - 20 min exercise with multiple rests    Sensation  Light Touch: No apparent deficits     Coordination  Movements are Fluid and Coordinated: No    Static Sitting Balance  Static Sitting-Balance Support: Bilateral upper extremity supported, Feet supported  Static Sitting-Level of Assistance: Contact guard    Static Standing Balance  Static Standing-Balance Support: Bilateral upper extremity supported  Static Standing-Level of Assistance: Moderate assistance  Static Standing-Comment/Number of Minutes: WW  Functional Assessments:  Bed Mobility  Bed Mobility: Yes  Bed Mobility 1  Bed Mobility 1: Sitting to supine  Level of Assistance 1: Maximum assistance, Maximum verbal cues  Bed Mobility Comments 1: Rn present for safety; Max vc for sequencing; when asked to go onto right forearm pt going down onto his left    Transfers  Transfer: Yes  Transfer 1  Transfer From 1: Sit to, Stand to  Transfer to 1: Stand, Sit  Technique 1: Sit to stand, Stand to sit  Transfer Device 1: Walker  Transfer Level of Assistance 1: Moderate assistance, Maximum verbal cues  Trials/Comments 1: 3 trials; max vc for safety, AD use, and sequencing  (2 trials apart of treatment)    Ambulation/Gait Training  Ambulation/Gait Training Performed: Yes  Ambulation/Gait Training 1  Surface 1: Level tile  Device 1: Rolling walker  Assistance 1: Moderate assistance, Maximum verbal  cues  Quality of Gait 1: Wide base of support, Inconsistent stride length, Decreased step length, Forward flexed posture (decreased alana, unsteady)  Comments/Distance (ft) 1: 20ft x 2 with seated rest break; max vc for safety, sequencing, and AD use; pt dizzy during ambulation - BP stable  Extremity/Trunk Assessments:  RLE   RLE : Within Functional Limits  LLE   LLE : Within Functional Limits  Treatments:  Therapeutic Activity  Therapeutic Activity Performed: Yes  Therapeutic Activity 1: static stand with WW and mod A x 3 min  Therapeutic Activity 2: static sitting x 3 min with CGA    Transfers 2  Transfer From 2: Chair with arms to  Transfer to 2: Bed  Technique 2: Stand pivot  Transfer Device 2: Walker  Transfer Level of Assistance 2: Maximum verbal cues, Moderate assistance  Trials/Comments 2: RN present for safety; max vc for safety,  sequencing, and AD use  Outcome Measures:  [facility] Basic Mobility  Turning from your back to your side while in a flat bed without using bedrails: A lot  Moving from lying on your back to sitting on the side of a flat bed without using bedrails: A lot  Moving to and from bed to chair (including a wheelchair): A lot  Standing up from a chair using your arms (e.g. wheelchair or bedside chair): A lot  To walk in hospital room: A lot  Climbing 3-5 steps with railing: Total  Basic Mobility - Total Score: 11    Encounter Problems       Encounter Problems (Active)       PT Problem       Patient will complete supine to sit and sit to supine Independent        Start:  03/24/24    Expected End:  04/07/24            Patient will perform sit<>stand transfer with Rolling Walker, and Modified Independent        Start:  03/24/24    Expected End:  04/07/24            Patient will ambulate >150' with Rolling Walker and Modified Independent        Start:  03/24/24    Expected End:  04/07/24            Patient will verbalize and demonstrate abdominal precautions independently.        Start:   03/24/24    Expected End:  04/07/24            Pt able to demo static standing IND with LRAD x 5 min       Start:  03/24/24    Expected End:  04/07/24               Pain - Adult              Education Documentation  Precautions, taught by Tracey Ferrera PT at 3/24/2024 11:36 AM.  Learner: Patient  Readiness: Acceptance  Method: Explanation, Demonstration  Response: Needs Reinforcement    Body Mechanics, taught by Tracey Ferrera PT at 3/24/2024 11:36 AM.  Learner: Patient  Readiness: Acceptance  Method: Explanation, Demonstration  Response: Needs Reinforcement    Mobility Training, taught by Tracey Ferrear PT at 3/24/2024 11:36 AM.  Learner: Patient  Readiness: Acceptance  Method: Explanation, Demonstration  Response: Needs Reinforcement    Education Comments  No comments found.

## 2024-03-24 NOTE — CONSULTS
Neurology Team Consult     Chief Complaint: Elective admission for abdominal hernia repair   Primary Team: General surgery   Reason For Consult: Post-op bilateral arm shaking     History of Present Illness:   Mr. Yap is a 73 y.o. right handed man who is admitted on 3/22 for elective abdominal hernia repair, now s/p repair on Friday 3/22 with no complications. Neurology consulted for bilateral arm shaking.     Mr. Yap is a known case of HTN, HLD, anxiety, and BPH, he was admitted on Friday 3/22 for elective abdominal hernia repair with no complication. He was transferred to the floor, on PCA for pain. Morning of 3/23, he was noted to have myoclonus of both arms, and he was mildly confused about times and dates. As per wife who was at bedside, she reports no history of slurred speech, or weakness. He was discontinued of the PCA on 3/23, and kept on PRN Oxycodone and Dilaudid. As per family, his jerking although persisted, is much improved today compared to yesterday.     Of note, patient was also started on Gabapentin 300 mg TID (not a home medication), along with home Escitalopram 5mg (home dose). His labs are with normal CrCl and SrCr at 1.2.     Relevant past medical, surgical, family, and social histories, along with ROS was reviewed and pertinent details noted above.     Past Medical History:   Diagnosis Date    MAT (acute kidney injury) (CMS/Formerly [facility])     Anxiety     BPH (benign prostatic hyperplasia)     taking tamsulosin    Colon polyp     DVT (deep venous thrombosis) (CMS/Formerly [facility]) 08/03/2023    provoked after 8H road trip    Fatty liver     GERD (gastroesophageal reflux disease)     F/W PCP- taking protonix    HL (hearing loss)     b/l    Hyperlipidemia     F/W cards- taking simvastatin    Hypertension     F/W cards- takes lisinopril    Incisional hernia     Renal cyst     Renal lesion 04/2023    s/p biopsy    Small bowel obstruction (CMS/HCC) 08/21/2023    Vision loss     wears glasses     Past Surgical  History:   Procedure Laterality Date    COLONOSCOPY  01/10/2024    EYE SURGERY      Eye Surgery    HEMICOLECTOMY Right     RENAL BIOPSY  04/2023    TONSILLECTOMY       Family History   Problem Relation Name Age of Onset    Stroke Mother      Coronary artery disease Father       Social History     Tobacco Use    Smoking status: Never    Smokeless tobacco: Never   Substance Use Topics    Alcohol use: Not Currently      No Known Allergies     Medications:    Current Facility-Administered Medications:     acetaminophen (Tylenol) tablet 650 mg, 650 mg, oral, q6h, 650 mg at 03/24/24 1417 **OR** [DISCONTINUED] acetaminophen (Tylenol) oral liquid 650 mg, 650 mg, nasogastric tube, q6h **OR** [DISCONTINUED] acetaminophen (Tylenol) suppository 650 mg, 650 mg, rectal, q6h, Rajendra Pugh MD    aspirin EC tablet 81 mg, 81 mg, oral, Daily, Galdino Morales MD, 81 mg at 03/24/24 0805    enoxaparin (Lovenox) syringe 40 mg, 40 mg, subcutaneous, q24h, Rajendra Pugh MD, 40 mg at 03/24/24 1417    escitalopram (Lexapro) tablet 5 mg, 5 mg, oral, Daily, Galdino Morales MD, 5 mg at 03/24/24 0806    gabapentin (Neurontin) capsule 300 mg, 300 mg, oral, TID, Rajendra Pugh MD, 300 mg at 03/24/24 1417    lactated Ringer's infusion, 100 mL/hr, intravenous, Continuous, Isatu Sorensen MD, Last Rate: 100 mL/hr at 03/24/24 1043, 100 mL/hr at 03/24/24 1043    [Held by provider] methocarbamol (Robaxin) tablet 500 mg, 500 mg, oral, q8h AMADOU, Vincent Chowdhury MD, 500 mg at 03/24/24 0638    naloxone (Narcan) injection 0.2 mg, 0.2 mg, intravenous, PRN, Rajendra Pugh MD    [DISCONTINUED] ondansetron ODT (Zofran-ODT) disintegrating tablet 4 mg, 4 mg, oral, q8h PRN **OR** ondansetron (Zofran) injection 4 mg, 4 mg, intravenous, q8h PRN, Rajendra Pugh MD, 4 mg at 03/23/24 2133    [Held by provider] oxyCODONE (Roxicodone) immediate release tablet 10 mg, 10 mg, oral, q6h PRN, Galdino Morales MD    [Held by provider] oxyCODONE  (Roxicodone) immediate release tablet 5 mg, 5 mg, oral, q6h PRN, Galdino Morales MD    simvastatin (Zocor) tablet 20 mg, 20 mg, oral, Nightly, Galdino Morales MD, 20 mg at 03/23/24 2133    tamsulosin (Flomax) 24 hr capsule 0.4 mg, 0.4 mg, oral, Daily, Galdino Morales MD, 0.4 mg at 03/24/24 0805       Physical Exam:  /81 (BP Location: Right arm, Patient Position: Lying)   Pulse 87   Temp 36.3 °C (97.3 °F) (Temporal)   Resp 18   Ht 1.829 m (6')   Wt 123 kg (271 lb 13.2 oz)   SpO2 95%   BMI 36.87 kg/m²      General Appearance:  No distress, alert, interactive and cooperative.     Cardiovascular: Regular, rate and rhythm    Neurological:  Mental status: Alert, oriented to place and person, to time with choices.   Able to follow simple but not complex commands.     Cranial Nerves:  CN 2   Visual fields full to blink to threat   VA hard to assess properly with his AMS (  CN 3, 4, 6   Pupils round, 3 mm in diameter, equally reactive to light.   Lids symmetric; no ptosis.   Left eye esotropia. (Baseline as per wife)  CN 5   Facial sensation intact bilaterally.   CN 7   Normal and symmetric facial strength. Nasolabial folds symmetric.   Able to lift eyebrows and close eye lids with eye lashes buried symmetrically.   CN 8   Hearing intact to conversation.     CN 9, 10   Palate elevates symmetrically.   Phonation within normal limits, no dysarthria.   CN 11   Normal strength of shoulder shrug and neck turning.   CN 12   Tongue midline, with normal bulk and strength; no fasciculations.     Motor:   Muscle bulk: Normal throughout.  Muscle tone: Normal in both upper and lower extremities.  No drift.   Negative myoclonus noted on both upper extremities.     R  L   5  5 Shoulder abduction  5  5 Elbow flexion  5  5 Elbow extension  5  5 Hand      Lower extremities strength exam was limited due to confusion, but he was antigravity on hip flexion and knee extension, and able to wiggle both toes.      Reflexes:                       R          L  BR:               2          2  Biceps:         2          2  Knee:           1          1  Ankle:          0          0    Babinski: Toes are down going  No clonus or other pathological reflexes    Sensory:   In both upper and lower extremities, sensation was intact to light touch    Coordination:    In both upper extremities, finger-nose-finger was intact without dysmetria or overshoot.     Gait:   Deferred due to fall risk.        Results:    The following labs, imaging, and results were personally reviewed and demonstrated:     Latest Reference Range & Units Most Recent   WBC 4.4 - 11.3 x10*3/uL 8.8  3/24/24 04:40   nRBC 0.0 - 0.0 /100 WBCs 0.0  3/24/24 04:40   RBC 4.50 - 5.90 x10*6/uL 3.93 (L)  3/24/24 04:40   HEMOGLOBIN 13.5 - 17.5 g/dL 12.7 (L)  3/24/24 04:40   HEMATOCRIT 41.0 - 52.0 % 37.4 (L)  3/24/24 04:40   MCV 80 - 100 fL 95  3/24/24 04:40   MCH 26.0 - 34.0 pg 32.3  3/24/24 04:40   MCHC 32.0 - 36.0 g/dL 34.0  3/24/24 04:40   RED CELL DISTRIBUTION WIDTH 11.5 - 14.5 % 13.2  3/24/24 04:40   Platelets 150 - 450 x10*3/uL 115 (L)  3/24/24 04:40   MEAN PLATELET VOLUME 7.5 - 11.5 fL 10.4  10/24/23 04:32   Neutrophils % 40.0 - 80.0 % 79.8  1/19/24 17:35   Immature Granulocytes %, Automated 0.0 - 0.9 % 0.2  1/19/24 17:35   Lymphocytes % 13.0 - 44.0 % 12.2  1/19/24 17:35   Monocytes % 2.0 - 10.0 % 6.7  1/19/24 17:35   Eosinophils % 0.0 - 6.0 % 0.8  1/19/24 17:35      Latest Reference Range & Units Most Recent   GLUCOSE 74 - 99 mg/dL 125 (H)  3/24/24 04:40   SODIUM 136 - 145 mmol/L 137  3/24/24 04:40   POTASSIUM 3.5 - 5.3 mmol/L 4.3  3/24/24 04:40   CHLORIDE 98 - 107 mmol/L 104  3/24/24 04:40   Bicarbonate 21 - 32 mmol/L 25  3/24/24 04:40   Anion Gap 10 - 20 mmol/L 12  3/24/24 04:40   Blood Urea Nitrogen 6 - 23 mg/dL 20  3/24/24 04:40   Creatinine 0.50 - 1.30 mg/dL 1.02  3/24/24 04:40   EGFR >60 mL/min/1.73m*2 78  3/24/24 04:40   Calcium 8.6 - 10.6 mg/dL 8.6  3/24/24  04:40   PHOSPHORUS 2.5 - 4.9 mg/dL 2.3 (L)  3/24/24 04:40   Albumin 3.4 - 5.0 g/dL 3.6  3/24/24 04:40   Alkaline Phosphatase 33 - 136 U/L 52  1/22/24 05:29   ALT 10 - 52 U/L 20  1/22/24 05:29   AST 9 - 39 U/L 20  1/22/24 05:29   Bilirubin Total 0.0 - 1.2 mg/dL 0.7  1/22/24 05:29       Impression/Plan:    A 73 year old, right-handed, man who was admitted for elective hernia repair. He was noted to have negative myoclonus post-op while on opioids and initiation of gabapentin. Exam otherwise is negative for any focal neurological deficits, apart from chronic left eye esotropia in the setting of sever vision loss in that eye as per family. CTH was obtained by primary team and personally reviewed, which was negative for any acute changes.     Impression: Negative myoclonus, like 2ry to opioids and gabapentin.       Plan:  - Try to limit pain medications as tolerated to avoid falls and injuries 2ry to myoclonus  - If able, would decreased his dose of Gabapentin to 100 mg TID   - Fall risk precautions     Will staff with general neurology attending tomorrow morning.     Please page with any further questions or concerns.   General neurology team pager: [phone]    Marshall Renteria MD  Neurology Resident PGY3

## 2024-03-24 NOTE — PROGRESS NOTES
Jovanny Yap is a 73 y.o. male on day 2 of admission presenting with Incisional hernia.    Subjective   Patient's bird removed at 1:30 last night, still waiting to void. Patient slept through pain medications last night. Expresses that he feels hungry. Tolerated clears. States that he is passing gas. Denies f/c/n/v, chest pain or SOB.     Objective     Physical Exam:  Constitutional: NAD, resting in bed comfortably   Respiratory: unlabored breathing on 2L NC  Cardiovascular: nontachycardic  Abdomen: soft, minimally distended, appropriately tender to palpation around incision sites; island dressing intact over midline incision with some strikethrough, LLQ and RLQ NATIVIDAD drain intact with serosanguineous output  : bird removed   MSK: Normal ROM     Last Recorded Vitals  Blood pressure 136/82, pulse 73, temperature 36.2 °C (97.2 °F), temperature source Temporal, resp. rate 18, height 1.829 m (6'), weight 125 kg (276 lb 0.3 oz), SpO2 97 %.  Intake/Output last 3 Shifts:  I/O last 3 completed shifts:  In: 2940 (23.5 mL/kg) [P.O.:240; I.V.:2700 (21.6 mL/kg)]  Out: 1890 (15.1 mL/kg) [Urine:1475 (0.3 mL/kg/hr); Drains:415]  Weight: 125.2 kg     Relevant Results  Results for orders placed or performed during the hospital encounter of 03/22/24 (from the past 24 hour(s))   Renal Function Panel   Result Value Ref Range    Glucose 125 (H) 74 - 99 mg/dL    Sodium 137 136 - 145 mmol/L    Potassium 4.3 3.5 - 5.3 mmol/L    Chloride 104 98 - 107 mmol/L    Bicarbonate 25 21 - 32 mmol/L    Anion Gap 12 10 - 20 mmol/L    Urea Nitrogen 20 6 - 23 mg/dL    Creatinine 1.02 0.50 - 1.30 mg/dL    eGFR 78 >60 mL/min/1.73m*2    Calcium 8.6 8.6 - 10.6 mg/dL    Phosphorus 2.3 (L) 2.5 - 4.9 mg/dL    Albumin 3.6 3.4 - 5.0 g/dL   Magnesium   Result Value Ref Range    Magnesium 2.19 1.60 - 2.40 mg/dL           Assessment/Plan   Principal Problem:    Incisional hernia  Active Problems:    Incisional hernia of anterior abdominal wall without  obstruction or gangrene    Patient is a 72 yo male with PMH HTN, HLD, BPH with prior history of emergent R. Hemicolectomy for colonic perforation after colonoscopy, with multiple subsequent hospital admission for SBO. Patient is now POD2 s/p ex-lap, DOMONIQUE with open repair of incisional hernia with mesh, myofacial release x 2, and excision of abdominal scar. Patient recovering well post-operatively.     Plan:   Neuro:   - Discontinue PCA  - transition to oral tylenol, oxy 5, oxy 10  - po robaxin PRN   - dilaudid breakthrough   - restart home escitalopram    Pulm:   - wean O2 as tolerated  - incentive spirometry     Cardiovascular:   - restart home aspirin  - restart home simvastatin    Renal:  - strict I/O's  - maintain bird, removal tonight at midnight  - replete electrolytes prn; K: >4, Mg >2     GI:  - start soft diet   - trend output from NATIVIDAD drain     Heme:   - follow up CBC  - SCD's  - lovenox for DVT ppx    MSK:  - PT/OT consult  - encourage OOB/ambulation     D/w attending surgeon Dr. Espino.       Galdino Morales MD  [facility]   [phone]

## 2024-03-24 NOTE — SIGNIFICANT EVENT
Altered mental status/difficulties with proprioception:    Patient is a 73-year-old male who is postop day 2 from an open incisional hernia repair with mesh.  I was called at roughly 10 AM by nursing that patient was experiencing dizziness with ambulation.    On arrival to evaluate patient, he was seated in room the chair.  He was conversational, and knew he had had surgery and had some understanding that he was having difficulties with dizziness and word finding.  Intermittently he would become tangential and was having frequent difficulty with word finding that was frustrating to him.  His daughter who was bedside, reported her father had had similar symptoms during a prior hospitalization for which psychiatry was engaged.     Exam:  Patient is in no acute distress.  On 2L NC.   Abdomen with abdominal binder in place, 2 surgical drains with serosanguineous output, appropriately tender  Cranial nerves intact bilaterally  Sensation and strength intact in bilateral upper and lower extremities  Patient appeared to have difficulty localizing his finger to pulse oximetry monitor as nurse attempted to place it.  On further exam he is able to complete finger-to-nose test, but with some difficulty bilaterally with the wavering of his hand throughout the air. Intermittently appears drowsy. Has word finding challenges.     Assessment:  Patient overall appears to be progressing appropriately from a postoperative perspective.  Low suspicion current symptoms are related to a postoperative concern.  Patient's vitals and morning labs are all reasonable and within the expectation of the surgery he had.  On review of the chart, patient has previously been diagnosed with dementia and daughter describes prior episodes of delirium when hospitalized. However, proprioceptive difficulties have persisted and appear new.     Plan:  - Orthostatic vitals obtained: WNL  - NPO with mIVF while having AMS  - HOLD robaxin, oxycodone   - STAT CT  head   - Consult neurology    Plan discussed with attending physician Dr. Espino and chief resident, Dr. Kim.     Isatu Sorensen, PGY2  General Surgery

## 2024-03-25 LAB
ALBUMIN SERPL BCP-MCNC: 3.5 G/DL (ref 3.4–5)
ANION GAP SERPL CALC-SCNC: 12 MMOL/L (ref 10–20)
BUN SERPL-MCNC: 17 MG/DL (ref 6–23)
CALCIUM SERPL-MCNC: 8.5 MG/DL (ref 8.6–10.6)
CHLORIDE SERPL-SCNC: 104 MMOL/L (ref 98–107)
CO2 SERPL-SCNC: 27 MMOL/L (ref 21–32)
CREAT SERPL-MCNC: 0.9 MG/DL (ref 0.5–1.3)
EGFRCR SERPLBLD CKD-EPI 2021: 90 ML/MIN/1.73M*2
ERYTHROCYTE [DISTWIDTH] IN BLOOD BY AUTOMATED COUNT: 13.3 % (ref 11.5–14.5)
GLUCOSE SERPL-MCNC: 117 MG/DL (ref 74–99)
HCT VFR BLD AUTO: 40.8 % (ref 41–52)
HGB BLD-MCNC: 13.6 G/DL (ref 13.5–17.5)
MCH RBC QN AUTO: 32.5 PG (ref 26–34)
MCHC RBC AUTO-ENTMCNC: 33.3 G/DL (ref 32–36)
MCV RBC AUTO: 97 FL (ref 80–100)
NRBC BLD-RTO: 0 /100 WBCS (ref 0–0)
PHOSPHATE SERPL-MCNC: 1.5 MG/DL (ref 2.5–4.9)
PLATELET # BLD AUTO: 147 X10*3/UL (ref 150–450)
POTASSIUM SERPL-SCNC: 4.1 MMOL/L (ref 3.5–5.3)
RBC # BLD AUTO: 4.19 X10*6/UL (ref 4.5–5.9)
SODIUM SERPL-SCNC: 139 MMOL/L (ref 136–145)
WBC # BLD AUTO: 8.2 X10*3/UL (ref 4.4–11.3)

## 2024-03-25 PROCEDURE — 99223 1ST HOSP IP/OBS HIGH 75: CPT

## 2024-03-25 PROCEDURE — 2500000004 HC RX 250 GENERAL PHARMACY W/ HCPCS (ALT 636 FOR OP/ED): Performed by: STUDENT IN AN ORGANIZED HEALTH CARE EDUCATION/TRAINING PROGRAM

## 2024-03-25 PROCEDURE — 97165 OT EVAL LOW COMPLEX 30 MIN: CPT | Mod: GO

## 2024-03-25 PROCEDURE — 85027 COMPLETE CBC AUTOMATED: CPT

## 2024-03-25 PROCEDURE — 2500000001 HC RX 250 WO HCPCS SELF ADMINISTERED DRUGS (ALT 637 FOR MEDICARE OP)

## 2024-03-25 PROCEDURE — 97535 SELF CARE MNGMENT TRAINING: CPT | Mod: GO

## 2024-03-25 PROCEDURE — 2500000004 HC RX 250 GENERAL PHARMACY W/ HCPCS (ALT 636 FOR OP/ED): Performed by: NURSE PRACTITIONER

## 2024-03-25 PROCEDURE — 97116 GAIT TRAINING THERAPY: CPT | Mod: GP

## 2024-03-25 PROCEDURE — 1100000001 HC PRIVATE ROOM DAILY

## 2024-03-25 PROCEDURE — 2500000001 HC RX 250 WO HCPCS SELF ADMINISTERED DRUGS (ALT 637 FOR MEDICARE OP): Performed by: NURSE PRACTITIONER

## 2024-03-25 PROCEDURE — 2500000005 HC RX 250 GENERAL PHARMACY W/O HCPCS: Performed by: NURSE PRACTITIONER

## 2024-03-25 PROCEDURE — 2500000002 HC RX 250 W HCPCS SELF ADMINISTERED DRUGS (ALT 637 FOR MEDICARE OP, ALT 636 FOR OP/ED)

## 2024-03-25 PROCEDURE — 36415 COLL VENOUS BLD VENIPUNCTURE: CPT

## 2024-03-25 PROCEDURE — 99232 SBSQ HOSP IP/OBS MODERATE 35: CPT | Performed by: NURSE PRACTITIONER

## 2024-03-25 PROCEDURE — 80069 RENAL FUNCTION PANEL: CPT

## 2024-03-25 RX ORDER — LANOLIN ALCOHOL/MO/W.PET/CERES
1000 CREAM (GRAM) TOPICAL NIGHTLY
Status: DISCONTINUED | OUTPATIENT
Start: 2024-03-25 | End: 2024-03-29

## 2024-03-25 RX ORDER — IBUPROFEN 400 MG/1
200 TABLET ORAL EVERY 6 HOURS PRN
Status: DISCONTINUED | OUTPATIENT
Start: 2024-03-25 | End: 2024-03-30 | Stop reason: HOSPADM

## 2024-03-25 RX ORDER — PANTOPRAZOLE SODIUM 40 MG/1
40 TABLET, DELAYED RELEASE ORAL
Status: DISCONTINUED | OUTPATIENT
Start: 2024-03-25 | End: 2024-03-30 | Stop reason: HOSPADM

## 2024-03-25 RX ADMIN — TAMSULOSIN HYDROCHLORIDE 0.4 MG: 0.4 CAPSULE ORAL at 08:03

## 2024-03-25 RX ADMIN — ASPIRIN 81 MG: 81 TABLET, COATED ORAL at 08:03

## 2024-03-25 RX ADMIN — SIMVASTATIN 20 MG: 20 TABLET, FILM COATED ORAL at 20:54

## 2024-03-25 RX ADMIN — ACETAMINOPHEN 650 MG: 325 TABLET ORAL at 08:03

## 2024-03-25 RX ADMIN — ACETAMINOPHEN 650 MG: 325 TABLET ORAL at 20:54

## 2024-03-25 RX ADMIN — ESCITALOPRAM 5 MG: 5 TABLET, FILM COATED ORAL at 08:03

## 2024-03-25 RX ADMIN — ENOXAPARIN SODIUM 40 MG: 100 INJECTION SUBCUTANEOUS at 13:31

## 2024-03-25 RX ADMIN — ACETAMINOPHEN 650 MG: 325 TABLET ORAL at 13:31

## 2024-03-25 RX ADMIN — CYANOCOBALAMIN TAB 1000 MCG 1000 MCG: 1000 TAB at 20:54

## 2024-03-25 RX ADMIN — PANTOPRAZOLE SODIUM 40 MG: 40 TABLET, DELAYED RELEASE ORAL at 08:03

## 2024-03-25 RX ADMIN — SODIUM PHOSPHATE, MONOBASIC, MONOHYDRATE AND SODIUM PHOSPHATE, DIBASIC, ANHYDROUS 21 MMOL: 142; 276 INJECTION, SOLUTION INTRAVENOUS at 13:32

## 2024-03-25 ASSESSMENT — PAIN - FUNCTIONAL ASSESSMENT
PAIN_FUNCTIONAL_ASSESSMENT: 0-10

## 2024-03-25 ASSESSMENT — COGNITIVE AND FUNCTIONAL STATUS - GENERAL
CLIMB 3 TO 5 STEPS WITH RAILING: TOTAL
WALKING IN HOSPITAL ROOM: A LITTLE
MOBILITY SCORE: 16
TURNING FROM BACK TO SIDE WHILE IN FLAT BAD: A LITTLE
DRESSING REGULAR LOWER BODY CLOTHING: A LITTLE
TOILETING: A LITTLE
CLIMB 3 TO 5 STEPS WITH RAILING: TOTAL
STANDING UP FROM CHAIR USING ARMS: A LITTLE
TOILETING: A LITTLE
PERSONAL GROOMING: A LITTLE
MOVING FROM LYING ON BACK TO SITTING ON SIDE OF FLAT BED WITH BEDRAILS: A LITTLE
TURNING FROM BACK TO SIDE WHILE IN FLAT BAD: A LITTLE
MOBILITY SCORE: 17
STANDING UP FROM CHAIR USING ARMS: A LITTLE
PERSONAL GROOMING: A LITTLE
STANDING UP FROM CHAIR USING ARMS: A LITTLE
DRESSING REGULAR LOWER BODY CLOTHING: A LITTLE
TURNING FROM BACK TO SIDE WHILE IN FLAT BAD: A LITTLE
MOVING TO AND FROM BED TO CHAIR: A LITTLE
EATING MEALS: A LITTLE
DRESSING REGULAR LOWER BODY CLOTHING: A LITTLE
TOILETING: A LITTLE
MOVING TO AND FROM BED TO CHAIR: A LITTLE
DRESSING REGULAR UPPER BODY CLOTHING: A LITTLE
MOVING TO AND FROM BED TO CHAIR: A LITTLE
DAILY ACTIVITIY SCORE: 17
DAILY ACTIVITIY SCORE: 18
DRESSING REGULAR UPPER BODY CLOTHING: A LITTLE
DRESSING REGULAR UPPER BODY CLOTHING: A LITTLE
HELP NEEDED FOR BATHING: A LOT
PERSONAL GROOMING: A LITTLE
EATING MEALS: A LITTLE
MOVING FROM LYING ON BACK TO SITTING ON SIDE OF FLAT BED WITH BEDRAILS: A LITTLE
MOVING FROM LYING ON BACK TO SITTING ON SIDE OF FLAT BED WITH BEDRAILS: A LITTLE
EATING MEALS: A LITTLE
DAILY ACTIVITIY SCORE: 17
WALKING IN HOSPITAL ROOM: A LITTLE
MOBILITY SCORE: 16
WALKING IN HOSPITAL ROOM: A LITTLE
CLIMB 3 TO 5 STEPS WITH RAILING: A LOT
HELP NEEDED FOR BATHING: A LOT
HELP NEEDED FOR BATHING: A LITTLE

## 2024-03-25 ASSESSMENT — ACTIVITIES OF DAILY LIVING (ADL)
ADL_ASSISTANCE: INDEPENDENT
HOME_MANAGEMENT_TIME_ENTRY: 9

## 2024-03-25 ASSESSMENT — PAIN SCALES - GENERAL
PAINLEVEL_OUTOF10: 0 - NO PAIN
PAINLEVEL_OUTOF10: 4
PAINLEVEL_OUTOF10: 6
PAINLEVEL_OUTOF10: 5 - MODERATE PAIN
PAINLEVEL_OUTOF10: 6
PAINLEVEL_OUTOF10: 0 - NO PAIN

## 2024-03-25 ASSESSMENT — PAIN DESCRIPTION - ORIENTATION
ORIENTATION: MID
ORIENTATION: MID

## 2024-03-25 ASSESSMENT — PAIN DESCRIPTION - LOCATION
LOCATION: ABDOMEN
LOCATION: ABDOMEN

## 2024-03-25 ASSESSMENT — PAIN SCALES - PAIN ASSESSMENT IN ADVANCED DEMENTIA (PAINAD)
BREATHING: NORMAL
BREATHING: NORMAL

## 2024-03-25 ASSESSMENT — PAIN DESCRIPTION - DESCRIPTORS
DESCRIPTORS: PATIENT UNABLE TO DESCRIBE
DESCRIPTORS: DISCOMFORT

## 2024-03-25 NOTE — PROGRESS NOTES
Discharge Planning Note:       Jovanny Yap is a 73 y.o. male on day 3 of admission presenting with Incisional hernia.    Met with the patient and wife at bedside confirmed information on the demographics page. Lives with spouse in a single family home. Has a ramp to enter home. Have all DME needed.  Therapy recommended high intensity level of care given a list of AR facilities will have daughter review for choices.         Saritha Nguyen RN T[facility]

## 2024-03-25 NOTE — CARE PLAN
Problem: Pain - Adult  Goal: Verbalizes/displays adequate comfort level or baseline comfort level  Outcome: Progressing     Problem: Safety - Adult  Goal: Free from fall injury  Outcome: Progressing   The patient's goals for the shift include hemodynamic stability    The clinical goals for the shift include HDS    Over the shift, the patient did make progress toward the following goals.

## 2024-03-25 NOTE — PROGRESS NOTES
Overnight Events: No acute events overnight.    Subjective:  In to see patient this morning. Per patient and wife, his mental status and abnormal movements are significantly improved. On review, his gabapentin and robaxin have been discontinued. All opioids are being held. No new concerns. Patient feels overall well and back to baseline this morning.    Active Medications:  @Scheduled medications  acetaminophen, 650 mg, oral, q6h  aspirin, 81 mg, oral, Daily  cyanocobalamin, 1,000 mcg, oral, Nightly  enoxaparin, 40 mg, subcutaneous, q24h  escitalopram, 5 mg, oral, Daily  [Held by provider] methocarbamol, 500 mg, oral, q8h AMADOU  pantoprazole, 40 mg, oral, Daily before breakfast  simvastatin, 20 mg, oral, Nightly  sodium phosphate, 21 mmol, intravenous, Once  tamsulosin, 0.4 mg, oral, Daily      Continuous medications     PRN medications  PRN medications: ibuprofen, naloxone, [DISCONTINUED] ondansetron ODT **OR** ondansetron, [Held by provider] oxyCODONE, [Held by provider] oxyCODONE     Last Recorded Vitals  Blood pressure 137/87, pulse 74, temperature 37.2 °C (99 °F), resp. rate 18, height 1.829 m (6'), weight 123 kg (271 lb 13.2 oz), SpO2 92 %.    NEUROLOGIC EXAM:    MENTAL STATUS:  - Alert and oriented to person, place, and time  - Recall of recent events intact. Fund of knowledge intact.  - Speech is fluent. Follows complex commands.    CRANIAL NERVES: II-XII intact    MOTOR:  - Subtle bilateral upper extremity myoclonus with sustained posture antigravity  - Normal bulk and tone throughout  - Strength: 5/5 strength throughout    REFLEXES:   L                R  Biceps            +2               +2  Brachioradialis+2               +2  Patellar            +2               +2    COORDINATION: Not assessed  SENSATION: Intact and symmetric to light touch in all extremities  GAIT: Not assessed    Relevant Results  Results for orders placed or performed during the hospital encounter of 03/22/24 (from the past 24 hour(s))    CBC   Result Value Ref Range    WBC 8.2 4.4 - 11.3 x10*3/uL    nRBC 0.0 0.0 - 0.0 /100 WBCs    RBC 4.19 (L) 4.50 - 5.90 x10*6/uL    Hemoglobin 13.6 13.5 - 17.5 g/dL    Hematocrit 40.8 (L) 41.0 - 52.0 %    MCV 97 80 - 100 fL    MCH 32.5 26.0 - 34.0 pg    MCHC 33.3 32.0 - 36.0 g/dL    RDW 13.3 11.5 - 14.5 %    Platelets 147 (L) 150 - 450 x10*3/uL   Renal function panel   Result Value Ref Range    Glucose 117 (H) 74 - 99 mg/dL    Sodium 139 136 - 145 mmol/L    Potassium 4.1 3.5 - 5.3 mmol/L    Chloride 104 98 - 107 mmol/L    Bicarbonate 27 21 - 32 mmol/L    Anion Gap 12 10 - 20 mmol/L    Urea Nitrogen 17 6 - 23 mg/dL    Creatinine 0.90 0.50 - 1.30 mg/dL    eGFR 90 >60 mL/min/1.73m*2    Calcium 8.5 (L) 8.6 - 10.6 mg/dL    Phosphorus 1.5 (L) 2.5 - 4.9 mg/dL    Albumin 3.5 3.4 - 5.0 g/dL         I have personally reviewed the following imaging results: No new pertinent neuroimaging     Assessment  A 73 year old, right-handed, man who was admitted for elective hernia repair. He was noted to have negative myoclonus post-op while on opioids and initiation of gabapentin. Exam otherwise is negative for any focal neurological deficits, apart from chronic left eye esotropia in the setting of sever vision loss in that eye as per family. CTH was obtained by primary team and personally reviewed, which was negative for any acute changes. Clinically, he is significantly improved this morning with minimal negative myoclonus, which further supports a medication-induced etiology.     Impression: Negative myoclonus, like 2ry to opioids and gabapentin.     Plan  - Agree with continuing to hold gabapentin and opioids, patient appears comfortable; would consider alternative pain regimen if needed  - No additional neurologic work-up necessary at this time  - General neurology will sign off at this time, please page [phone] if any further questions or concerns    General Neurology: [id number]  Everette Rosario MD, Neurology Resident

## 2024-03-25 NOTE — PROGRESS NOTES
Occupational Therapy    Evaluation and Treatment    Patient Name: Jovanny aYp  MRN: [id number]  Today's Date: 3/25/2024  Time Calculation  Start Time: 0955  Stop Time: 1019  Time Calculation (min): 24 min    Assessment  IP OT Assessment  OT Assessment: Pt presents with decreased balance and endurance impairing occupational performance of ADLs and functional mobility  End of Session Communication: Bedside nurse  End of Session Patient Position: Up in chair, Alarm off, not on at start of session  Plan:  Treatment Interventions: ADL retraining, Functional transfer training, Compensatory technique education  OT Frequency: 3 times per week  OT Discharge Recommendations: High intensity level of continued care  OT Recommended Transfer Status: Assistive equipment (Comment), Assist of 1  OT - OK to Discharge: Yes (indicates completed eval and discharge recommendation)    Subjective   Current Problem:  1. Incisional hernia of anterior abdominal wall without obstruction or gangrene        2. Incisional hernia, without obstruction or gangrene  Surgical Pathology Exam    Surgical Pathology Exam        General:  Reason for Referral: s/p ex-lap, DOMONIQUE with open repair of incisional hernia with mesh, myofacial release x 2, and excision of abdominal scar 3/22  Past Medical History Relevant to Rehab: HTN, HLD, BPH with prior history of emergent R. Hemicolectomy for colonic perforation after colonoscopy, with multiple subsequent hospital admission for SBO  Prior to Session Communication: Bedside nurse  Patient Position Received: Bed, 3 rail up, Alarm off, not on at start of session  Family/Caregiver Present: Yes  Caregiver Feedback: wife present and supportive  General Comment: pleasant and cooperative, agreeable to OT   Precautions:  Medical Precautions: Abdominal precautions, Fall precautions    Pain:  Pain Assessment  Pain Assessment: 0-10  Pain Score: 0 - No pain  Lines/Tubes/Drains:  Closed/Suction Drain Lateral LLQ Bulb 19 Fr.  (Active)   Number of days: 3       Closed/Suction Drain Lateral RLQ Bulb 19 Fr. (Active)   Number of days: 3         Objective   Cognition:  Arousal/Alertness: Appropriate responses to stimuli  Orientation Level: Disoriented to situation  Following Commands: Follows one step commands with repetition  Insight: Mild  Impulsive: Mildly           Home Living:  Type of Home: House  Lives With: Spouse  Home Adaptive Equipment: Walker rolling or standard, Wheelchair-manual  Home Layout: One level, Full bath main level  Home Access:  (ramp from garage, 1STE front)  Bathroom Shower/Tub: Walk-in shower  Bathroom Toilet: Standard  Bathroom Equipment: Grab bars in shower, Shower chair with back   Prior Function:  Level of [place]: Independent with ADLs and functional transfers, Independent with homemaking with ambulation  Receives Help From: Family  ADL Assistance: Independent  Homemaking Assistance: Independent  Ambulatory Assistance: Independent  Hand Dominance: Right  IADL History:  Current License: Yes  Mode of Transportation: Car  Occupation: Retired  ADL:  Eating Deficit:  (setup only)  Grooming Deficit:  (CGA standing at least 2/2 balance)  Bathing Deficit:  (min A at least 2/2 clothing managtement and functional mobility)  UE Dressing Deficit:  (Pt performed UBD seated min A with setup)  LE Dressing Deficit:  (Pt performed LBD seated min A with setup)  Toileting Deficit:  (Pt performed toileting in bathroom standing CGA)  Activity Tolerance:  Endurance: Decreased tolerance for upright activites    Bed Mobility/Transfers: Bed Mobility  Bed Mobility: Yes  Bed Mobility 1  Bed Mobility 1: Supine to sitting, Log roll  Level of Assistance 1: Minimum assistance, Minimal verbal cues  Bed Mobility Comments 1: VC for log roll technique  Functional Mobility  Functional Mobility Performed: Yes  Functional Mobility 1  Comments 1: Pt performed functional mobility min household distance from bed to bathroom to chair in room with  FWW CGA   and Transfers  Transfer: Yes  Transfer 1  Transfer From 1: Bed to  Transfer to 1: Stand  Technique 1: Sit to stand  Transfer Device 1: Walker  Transfer Level of Assistance 1: Contact guard, Minimal verbal cues  Trials/Comments 1: 1x; VC for hand placement and safety  Transfers 2  Transfer From 2: Stand to  Transfer to 2: Chair with arms  Technique 2: Stand to sit  Transfer Device 2: Walker  Transfer Level of Assistance 2: Contact guard, Minimal verbal cues  Trials/Comments 2: 3x; VC for safety  Transfers 3  Transfer From 3: Chair with arms to  Transfer to 3: Stand  Technique 3: Sit to stand  Transfer Device 3: Walker  Transfer Level of Assistance 3: Contact guard  Trials/Comments 3: 2x    Vision: Vision - Basic Assessment  Current Vision: Wears glasses all the time    Sensation:  Light Touch: No apparent deficits    Hand Function:  Hand Function  Gross Grasp: Functional  Coordination: Functional  Extremities: RUE   RUE : Within Functional Limits, LUE   LUE: Within Functional Limits, RLE   RLE : Within Functional Limits, and LLE   LLE : Within Functional Limits    Outcome Measures: [facility] Daily Activity  Putting on and taking off regular lower body clothing: A little  Bathing (including washing, rinsing, drying): A lot  Putting on and taking off regular upper body clothing: A little  Toileting, which includes using toilet, bedpan or urinal: A little  Taking care of personal grooming such as brushing teeth: A little  Eating Meals: A little  Daily Activity - Total Score: 17         ,     OT Adult Other Outcome Measures  4AT: 4AT-    Education Documentation  Precautions, taught by Ange Jones OT at 3/25/2024 11:31 AM.  Learner: Patient  Readiness: Acceptance  Method: Explanation  Response: Verbalizes Understanding    ADL Training, taught by Ange Jones OT at 3/25/2024 11:31 AM.  Learner: Patient  Readiness: Acceptance  Method: Explanation  Response: Verbalizes Understanding    OT Treatment:  OT  educated Pt and spouse on abdominal precautions, no bending, lifting, or twisting; adaptive/compensatory strategies for ADLs and functional mobility; log roll for bed mobility; home setup; car transfers  Pt performed toileting standing in bathroom CGA, VC for safety and AE positioning.  Pt performed dressing seated in chair. Pt doffed/donned gown min A with setup. Pt doffed B socks SBA with moderate VC and TC for 4 figure position and attention to task; min A with setup victorino socks in 4 figure position. Pt donned underpants seated in 4 figure position min A and finished donning over hips standing CGA for standing balance.      Goals:     Encounter Problems       Encounter Problems (Active)       ADLs       Patient with complete lower body dressing with supervision level of assistance donning and doffing all LE clothes  (Progressing)       Start:  03/25/24    Expected End:  04/08/24            Patient will complete toileting including hygiene clothing management/hygiene with supervision level of assistance and DME prn. (Progressing)       Start:  03/25/24    Expected End:  04/08/24               BALANCE       Pt will maintain dynamic standing balance >8minutes during ADL task with supervision level of assistance in order to demonstrate decreased risk of falling and improved postural control. (Progressing)       Start:  03/25/24    Expected End:  04/08/24               COGNITION/SAFETY       Patient will recall and adhere to abdominal precautions during all functional mobility/ADL tasks in order to demonstrate improved understanding and promote healing post op (Progressing)       Start:  03/25/24    Expected End:  04/08/24               MOBILITY       Patient will perform Functional mobility mod  Household distances/Community Distances with stand by assist level of assistance and least restrictive device in order to improve safety and functional mobility. (Progressing)       Start:  03/25/24    Expected End:  04/08/24                TRANSFERS       Patient will perform bed mobility stand by assist level of assistance  in order to improve safety and independence with mobility (Progressing)       Start:  03/25/24    Expected End:  04/08/24            Patient will complete functional transfers with least restrictive device with supervision level of assistance. (Progressing)       Start:  03/25/24    Expected End:  04/08/24 03/25/24 at 11:31 AM   Ange Jones OT   Rehab Office: [phone]

## 2024-03-25 NOTE — PROGRESS NOTES
Jovanny Yap is a 73 y.o. male on day 3 of admission presenting with Incisional hernia.    Subjective   Feeling frustrated over hospitalization, but feeling better today. OOB with PT yesterday. Has been passing flatus. No nausea/emesis. Pain controlled well with tylenol.     Objective     Physical Exam  Vitals reviewed.   Constitutional:       General: He is not in acute distress.  HENT:      Head: Normocephalic and atraumatic.   Eyes:      Extraocular Movements: Extraocular movements intact.   Cardiovascular:      Rate and Rhythm: Normal rate and regular rhythm.   Pulmonary:      Effort: Pulmonary effort is normal. No respiratory distress.      Comments: On supplemental O2 NC   Abdominal:      General: There is no distension.      Palpations: Abdomen is soft.      Tenderness: There is no abdominal tenderness.      Comments: Midline with staples D/I, well approximated. Bilateral NATIVIDAD drains with serosanguinous output    Skin:     General: Skin is warm and dry.   Neurological:      Mental Status: He is oriented to person, place, and time.   Psychiatric:         Mood and Affect: Mood normal.         Behavior: Behavior normal.       Last Recorded Vitals  Blood pressure 150/80, pulse 73, temperature 36.7 °C (98.1 °F), temperature source Temporal, resp. rate 19, height 1.829 m (6'), weight 123 kg (271 lb 13.2 oz), SpO2 96 %.  Intake/Output last 3 Shifts:  I/O last 3 completed shifts:  In: 1267.5 (10.3 mL/kg) [P.O.:240; I.V.:1027.5 (8.3 mL/kg)]  Out: 1920 (15.6 mL/kg) [Urine:1585 (0.4 mL/kg/hr); Drains:335]  Weight: 123.3 kg     Relevant Results  Scheduled medications  acetaminophen, 650 mg, oral, q6h  aspirin, 81 mg, oral, Daily  cyanocobalamin, 1,000 mcg, oral, Nightly  enoxaparin, 40 mg, subcutaneous, q24h  escitalopram, 5 mg, oral, Daily  [Held by provider] methocarbamol, 500 mg, oral, q8h AMADOU  pantoprazole, 40 mg, oral, Daily before breakfast  simvastatin, 20 mg, oral, Nightly  tamsulosin, 0.4 mg, oral,  Daily      Continuous medications  lactated Ringer's, 50 mL/hr, Last Rate: 50 mL/hr (03/25/24 0655)      PRN medications  PRN medications: naloxone, [DISCONTINUED] ondansetron ODT **OR** ondansetron, [Held by provider] oxyCODONE, [Held by provider] oxyCODONE           Assessment/Plan   Principal Problem:    Incisional hernia  Active Problems:    Incisional hernia of anterior abdominal wall without obstruction or gangrene    73 year old male with h/o HTN, HLD, BPH, GERD, anxiety, R hemicolectomy, incisional hernia, adhesive SBO s/p elective exploratory laparotomy, lysis of adhesions, open repair of incisional hernia with retrorectus mesh, myofascial release x2, TAP block, excision of abdominal scar on 3/22 with Dr. Espino. Neurology consulted on 3/24 for difficulty with word finding/dizziness with negative neuro work up, likely related to medications and post-op delirium. He is progressing appropriately from a surgical standpoint.     PLAN:   Neurology: post operative pain, well controlled, h/o anxiety   - scheduled tylenol   - ibuprofen PRN   - hold narcotics given recent delirium   - continue home lexapro     Cardiovascular: h/o HTN, HLD   -Vital signs every 4 hours  - continue home aspirin, simvastatin; hold lisinopril immediately post op     Pulmonology:  -IS x10 every hour   -Wean O2 as tolerated; Maintain SpO2 >92 %     GI: h/o GERD, R hemicolectomy, incisional hernia s/p ex lap, lysis of adhesions, open repair of incisional hernia with retrorectus mesh, myofascial release x2, TAP block, excision of abdominal scar  - soft diet as tolerated   - Zofran PRN nausea   - continue home PPI   - maintain abdominal binder and NATIVIDAD drains to bulb sx    :  - Strict I&O   - replace electrolytes as needed to maintain K >4, Mag >2.   -mIVF: LR @ 50, HLIV once taking in adequate PO     DVT Prophylaxis:  -Continue subcutaneous lovenox, SCDs, ambulation/OOB with assistance    Disposition:  - RNF  - Continue PT/OT while inpatient;  PT recommendation for acute rehab at discharge.       Plan of care discussed with attending, Dr. Espino.     Mari Brower, APRN, CNP  [facility]  [phone]

## 2024-03-25 NOTE — PROGRESS NOTES
Physical Therapy    Physical Therapy Treatment    Patient Name: Jovanny Yap  MRN: [id number]  Today's Date: 3/25/2024  Time Calculation  Start Time: 1146  Stop Time: 1200  Time Calculation (min): 14 min       Assessment/Plan   PT Assessment  End of Session Communication: Bedside nurse  End of Session Patient Position: Bed, 3 rail up, Alarm off, not on at start of session     PT Plan  Treatment/Interventions: Bed mobility, Transfer training, Gait training, Balance training, Strengthening, Endurance training, Therapeutic exercise, Therapeutic activity  PT Plan: Skilled PT  PT Frequency: 5 times per week  PT Discharge Recommendations: High intensity level of continued care  Equipment Recommended upon Discharge:  (to be determined)  PT Recommended Transfer Status: Assist x2, Assistive device (WW)  PT - OK to Discharge: Yes (meaning pt seen and DC rec made)      General Visit Information:   PT  Visit  PT Received On: 03/25/24  General  Prior to Session Communication: Bedside nurse  Patient Position Received: Bed, 3 rail up, Alarm off, not on at start of session    Subjective   Precautions:  Precautions  Medical Precautions: Abdominal precautions, Fall precautions       Objective   Pain:  Pain Assessment  Pain Assessment: 0-10  Pain Score: 6  Pain Location: Back     Postural Control:  Static Sitting Balance  Static Sitting-Level of Assistance: Close supervision  Dynamic Sitting Balance  Dynamic Sitting-Comments: CGA  Static Standing Balance  Static Standing-Comment/Number of Minutes: CGA with walker  Dynamic Standing Balance  Dynamic Standing-Comments: min assist with walker       Treatments:  Bed Mobility  Bed Mobility: Yes  Bed Mobility 1  Bed Mobility 1: Supine to sitting, Sitting to supine  Level of Assistance 1: Minimum assistance  Bed Mobility Comments 1: HOB elevated, verbal/tactile cues for logroll    Ambulation/Gait Training  Ambulation/Gait Training Performed: Yes  Ambulation/Gait Training 1  Device 1: Rolling  walker  Assistance 1: Minimum assistance  Quality of Gait 1: Decreased step length (decreased alana, verbal cues for sequencing and safe use of walker)  Comments/Distance (ft) 1: 60 feet  Transfers  Transfer: Yes  Transfer 1  Technique 1: Sit to stand, Stand to sit  Transfer Device 1: Walker  Transfer Level of Assistance 1: Minimum assistance  Trials/Comments 1: verbal cues for hand placement, stood 2x    Outcome Measures:  [facility] Basic Mobility  Turning from your back to your side while in a flat bed without using bedrails: A little  Moving from lying on your back to sitting on the side of a flat bed without using bedrails: A little  Moving to and from bed to chair (including a wheelchair): A little  Standing up from a chair using your arms (e.g. wheelchair or bedside chair): A little  To walk in hospital room: A little  Climbing 3-5 steps with railing: Total  Basic Mobility - Total Score: 16    Education Documentation  Precautions, taught by Jami Kee, PT at 3/25/2024 12:14 PM.  Learner: Patient  Readiness: Acceptance  Method: Explanation  Response: Needs Reinforcement    Body Mechanics, taught by Jami Kee PT at 3/25/2024 12:14 PM.  Learner: Patient  Readiness: Acceptance  Method: Explanation  Response: Needs Reinforcement    Mobility Training, taught by Jami Kee PT at 3/25/2024 12:14 PM.  Learner: Patient  Readiness: Acceptance  Method: Explanation  Response: Needs Reinforcement    Education Comments  No comments found.           Encounter Problems       Encounter Problems (Active)       PT Problem       Patient will complete supine to sit and sit to supine Independent        Start:  03/24/24    Expected End:  04/07/24            Patient will perform sit<>stand transfer with Rolling Walker, and Modified Independent        Start:  03/24/24    Expected End:  04/07/24            Patient will ambulate >150' with Rolling Walker and Modified Independent        Start:  03/24/24    Expected  End:  04/07/24            Patient will verbalize and demonstrate abdominal precautions independently.        Start:  03/24/24    Expected End:  04/07/24            Pt able to demo static standing IND with LRAD x 5 min       Start:  03/24/24    Expected End:  04/07/24               Pain - Adult                03/25/24 at 12:15 PM  Jami Kee, PT

## 2024-03-25 NOTE — CARE PLAN
The patient's goals for the shift include hemodynamic stability    The clinical goals for the shift include patient will remain HDS    Problem: Pain - Adult  Goal: Verbalizes/displays adequate comfort level or baseline comfort level  Outcome: Progressing     Problem: Safety - Adult  Goal: Free from fall injury  Outcome: Progressing     Problem: Discharge Planning  Goal: Discharge to home or other facility with appropriate resources  Outcome: Progressing     Problem: Chronic Conditions and Co-morbidities  Goal: Patient's chronic conditions and co-morbidity symptoms are monitored and maintained or improved  Outcome: Progressing     Problem: Skin  Goal: Decreased wound size/increased tissue granulation at next dressing change  Outcome: Progressing  Flowsheets (Taken 3/25/2024 1010)  Decreased wound size/increased tissue granulation at next dressing change: Promote sleep for wound healing  Goal: Participates in plan/prevention/treatment measures  Outcome: Progressing  Flowsheets (Taken 3/25/2024 1010)  Participates in plan/prevention/treatment measures:   Increase activity/out of bed for meals   Elevate heels  Goal: Prevent/manage excess moisture  Outcome: Progressing  Flowsheets (Taken 3/25/2024 1010)  Prevent/manage excess moisture: Monitor for/manage infection if present

## 2024-03-26 LAB
ALBUMIN SERPL BCP-MCNC: 3.4 G/DL (ref 3.4–5)
ANION GAP SERPL CALC-SCNC: 15 MMOL/L (ref 10–20)
BUN SERPL-MCNC: 18 MG/DL (ref 6–23)
CALCIUM SERPL-MCNC: 8.9 MG/DL (ref 8.6–10.6)
CHLORIDE SERPL-SCNC: 105 MMOL/L (ref 98–107)
CO2 SERPL-SCNC: 26 MMOL/L (ref 21–32)
CREAT SERPL-MCNC: 0.94 MG/DL (ref 0.5–1.3)
EGFRCR SERPLBLD CKD-EPI 2021: 86 ML/MIN/1.73M*2
GLUCOSE SERPL-MCNC: 108 MG/DL (ref 74–99)
PHOSPHATE SERPL-MCNC: 2.1 MG/DL (ref 2.5–4.9)
POTASSIUM SERPL-SCNC: 3.6 MMOL/L (ref 3.5–5.3)
SODIUM SERPL-SCNC: 142 MMOL/L (ref 136–145)

## 2024-03-26 PROCEDURE — 2500000001 HC RX 250 WO HCPCS SELF ADMINISTERED DRUGS (ALT 637 FOR MEDICARE OP): Performed by: NURSE PRACTITIONER

## 2024-03-26 PROCEDURE — 97110 THERAPEUTIC EXERCISES: CPT | Mod: GP

## 2024-03-26 PROCEDURE — 99232 SBSQ HOSP IP/OBS MODERATE 35: CPT | Performed by: NURSE PRACTITIONER

## 2024-03-26 PROCEDURE — 2500000004 HC RX 250 GENERAL PHARMACY W/ HCPCS (ALT 636 FOR OP/ED): Performed by: NURSE PRACTITIONER

## 2024-03-26 PROCEDURE — 36415 COLL VENOUS BLD VENIPUNCTURE: CPT | Performed by: NURSE PRACTITIONER

## 2024-03-26 PROCEDURE — 2500000005 HC RX 250 GENERAL PHARMACY W/O HCPCS: Performed by: NURSE PRACTITIONER

## 2024-03-26 PROCEDURE — 80069 RENAL FUNCTION PANEL: CPT | Performed by: NURSE PRACTITIONER

## 2024-03-26 PROCEDURE — 1100000001 HC PRIVATE ROOM DAILY

## 2024-03-26 PROCEDURE — 2500000001 HC RX 250 WO HCPCS SELF ADMINISTERED DRUGS (ALT 637 FOR MEDICARE OP)

## 2024-03-26 PROCEDURE — 2500000004 HC RX 250 GENERAL PHARMACY W/ HCPCS (ALT 636 FOR OP/ED): Performed by: STUDENT IN AN ORGANIZED HEALTH CARE EDUCATION/TRAINING PROGRAM

## 2024-03-26 PROCEDURE — 2500000002 HC RX 250 W HCPCS SELF ADMINISTERED DRUGS (ALT 637 FOR MEDICARE OP, ALT 636 FOR OP/ED)

## 2024-03-26 RX ORDER — ACETAMINOPHEN 325 MG/1
650 TABLET ORAL EVERY 6 HOURS PRN
Qty: 30 TABLET | Refills: 0
Start: 2024-03-26

## 2024-03-26 RX ORDER — TALC
3 POWDER (GRAM) TOPICAL NIGHTLY
Status: DISCONTINUED | OUTPATIENT
Start: 2024-03-26 | End: 2024-03-30 | Stop reason: HOSPADM

## 2024-03-26 RX ADMIN — POTASSIUM PHOSPHATE, MONOBASIC POTASSIUM PHOSPHATE, DIBASIC 15 MMOL: 224; 236 INJECTION, SOLUTION, CONCENTRATE INTRAVENOUS at 09:24

## 2024-03-26 RX ADMIN — ACETAMINOPHEN 650 MG: 325 TABLET ORAL at 14:40

## 2024-03-26 RX ADMIN — ACETAMINOPHEN 650 MG: 325 TABLET ORAL at 09:24

## 2024-03-26 RX ADMIN — PANTOPRAZOLE SODIUM 40 MG: 40 TABLET, DELAYED RELEASE ORAL at 07:11

## 2024-03-26 RX ADMIN — SIMVASTATIN 20 MG: 20 TABLET, FILM COATED ORAL at 20:01

## 2024-03-26 RX ADMIN — ACETAMINOPHEN 650 MG: 325 TABLET ORAL at 20:01

## 2024-03-26 RX ADMIN — ACETAMINOPHEN 650 MG: 325 TABLET ORAL at 02:50

## 2024-03-26 RX ADMIN — ASPIRIN 81 MG: 81 TABLET, COATED ORAL at 09:24

## 2024-03-26 RX ADMIN — IBUPROFEN 200 MG: 400 TABLET, FILM COATED ORAL at 00:09

## 2024-03-26 RX ADMIN — ENOXAPARIN SODIUM 40 MG: 100 INJECTION SUBCUTANEOUS at 14:40

## 2024-03-26 RX ADMIN — TAMSULOSIN HYDROCHLORIDE 0.4 MG: 0.4 CAPSULE ORAL at 09:24

## 2024-03-26 RX ADMIN — MELATONIN 3 MG: 3 TAB ORAL at 20:01

## 2024-03-26 RX ADMIN — CYANOCOBALAMIN TAB 1000 MCG 1000 MCG: 1000 TAB at 20:01

## 2024-03-26 RX ADMIN — ESCITALOPRAM 5 MG: 5 TABLET, FILM COATED ORAL at 09:24

## 2024-03-26 ASSESSMENT — COGNITIVE AND FUNCTIONAL STATUS - GENERAL
STANDING UP FROM CHAIR USING ARMS: A LITTLE
TURNING FROM BACK TO SIDE WHILE IN FLAT BAD: A LITTLE
MOBILITY SCORE: 16
MOVING FROM LYING ON BACK TO SITTING ON SIDE OF FLAT BED WITH BEDRAILS: A LITTLE
CLIMB 3 TO 5 STEPS WITH RAILING: TOTAL
MOVING TO AND FROM BED TO CHAIR: A LITTLE
WALKING IN HOSPITAL ROOM: A LITTLE

## 2024-03-26 ASSESSMENT — PAIN SCALES - WONG BAKER: WONGBAKER_NUMERICALRESPONSE: HURTS LITTLE BIT

## 2024-03-26 ASSESSMENT — PAIN SCALES - GENERAL
PAINLEVEL_OUTOF10: 0 - NO PAIN
PAINLEVEL_OUTOF10: 5 - MODERATE PAIN
PAINLEVEL_OUTOF10: 0 - NO PAIN

## 2024-03-26 ASSESSMENT — PAIN SCALES - PAIN ASSESSMENT IN ADVANCED DEMENTIA (PAINAD)
TOTALSCORE: 0
BODYLANGUAGE: RELAXED
CONSOLABILITY: NO NEED TO CONSOLE
BREATHING: NORMAL
FACIALEXPRESSION: SMILING OR INEXPRESSIVE

## 2024-03-26 ASSESSMENT — PAIN - FUNCTIONAL ASSESSMENT
PAIN_FUNCTIONAL_ASSESSMENT: 0-10

## 2024-03-26 ASSESSMENT — PAIN DESCRIPTION - ORIENTATION: ORIENTATION: MID;LOWER

## 2024-03-26 ASSESSMENT — PAIN DESCRIPTION - LOCATION: LOCATION: ABDOMEN

## 2024-03-26 NOTE — PROGRESS NOTES
Jovanny Yap is a 73 y.o. male on day 4 of admission presenting with Incisional hernia.    Subjective   Did not sleep well overnight. Pain well controlled. Tolerating soft diet. OOB with PT/OT yesterday, pending confirmation of acute rehab choice.     Objective     Physical Exam  Vitals reviewed.   Constitutional:       General: He is not in acute distress.  HENT:      Head: Normocephalic and atraumatic.   Eyes:      Extraocular Movements: Extraocular movements intact.   Cardiovascular:      Rate and Rhythm: Normal rate and regular rhythm.   Pulmonary:      Effort: Pulmonary effort is normal. No respiratory distress.      Comments: On room air   Abdominal:      General: There is no distension.      Palpations: Abdomen is soft.      Tenderness: There is no abdominal tenderness.      Comments: Midline with staples D/I, well approximated. Bilateral NATIVIDAD drains with serosanguinous output, abdominal binder in place    Skin:     General: Skin is warm and dry.   Neurological:      Mental Status: He is oriented to person, place, and time.   Psychiatric:         Mood and Affect: Mood normal.         Behavior: Behavior normal.       Last Recorded Vitals  Blood pressure 153/88, pulse 71, temperature 36.8 °C (98.2 °F), temperature source Temporal, resp. rate 17, height 1.829 m (6'), weight 122 kg (268 lb 15.4 oz), SpO2 93 %.  Intake/Output last 3 Shifts:  I/O last 3 completed shifts:  In: 2582.5 (21.2 mL/kg) [P.O.:360; I.V.:1972.5 (16.2 mL/kg); IV Piggyback:250]  Out: 1415 (11.6 mL/kg) [Urine:1100 (0.3 mL/kg/hr); Drains:315]  Weight: 122 kg     Relevant Results  Scheduled medications  acetaminophen, 650 mg, oral, q6h  aspirin, 81 mg, oral, Daily  cyanocobalamin, 1,000 mcg, oral, Nightly  enoxaparin, 40 mg, subcutaneous, q24h  escitalopram, 5 mg, oral, Daily  melatonin, 3 mg, oral, Nightly  [Held by provider] methocarbamol, 500 mg, oral, q8h AMADOU  pantoprazole, 40 mg, oral, Daily before breakfast  potassium phosphate, 15 mmol,  intravenous, Once  simvastatin, 20 mg, oral, Nightly  tamsulosin, 0.4 mg, oral, Daily      Continuous medications       PRN medications  PRN medications: ibuprofen, naloxone, [DISCONTINUED] ondansetron ODT **OR** ondansetron, [Held by provider] oxyCODONE, [Held by provider] oxyCODONE           Assessment/Plan   Principal Problem:    Incisional hernia  Active Problems:    Incisional hernia of anterior abdominal wall without obstruction or gangrene    73 year old male with h/o HTN, HLD, BPH, GERD, anxiety, R hemicolectomy, incisional hernia, adhesive SBO s/p elective exploratory laparotomy, lysis of adhesions, open repair of incisional hernia with retrorectus mesh, myofascial release x2, TAP block, excision of abdominal scar on 3/22 with Dr. Espino. Neurology consulted on 3/24 for difficulty with word finding/dizziness with negative neuro work up, likely related to medications and post-op delirium. He is progressing appropriately from a surgical standpoint. Pending discharge to acute rehab.     PLAN:   Neurology: post operative pain, well controlled, h/o anxiety, post-op delirium/insomnia   - scheduled tylenol   - ibuprofen PRN   - hold narcotics given recent delirium   - continue home lexapro   - add melatonin at bedtime to assist with sleep     Cardiovascular: h/o HTN, HLD   -Vital signs every 4 hours  - continue home aspirin, simvastatin; hold lisinopril immediately post op     Pulmonology:  -IS x10 every hour   -Maintain SpO2 >92 % RA    GI: h/o GERD, R hemicolectomy, incisional hernia s/p ex lap, lysis of adhesions, open repair of incisional hernia with retrorectus mesh, myofascial release x2, TAP block, excision of abdominal scar  - soft diet as tolerated   - Zofran PRN nausea   - continue home PPI   - maintain abdominal binder and NATIVIDAD drains to bulb sx    :  - Strict I&O   - replace electrolytes as needed to maintain K >4, Mag >2, Phos >3. K phos given today for electrolyte depletion   -HLIV     DVT  Prophylaxis:  -Continue subcutaneous lovenox, SCDs, ambulation/OOB with assistance    Disposition:  - RNF  - Continue PT/OT while inpatient; recommendation for acute rehab at discharge. Pending facility selection.       Plan of care discussed with attending, Dr. Espino.     Mari Brower APRN, CNP  [facility]  [id number]

## 2024-03-26 NOTE — PROGRESS NOTES
Physical Therapy    Physical Therapy Treatment    Patient Name: Jovanny Yap  MRN: [id number]  Today's Date: 3/26/2024  Time Calculation  Start Time: 1443  Stop Time: 1452  Time Calculation (min): 9 min       Assessment/Plan   PT Assessment  End of Session Communication: Bedside nurse  End of Session Patient Position: Bed, 3 rail up, Alarm off, not on at start of session     PT Plan  Treatment/Interventions: Bed mobility, Transfer training, Gait training, Balance training, Strengthening, Endurance training, Therapeutic exercise, Therapeutic activity  PT Plan: Skilled PT  PT Frequency: 5 times per week  PT Discharge Recommendations: High intensity level of continued care  Equipment Recommended upon Discharge:  (to be determined)  PT Recommended Transfer Status: Assist x2, Assistive device (WW)  PT - OK to Discharge: Yes (meaning pt seen and DC rec made)      General Visit Information:   PT  Visit  PT Received On: 03/26/24  General  Prior to Session Communication: Bedside nurse  Patient Position Received: Up in bathroom (RN present)  General Comment: pt declined ambulation in the hallway due to c/o feeling tired; stated he ambulated in the hallway earlier; pt agreed to perform BLE ther-ex    Subjective   Precautions:  Precautions  Medical Precautions: Abdominal precautions, Fall precautions    Objective   Pain:  Pain Assessment  Pain Assessment: 0-10  Pain Score: 0 - No pain     Postural Control:  Static Sitting Balance  Static Sitting-Level of Assistance: Close supervision  Dynamic Sitting Balance  Dynamic Sitting-Comments: SBA  Static Standing Balance  Static Standing-Comment/Number of Minutes: CGA  Dynamic Standing Balance  Dynamic Standing-Comments: CGA/min assist     Treatments:  Therapeutic Exercise  Therapeutic Exercise Performed: Yes  Therapeutic Exercise Activity 1: seated BLE x15: AP, LAQ, hip flex, hip abd/add    Bed Mobility  Bed Mobility: Yes  Bed Mobility 1  Bed Mobility 1: Sitting to supine  Level of  Assistance 1: Contact guard  Bed Mobility Comments 1: verbal cues    Ambulation/Gait Training  Ambulation/Gait Training Performed: Yes  Ambulation/Gait Training 1  Device 1: No device  Assistance 1: Contact guard  Quality of Gait 1: Decreased step length (decreased alana)  Comments/Distance (ft) 1: 10 feet  Transfers  Transfer: Yes  Transfer 1  Technique 1: Sit to stand, Stand to sit  Transfer Level of Assistance 1: Contact guard  Trials/Comments 1: stood 2x, verbal cues for safety  Transfers 2  Transfer From 2: Bed to  Transfer to 2: Chair with arms  Technique 2: Sit to stand  Transfer Level of Assistance 2: Contact guard  Trials/Comments 2: verbal cues    Outcome Measures:  [facility] Basic Mobility  Turning from your back to your side while in a flat bed without using bedrails: A little  Moving from lying on your back to sitting on the side of a flat bed without using bedrails: A little  Moving to and from bed to chair (including a wheelchair): A little  Standing up from a chair using your arms (e.g. wheelchair or bedside chair): A little  To walk in hospital room: A little  Climbing 3-5 steps with railing: Total  Basic Mobility - Total Score: 16    Education Documentation  Precautions, taught by Jami Kee PT at 3/26/2024  3:10 PM.  Learner: Patient  Readiness: Acceptance  Method: Explanation  Response: Needs Reinforcement    Mobility Training, taught by Jami Kee PT at 3/26/2024  3:10 PM.  Learner: Patient  Readiness: Acceptance  Method: Explanation  Response: Needs Reinforcement    Education Comments  No comments found.           Encounter Problems       Encounter Problems (Active)       PT Problem       Patient will complete supine to sit and sit to supine Independent  (Progressing)       Start:  03/24/24    Expected End:  04/07/24            Patient will perform sit<>stand transfer with Rolling Walker, and Modified Independent  (Progressing)       Start:  03/24/24    Expected End:  04/07/24             Patient will ambulate >150' with Rolling Walker and Modified Independent  (Progressing)       Start:  03/24/24    Expected End:  04/07/24            Patient will verbalize and demonstrate abdominal precautions independently.  (Progressing)       Start:  03/24/24    Expected End:  04/07/24            Pt able to demo static standing IND with LRAD x 5 min (Progressing)       Start:  03/24/24    Expected End:  04/07/24               Pain - Adult                03/26/24 at 3:11 PM  Jami Kee, PT

## 2024-03-26 NOTE — PROGRESS NOTES
Discharge planning note:       Jovanny Yap is a 73 y.o. male on day 4 of admission presenting with Incisional hernia.    Spoke with the patients wife Odalis [phone] informed of [facility]. Referral sent for review.           Saritha Nguyen RN TCC

## 2024-03-26 NOTE — CARE PLAN
Problem: Safety - Adult  Goal: Free from fall injury  Outcome: Progressing     The patient's goals for the shift include safety.     The clinical goals for the shift include Safety.    Over the shift, the patient did make progress toward the following goals.

## 2024-03-26 NOTE — PROGRESS NOTES
Physical Therapy    Therapy Communication Note    Patient Name: Jovanny Yap  MRN: [id number]  Today's Date: 3/26/2024     Discipline: Physical Therapy      Missed Visit: Yes  Missed Visit Reason:  (pt eating lunch)      03/26/24 at 2:18 PM   Jami Kee, PT

## 2024-03-27 LAB
ALBUMIN SERPL BCP-MCNC: 3.1 G/DL (ref 3.4–5)
ANION GAP SERPL CALC-SCNC: 14 MMOL/L (ref 10–20)
BUN SERPL-MCNC: 17 MG/DL (ref 6–23)
CALCIUM SERPL-MCNC: 8.6 MG/DL (ref 8.6–10.6)
CHLORIDE SERPL-SCNC: 106 MMOL/L (ref 98–107)
CO2 SERPL-SCNC: 27 MMOL/L (ref 21–32)
CREAT SERPL-MCNC: 0.9 MG/DL (ref 0.5–1.3)
EGFRCR SERPLBLD CKD-EPI 2021: 90 ML/MIN/1.73M*2
GLUCOSE SERPL-MCNC: 111 MG/DL (ref 74–99)
PHOSPHATE SERPL-MCNC: 2.7 MG/DL (ref 2.5–4.9)
POTASSIUM SERPL-SCNC: 3.5 MMOL/L (ref 3.5–5.3)
SODIUM SERPL-SCNC: 143 MMOL/L (ref 136–145)

## 2024-03-27 PROCEDURE — 80069 RENAL FUNCTION PANEL: CPT | Performed by: NURSE PRACTITIONER

## 2024-03-27 PROCEDURE — 2500000004 HC RX 250 GENERAL PHARMACY W/ HCPCS (ALT 636 FOR OP/ED): Performed by: STUDENT IN AN ORGANIZED HEALTH CARE EDUCATION/TRAINING PROGRAM

## 2024-03-27 PROCEDURE — 2500000002 HC RX 250 W HCPCS SELF ADMINISTERED DRUGS (ALT 637 FOR MEDICARE OP, ALT 636 FOR OP/ED): Performed by: NURSE PRACTITIONER

## 2024-03-27 PROCEDURE — 99232 SBSQ HOSP IP/OBS MODERATE 35: CPT | Performed by: NURSE PRACTITIONER

## 2024-03-27 PROCEDURE — 36415 COLL VENOUS BLD VENIPUNCTURE: CPT | Performed by: NURSE PRACTITIONER

## 2024-03-27 PROCEDURE — 1100000001 HC PRIVATE ROOM DAILY

## 2024-03-27 PROCEDURE — 2500000001 HC RX 250 WO HCPCS SELF ADMINISTERED DRUGS (ALT 637 FOR MEDICARE OP): Performed by: NURSE PRACTITIONER

## 2024-03-27 PROCEDURE — 2500000002 HC RX 250 W HCPCS SELF ADMINISTERED DRUGS (ALT 637 FOR MEDICARE OP, ALT 636 FOR OP/ED)

## 2024-03-27 PROCEDURE — 2500000001 HC RX 250 WO HCPCS SELF ADMINISTERED DRUGS (ALT 637 FOR MEDICARE OP)

## 2024-03-27 RX ORDER — LISINOPRIL 10 MG/1
10 TABLET ORAL DAILY
Status: DISCONTINUED | OUTPATIENT
Start: 2024-03-27 | End: 2024-03-30 | Stop reason: HOSPADM

## 2024-03-27 RX ORDER — POTASSIUM CHLORIDE 20 MEQ/1
20 TABLET, EXTENDED RELEASE ORAL ONCE
Status: COMPLETED | OUTPATIENT
Start: 2024-03-27 | End: 2024-03-27

## 2024-03-27 RX ADMIN — MELATONIN 3 MG: 3 TAB ORAL at 20:58

## 2024-03-27 RX ADMIN — TAMSULOSIN HYDROCHLORIDE 0.4 MG: 0.4 CAPSULE ORAL at 08:12

## 2024-03-27 RX ADMIN — ACETAMINOPHEN 650 MG: 325 TABLET ORAL at 14:49

## 2024-03-27 RX ADMIN — SIMVASTATIN 20 MG: 20 TABLET, FILM COATED ORAL at 23:05

## 2024-03-27 RX ADMIN — PANTOPRAZOLE SODIUM 40 MG: 40 TABLET, DELAYED RELEASE ORAL at 08:12

## 2024-03-27 RX ADMIN — ENOXAPARIN SODIUM 40 MG: 100 INJECTION SUBCUTANEOUS at 14:49

## 2024-03-27 RX ADMIN — ACETAMINOPHEN 650 MG: 325 TABLET ORAL at 20:58

## 2024-03-27 RX ADMIN — POTASSIUM CHLORIDE 20 MEQ: 1500 TABLET, EXTENDED RELEASE ORAL at 09:33

## 2024-03-27 RX ADMIN — ESCITALOPRAM 5 MG: 5 TABLET, FILM COATED ORAL at 08:12

## 2024-03-27 RX ADMIN — LISINOPRIL 10 MG: 10 TABLET ORAL at 09:33

## 2024-03-27 RX ADMIN — CYANOCOBALAMIN TAB 1000 MCG 1000 MCG: 1000 TAB at 20:58

## 2024-03-27 RX ADMIN — ASPIRIN 81 MG: 81 TABLET, COATED ORAL at 08:12

## 2024-03-27 RX ADMIN — ACETAMINOPHEN 650 MG: 325 TABLET ORAL at 08:12

## 2024-03-27 RX ADMIN — ACETAMINOPHEN 650 MG: 325 TABLET ORAL at 01:47

## 2024-03-27 ASSESSMENT — COGNITIVE AND FUNCTIONAL STATUS - GENERAL
HELP NEEDED FOR BATHING: A LITTLE
STANDING UP FROM CHAIR USING ARMS: A LITTLE
TURNING FROM BACK TO SIDE WHILE IN FLAT BAD: A LITTLE
PERSONAL GROOMING: A LITTLE
TOILETING: A LITTLE
TURNING FROM BACK TO SIDE WHILE IN FLAT BAD: A LITTLE
TOILETING: A LITTLE
MOBILITY SCORE: 17
DAILY ACTIVITIY SCORE: 19
WALKING IN HOSPITAL ROOM: A LITTLE
CLIMB 3 TO 5 STEPS WITH RAILING: A LITTLE
MOVING FROM LYING ON BACK TO SITTING ON SIDE OF FLAT BED WITH BEDRAILS: A LITTLE
MOVING TO AND FROM BED TO CHAIR: A LITTLE
PERSONAL GROOMING: A LITTLE
MOVING TO AND FROM BED TO CHAIR: A LITTLE
DRESSING REGULAR UPPER BODY CLOTHING: A LITTLE
MOVING TO AND FROM BED TO CHAIR: A LITTLE
DAILY ACTIVITIY SCORE: 19
DRESSING REGULAR UPPER BODY CLOTHING: A LITTLE
DRESSING REGULAR LOWER BODY CLOTHING: A LITTLE
TURNING FROM BACK TO SIDE WHILE IN FLAT BAD: A LITTLE
CLIMB 3 TO 5 STEPS WITH RAILING: A LOT
MOBILITY SCORE: 18
MOVING FROM LYING ON BACK TO SITTING ON SIDE OF FLAT BED WITH BEDRAILS: A LITTLE
STANDING UP FROM CHAIR USING ARMS: A LITTLE
TOILETING: A LITTLE
DRESSING REGULAR LOWER BODY CLOTHING: A LITTLE
HELP NEEDED FOR BATHING: A LITTLE
WALKING IN HOSPITAL ROOM: A LITTLE
MOVING FROM LYING ON BACK TO SITTING ON SIDE OF FLAT BED WITH BEDRAILS: A LITTLE
HELP NEEDED FOR BATHING: A LITTLE
DRESSING REGULAR LOWER BODY CLOTHING: A LITTLE
STANDING UP FROM CHAIR USING ARMS: A LITTLE
DRESSING REGULAR UPPER BODY CLOTHING: A LITTLE
WALKING IN HOSPITAL ROOM: A LITTLE
PERSONAL GROOMING: A LITTLE
DAILY ACTIVITIY SCORE: 19
MOBILITY SCORE: 17
CLIMB 3 TO 5 STEPS WITH RAILING: A LOT

## 2024-03-27 ASSESSMENT — PAIN SCALES - GENERAL
PAINLEVEL_OUTOF10: 5 - MODERATE PAIN
PAINLEVEL_OUTOF10: 0 - NO PAIN
PAINLEVEL_OUTOF10: 0 - NO PAIN

## 2024-03-27 ASSESSMENT — PAIN DESCRIPTION - LOCATION: LOCATION: ABDOMEN

## 2024-03-27 ASSESSMENT — PAIN - FUNCTIONAL ASSESSMENT: PAIN_FUNCTIONAL_ASSESSMENT: 0-10

## 2024-03-27 NOTE — PROGRESS NOTES
Occupational Therapy                 Therapy Communication Note    Patient Name: Jovanny Yap  MRN: [id number]  Today's Date: 3/27/2024     Discipline: Occupational Therapy    Missed Visit Reason: Missed Visit Reason:  (pt requesting OT return later, just returned to bed, will re-att as schedule permits)    Missed Time: Deloris Brown OTR/L

## 2024-03-27 NOTE — HOSPITAL COURSE
73 year old male with h/o HTN, HLD, BPH, GERD, anxiety, R hemicolectomy, incisional hernia, adhesive SBO s/p elective exploratory laparotomy, lysis of adhesions, open repair of incisional hernia with retrorectus mesh, myofascial release x2, TAP block, excision of abdominal scar on 3/22 with Dr. Espino. Procedure was tolerated well and he progressed appropriately post-operatively. He was evaluated by PT/OT with recommendation for continued rehab at discharge. Insurance denied discharge to acute rehab and he was re-evaluated by PT/OT with recommendation for SNF. Recommendations and options were discussed with family and patient and it was ultimately decided preference to discharge home with family support, nursing home care/PT/OT. Upon discharge, pain was well controlled, tolerating regular diet, ambulating, passing flatus with BM, and voiding without difficulty. He was discharged home with home PT/OT/nursing with surgical follow up in 1 week.

## 2024-03-27 NOTE — PROGRESS NOTES
Jovanny Yap is a 73 y.o. male on day 5 of admission presenting with Incisional hernia of anterior abdominal wall without obstruction or gangrene.    Subjective   Pain well controlled. Up in chair most of the day. Tolerating soft diet, would like more options on regular diet.     Objective     Physical Exam  Vitals reviewed.   Constitutional:       General: He is not in acute distress.  HENT:      Head: Normocephalic and atraumatic.   Eyes:      Extraocular Movements: Extraocular movements intact.   Cardiovascular:      Rate and Rhythm: Normal rate and regular rhythm.   Pulmonary:      Effort: Pulmonary effort is normal. No respiratory distress.      Comments: On room air   Abdominal:      General: There is no distension.      Palpations: Abdomen is soft.      Tenderness: There is no abdominal tenderness.      Comments: Midline with staples D/I, well approximated. Bilateral NATIVIDAD drains with serosanguinous output, abdominal binder in place. Bruising on lower abdomen    Skin:     General: Skin is warm and dry.   Neurological:      Mental Status: He is oriented to person, place, and time.   Psychiatric:         Mood and Affect: Mood normal.         Behavior: Behavior normal.       Last Recorded Vitals  Blood pressure (!) 164/95, pulse 71, temperature 36.9 °C (98.4 °F), temperature source Oral, resp. rate 16, height 1.829 m (6'), weight 122 kg (268 lb 15.4 oz), SpO2 94 %.  Intake/Output last 3 Shifts:  I/O last 3 completed shifts:  In: 840 (6.9 mL/kg) [P.O.:840]  Out: 195 (1.6 mL/kg) [Drains:195]  Weight: 122 kg     Relevant Results  Scheduled medications  acetaminophen, 650 mg, oral, q6h  aspirin, 81 mg, oral, Daily  cyanocobalamin, 1,000 mcg, oral, Nightly  enoxaparin, 40 mg, subcutaneous, q24h  escitalopram, 5 mg, oral, Daily  melatonin, 3 mg, oral, Nightly  pantoprazole, 40 mg, oral, Daily before breakfast  potassium chloride CR, 20 mEq, oral, Once  simvastatin, 20 mg, oral, Nightly  tamsulosin, 0.4 mg, oral,  Daily      Continuous medications       PRN medications  PRN medications: ibuprofen, naloxone, [DISCONTINUED] ondansetron ODT **OR** ondansetron           Assessment/Plan   Principal Problem:    Incisional hernia of anterior abdominal wall without obstruction or gangrene    73 year old male with h/o HTN, HLD, BPH, GERD, anxiety, R hemicolectomy, incisional hernia, adhesive SBO s/p elective exploratory laparotomy, lysis of adhesions, open repair of incisional hernia with retrorectus mesh, myofascial release x2, TAP block, excision of abdominal scar on 3/22 with Dr. Espino. Neurology consulted on 3/24 for difficulty with word finding/dizziness with negative neuro work up, likely related to medications and post-op delirium. He continues to progress appropriately from a surgical standpoint. Medically ready for discharge; Pending discharge to acute rehab.     PLAN:   Neurology: post operative pain, well controlled, h/o anxiety, post-op delirium/insomnia   - scheduled tylenol   - ibuprofen PRN   - hold narcotics  - continue home lexapro   - melatonin at bedtime     Cardiovascular: h/o HTN, HLD   -Vital signs every 4 hours  - continue home aspirin, simvastatin, lisinopril     Pulmonology:  -IS x10 every hour   -Maintain SpO2 >92 % RA    GI: h/o GERD, R hemicolectomy, incisional hernia s/p ex lap, lysis of adhesions, open repair of incisional hernia with retrorectus mesh, myofascial release x2, TAP block, excision of abdominal scar  - regular diet   - Zofran PRN nausea   - continue home PPI   - maintain abdominal binder and NATIVIDAD drains to bulb sx    :  - Strict I&O   - replace electrolytes as needed to maintain K >4, Mag >2, Phos >3. 20 K+ given today     DVT Prophylaxis:  -Continue subcutaneous lovenox, SCDs, ambulation/OOB with assistance    Disposition:  - RNF  - Continue PT/OT while inpatient; recommendation for acute rehab at discharge. Medically ready;  precert started.       Plan of care discussed with attending,   Srinivas.     Mari Brower, JOURDAN, CNP  [facility]  [id number]

## 2024-03-27 NOTE — CARE PLAN
The patient's goals for the shift include hemodynamic stability    The clinical goals for the shift include patient will remain safe from any new falls at this time.    Over the shift, the patient did not make progress toward the following goals. Barriers to progression include calling for assistance when ambulating . Recommendations to address these barriers include monitor patient more frequently and assess the need for toilet ing and ambulation.

## 2024-03-28 ENCOUNTER — APPOINTMENT (OUTPATIENT)
Dept: RADIOLOGY | Facility: HOSPITAL | Age: 74
DRG: 354 | End: 2024-03-28
Payer: MEDICARE

## 2024-03-28 ENCOUNTER — APPOINTMENT (OUTPATIENT)
Dept: CARDIOLOGY | Facility: HOSPITAL | Age: 74
DRG: 354 | End: 2024-03-28
Payer: MEDICARE

## 2024-03-28 PROCEDURE — 99024 POSTOP FOLLOW-UP VISIT: CPT | Performed by: NURSE PRACTITIONER

## 2024-03-28 PROCEDURE — 93005 ELECTROCARDIOGRAM TRACING: CPT

## 2024-03-28 PROCEDURE — 74018 RADEX ABDOMEN 1 VIEW: CPT

## 2024-03-28 PROCEDURE — 74018 RADEX ABDOMEN 1 VIEW: CPT | Performed by: RADIOLOGY

## 2024-03-28 PROCEDURE — 2500000002 HC RX 250 W HCPCS SELF ADMINISTERED DRUGS (ALT 637 FOR MEDICARE OP, ALT 636 FOR OP/ED)

## 2024-03-28 PROCEDURE — 2500000001 HC RX 250 WO HCPCS SELF ADMINISTERED DRUGS (ALT 637 FOR MEDICARE OP): Performed by: NURSE PRACTITIONER

## 2024-03-28 PROCEDURE — 2500000004 HC RX 250 GENERAL PHARMACY W/ HCPCS (ALT 636 FOR OP/ED): Performed by: NURSE PRACTITIONER

## 2024-03-28 PROCEDURE — 2500000001 HC RX 250 WO HCPCS SELF ADMINISTERED DRUGS (ALT 637 FOR MEDICARE OP)

## 2024-03-28 PROCEDURE — 1100000001 HC PRIVATE ROOM DAILY

## 2024-03-28 PROCEDURE — 2500000004 HC RX 250 GENERAL PHARMACY W/ HCPCS (ALT 636 FOR OP/ED): Performed by: STUDENT IN AN ORGANIZED HEALTH CARE EDUCATION/TRAINING PROGRAM

## 2024-03-28 RX ORDER — BISACODYL 10 MG/1
10 SUPPOSITORY RECTAL ONCE
Status: COMPLETED | OUTPATIENT
Start: 2024-03-28 | End: 2024-03-28

## 2024-03-28 RX ORDER — POLYETHYLENE GLYCOL 3350 17 G/17G
17 POWDER, FOR SOLUTION ORAL DAILY
Status: DISCONTINUED | OUTPATIENT
Start: 2024-03-28 | End: 2024-03-30 | Stop reason: HOSPADM

## 2024-03-28 RX ADMIN — ACETAMINOPHEN 650 MG: 325 TABLET ORAL at 07:57

## 2024-03-28 RX ADMIN — POLYETHYLENE GLYCOL 3350 17 G: 17 POWDER, FOR SOLUTION ORAL at 11:24

## 2024-03-28 RX ADMIN — PROMETHAZINE HYDROCHLORIDE 6.25 MG: 25 INJECTION INTRAMUSCULAR; INTRAVENOUS at 11:28

## 2024-03-28 RX ADMIN — ACETAMINOPHEN 650 MG: 325 TABLET ORAL at 20:04

## 2024-03-28 RX ADMIN — BISACODYL 10 MG: 10 SUPPOSITORY RECTAL at 11:24

## 2024-03-28 RX ADMIN — ASPIRIN 81 MG: 81 TABLET, COATED ORAL at 08:00

## 2024-03-28 RX ADMIN — SIMVASTATIN 20 MG: 20 TABLET, FILM COATED ORAL at 20:04

## 2024-03-28 RX ADMIN — ENOXAPARIN SODIUM 40 MG: 100 INJECTION SUBCUTANEOUS at 14:26

## 2024-03-28 RX ADMIN — ESCITALOPRAM 5 MG: 5 TABLET, FILM COATED ORAL at 08:00

## 2024-03-28 RX ADMIN — PROMETHAZINE HYDROCHLORIDE 6.25 MG: 25 INJECTION INTRAMUSCULAR; INTRAVENOUS at 20:19

## 2024-03-28 RX ADMIN — CYANOCOBALAMIN TAB 1000 MCG 1000 MCG: 1000 TAB at 20:04

## 2024-03-28 RX ADMIN — LISINOPRIL 10 MG: 10 TABLET ORAL at 08:00

## 2024-03-28 RX ADMIN — ACETAMINOPHEN 650 MG: 325 TABLET ORAL at 01:56

## 2024-03-28 RX ADMIN — ONDANSETRON 4 MG: 2 INJECTION INTRAMUSCULAR; INTRAVENOUS at 17:04

## 2024-03-28 RX ADMIN — MELATONIN 3 MG: 3 TAB ORAL at 20:04

## 2024-03-28 RX ADMIN — PANTOPRAZOLE SODIUM 40 MG: 40 TABLET, DELAYED RELEASE ORAL at 06:02

## 2024-03-28 RX ADMIN — ONDANSETRON 4 MG: 2 INJECTION INTRAMUSCULAR; INTRAVENOUS at 07:57

## 2024-03-28 RX ADMIN — TAMSULOSIN HYDROCHLORIDE 0.4 MG: 0.4 CAPSULE ORAL at 08:00

## 2024-03-28 ASSESSMENT — COGNITIVE AND FUNCTIONAL STATUS - GENERAL
WALKING IN HOSPITAL ROOM: A LITTLE
MOVING TO AND FROM BED TO CHAIR: A LITTLE
CLIMB 3 TO 5 STEPS WITH RAILING: A LITTLE
TURNING FROM BACK TO SIDE WHILE IN FLAT BAD: A LITTLE
MOBILITY SCORE: 18
STANDING UP FROM CHAIR USING ARMS: A LITTLE
MOVING FROM LYING ON BACK TO SITTING ON SIDE OF FLAT BED WITH BEDRAILS: A LITTLE

## 2024-03-28 ASSESSMENT — PAIN SCALES - GENERAL
PAINLEVEL_OUTOF10: 4
PAINLEVEL_OUTOF10: 4

## 2024-03-28 ASSESSMENT — PAIN - FUNCTIONAL ASSESSMENT: PAIN_FUNCTIONAL_ASSESSMENT: 0-10

## 2024-03-28 NOTE — PROGRESS NOTES
Discharge Planning note:    Jovanny Yap is a 73 y.o. male on day 6 of admission presenting with Incisional hernia of anterior abdominal wall without obstruction or gangrene.       AR Denied. P2P can be set up by calling [phone] opt 4 by 3/29/24 at 12p EST. Denial reason no clear benefit of IPR over a LLOC setting. Needs can be met at a [facility]. The team made aware.       Addendum 3/28/24 @ 249pm Call placed to the wife informed of the P2P and if denied would need a [facility]. Sent a list of SNF choices to [email or web address] asked to choose 3 places.       Addendum 3/28/24 @ 314 pm The P2P was upheld with the denial. Pending SNF choices . Call placed to his wife left a message for choices.     Addendum 3/28/24 @ ;415pm Wife called back gave choices sent for referral. Listed in [facility], [facility] and [facility].       Addendum 3/29/24 @ 955 am Maximilian can accept call placed to patients wife and in agreement. Asked the SNF to start the precert for admission.     Addendum 3/29/24 157 pm per the team, patient and wife wants home with homecare not SNF placement. Made homecare aware and the SNF.          Saritha Nguyen RN TCC

## 2024-03-28 NOTE — PROGRESS NOTES
Physical Therapy    Therapy Communication Note    Patient Name: Jovanny Yap  MRN: [id number]  Today's Date: 3/28/2024     Discipline: Physical Therapy      Missed Visit: Yes  Missed Visit Reason: Patient refused (c/o nausea)      03/28/24 at 10:26 AM   Jami Kee, PT

## 2024-03-28 NOTE — CARE PLAN
The patient's goals for the shift include hemodynamic stability    The clinical goals for the shift include safe from falls

## 2024-03-28 NOTE — PROGRESS NOTES
Jovanny Yap is a 73 y.o. male on day 6 of admission presenting with Incisional hernia of anterior abdominal wall without obstruction or gangrene.    Subjective   No issues overnight. Slept well overnight. Awaiting discharge to rehab.     Objective     Physical Exam  Vitals reviewed.   Constitutional:       General: He is not in acute distress.  HENT:      Head: Normocephalic and atraumatic.   Eyes:      Extraocular Movements: Extraocular movements intact.   Cardiovascular:      Rate and Rhythm: Normal rate and regular rhythm.   Pulmonary:      Effort: Pulmonary effort is normal. No respiratory distress.      Comments: On room air   Abdominal:      General: There is no distension.      Palpations: Abdomen is soft.      Tenderness: There is no abdominal tenderness.      Comments: Midline with staples D/I, well approximated. Bilateral NATIVIDAD drains with serosanguinous output, abdominal binder in place. Bruising on lower abdomen    Skin:     General: Skin is warm and dry.   Neurological:      Mental Status: He is oriented to person, place, and time.   Psychiatric:         Mood and Affect: Mood normal.         Behavior: Behavior normal.       Last Recorded Vitals  Blood pressure 123/69, pulse 71, temperature 36.2 °C (97.2 °F), temperature source Temporal, resp. rate 18, height 1.829 m (6'), weight 122 kg (268 lb 15.4 oz), SpO2 95 %.  Intake/Output last 3 Shifts:  I/O last 3 completed shifts:  In: - (0 mL/kg)   Out: 135 (1.1 mL/kg) [Drains:135]  Weight: 122 kg     Relevant Results  Scheduled medications  acetaminophen, 650 mg, oral, q6h  aspirin, 81 mg, oral, Daily  cyanocobalamin, 1,000 mcg, oral, Nightly  enoxaparin, 40 mg, subcutaneous, q24h  escitalopram, 5 mg, oral, Daily  lisinopril, 10 mg, oral, Daily  melatonin, 3 mg, oral, Nightly  pantoprazole, 40 mg, oral, Daily before breakfast  simvastatin, 20 mg, oral, Nightly  tamsulosin, 0.4 mg, oral, Daily      Continuous medications       PRN medications  PRN medications:  ibuprofen, naloxone, [DISCONTINUED] ondansetron ODT **OR** ondansetron           Assessment/Plan   Principal Problem:    Incisional hernia of anterior abdominal wall without obstruction or gangrene    73 year old male with h/o HTN, HLD, BPH, GERD, anxiety, R hemicolectomy, incisional hernia, adhesive SBO s/p elective exploratory laparotomy, lysis of adhesions, open repair of incisional hernia with retrorectus mesh, myofascial release x2, TAP block, excision of abdominal scar on 3/22 with Dr. Espino. Neurology consulted on 3/24 for difficulty with word finding/dizziness with negative neuro work up, likely related to medications and post-op delirium. He continues to progress appropriately from a surgical standpoint. Medically ready for discharge; Pending discharge to acute rehab.     PLAN:   Neurology: post operative pain, well controlled, h/o anxiety, post-op delirium/insomnia-resolved   - scheduled tylenol   - ibuprofen PRN   - hold narcotics  - continue home lexapro   - melatonin at bedtime     Cardiovascular: h/o HTN, HLD   -Vital signs every 4 hours  - continue home aspirin, simvastatin, lisinopril     Pulmonology:  -IS x10 every hour   -Maintain SpO2 >92 % RA    GI: h/o GERD, R hemicolectomy, incisional hernia s/p ex lap, lysis of adhesions, open repair of incisional hernia with retrorectus mesh, myofascial release x2, TAP block, excision of abdominal scar  - regular diet   - Zofran PRN nausea   - continue home PPI   - maintain abdominal binder and NATIVIDAD drains to bulb sx    :  - Strict I&O   - replace electrolytes as needed    DVT Prophylaxis:  -Continue subcutaneous lovenox, SCDs, ambulation/OOB with assistance    Disposition:  - RNF  - Continue PT/OT while inpatient; recommendation for acute rehab at discharge. Medically ready;  precert started.       Plan of care discussed with attending, Dr. Espino.     Mari Brower, APRN, CNP  [facility]  [id number]

## 2024-03-29 ENCOUNTER — DOCUMENTATION (OUTPATIENT)
Dept: HOME HEALTH SERVICES | Facility: HOME HEALTH | Age: 74
End: 2024-03-29
Payer: MEDICARE

## 2024-03-29 ENCOUNTER — HOME HEALTH ADMISSION (OUTPATIENT)
Dept: HOME HEALTH SERVICES | Facility: HOME HEALTH | Age: 74
End: 2024-03-29
Payer: MEDICARE

## 2024-03-29 PROCEDURE — 97530 THERAPEUTIC ACTIVITIES: CPT | Mod: GP

## 2024-03-29 PROCEDURE — 99024 POSTOP FOLLOW-UP VISIT: CPT | Performed by: NURSE PRACTITIONER

## 2024-03-29 PROCEDURE — 2500000001 HC RX 250 WO HCPCS SELF ADMINISTERED DRUGS (ALT 637 FOR MEDICARE OP)

## 2024-03-29 PROCEDURE — 2500000002 HC RX 250 W HCPCS SELF ADMINISTERED DRUGS (ALT 637 FOR MEDICARE OP, ALT 636 FOR OP/ED)

## 2024-03-29 PROCEDURE — 2500000001 HC RX 250 WO HCPCS SELF ADMINISTERED DRUGS (ALT 637 FOR MEDICARE OP): Performed by: NURSE PRACTITIONER

## 2024-03-29 PROCEDURE — 1100000001 HC PRIVATE ROOM DAILY

## 2024-03-29 PROCEDURE — 2500000004 HC RX 250 GENERAL PHARMACY W/ HCPCS (ALT 636 FOR OP/ED): Performed by: STUDENT IN AN ORGANIZED HEALTH CARE EDUCATION/TRAINING PROGRAM

## 2024-03-29 PROCEDURE — 97535 SELF CARE MNGMENT TRAINING: CPT | Mod: GO

## 2024-03-29 PROCEDURE — 2500000004 HC RX 250 GENERAL PHARMACY W/ HCPCS (ALT 636 FOR OP/ED): Performed by: NURSE PRACTITIONER

## 2024-03-29 RX ORDER — DEXTROSE MONOHYDRATE AND SODIUM CHLORIDE 5; .45 G/100ML; G/100ML
50 INJECTION, SOLUTION INTRAVENOUS CONTINUOUS
Status: DISCONTINUED | OUTPATIENT
Start: 2024-03-29 | End: 2024-03-29

## 2024-03-29 RX ORDER — NYSTATIN 100000 [USP'U]/G
1 POWDER TOPICAL 2 TIMES DAILY
Status: DISCONTINUED | OUTPATIENT
Start: 2024-03-29 | End: 2024-03-29

## 2024-03-29 RX ORDER — NYSTATIN 100000 U/G
CREAM TOPICAL 2 TIMES DAILY
Status: DISCONTINUED | OUTPATIENT
Start: 2024-03-29 | End: 2024-03-30 | Stop reason: HOSPADM

## 2024-03-29 RX ORDER — SODIUM CHLORIDE AND POTASSIUM CHLORIDE 150; 450 MG/100ML; MG/100ML
75 INJECTION, SOLUTION INTRAVENOUS CONTINUOUS
Status: DISCONTINUED | OUTPATIENT
Start: 2024-03-29 | End: 2024-03-29

## 2024-03-29 RX ADMIN — NYSTATIN: 100000 CREAM TOPICAL at 21:44

## 2024-03-29 RX ADMIN — ACETAMINOPHEN 650 MG: 325 TABLET ORAL at 21:44

## 2024-03-29 RX ADMIN — NYSTATIN 1 APPLICATION: 100000 POWDER TOPICAL at 12:37

## 2024-03-29 RX ADMIN — ONDANSETRON 4 MG: 2 INJECTION INTRAMUSCULAR; INTRAVENOUS at 02:32

## 2024-03-29 RX ADMIN — ONDANSETRON 4 MG: 2 INJECTION INTRAMUSCULAR; INTRAVENOUS at 08:32

## 2024-03-29 RX ADMIN — ONDANSETRON 4 MG: 2 INJECTION INTRAMUSCULAR; INTRAVENOUS at 18:02

## 2024-03-29 RX ADMIN — ACETAMINOPHEN 650 MG: 325 TABLET ORAL at 08:32

## 2024-03-29 RX ADMIN — ACETAMINOPHEN 650 MG: 325 TABLET ORAL at 02:32

## 2024-03-29 RX ADMIN — LISINOPRIL 10 MG: 10 TABLET ORAL at 08:32

## 2024-03-29 RX ADMIN — MELATONIN 3 MG: 3 TAB ORAL at 21:44

## 2024-03-29 RX ADMIN — ASPIRIN 81 MG: 81 TABLET, COATED ORAL at 08:32

## 2024-03-29 RX ADMIN — PANTOPRAZOLE SODIUM 40 MG: 40 TABLET, DELAYED RELEASE ORAL at 06:27

## 2024-03-29 RX ADMIN — ESCITALOPRAM 5 MG: 5 TABLET, FILM COATED ORAL at 08:32

## 2024-03-29 RX ADMIN — ACETAMINOPHEN 650 MG: 325 TABLET ORAL at 14:14

## 2024-03-29 RX ADMIN — SIMVASTATIN 20 MG: 20 TABLET, FILM COATED ORAL at 21:47

## 2024-03-29 RX ADMIN — TAMSULOSIN HYDROCHLORIDE 0.4 MG: 0.4 CAPSULE ORAL at 08:32

## 2024-03-29 RX ADMIN — ENOXAPARIN SODIUM 40 MG: 100 INJECTION SUBCUTANEOUS at 14:14

## 2024-03-29 RX ADMIN — POLYETHYLENE GLYCOL 3350 17 G: 17 POWDER, FOR SOLUTION ORAL at 08:32

## 2024-03-29 ASSESSMENT — COGNITIVE AND FUNCTIONAL STATUS - GENERAL
TOILETING: A LITTLE
HELP NEEDED FOR BATHING: A LITTLE
STANDING UP FROM CHAIR USING ARMS: A LITTLE
DRESSING REGULAR LOWER BODY CLOTHING: A LITTLE
STANDING UP FROM CHAIR USING ARMS: A LITTLE
MOVING FROM LYING ON BACK TO SITTING ON SIDE OF FLAT BED WITH BEDRAILS: A LITTLE
DRESSING REGULAR UPPER BODY CLOTHING: A LITTLE
TOILETING: A LITTLE
PERSONAL GROOMING: A LITTLE
MOVING TO AND FROM BED TO CHAIR: A LITTLE
HELP NEEDED FOR BATHING: A LITTLE
EATING MEALS: A LITTLE
MOVING FROM LYING ON BACK TO SITTING ON SIDE OF FLAT BED WITH BEDRAILS: A LITTLE
EATING MEALS: A LITTLE
TURNING FROM BACK TO SIDE WHILE IN FLAT BAD: A LOT
WALKING IN HOSPITAL ROOM: A LITTLE
MOBILITY SCORE: 17
CLIMB 3 TO 5 STEPS WITH RAILING: A LITTLE
WALKING IN HOSPITAL ROOM: A LITTLE
DAILY ACTIVITIY SCORE: 18
DAILY ACTIVITIY SCORE: 18
CLIMB 3 TO 5 STEPS WITH RAILING: A LITTLE
MOVING TO AND FROM BED TO CHAIR: A LITTLE
DRESSING REGULAR LOWER BODY CLOTHING: A LITTLE
PERSONAL GROOMING: A LITTLE
MOBILITY SCORE: 17
DRESSING REGULAR UPPER BODY CLOTHING: A LITTLE
TURNING FROM BACK TO SIDE WHILE IN FLAT BAD: A LOT

## 2024-03-29 ASSESSMENT — ACTIVITIES OF DAILY LIVING (ADL): HOME_MANAGEMENT_TIME_ENTRY: 17

## 2024-03-29 ASSESSMENT — PAIN - FUNCTIONAL ASSESSMENT
PAIN_FUNCTIONAL_ASSESSMENT: 0-10

## 2024-03-29 ASSESSMENT — PAIN SCALES - GENERAL
PAINLEVEL_OUTOF10: 3
PAINLEVEL_OUTOF10: 0 - NO PAIN

## 2024-03-29 NOTE — PROGRESS NOTES
Physical Therapy    Physical Therapy Treatment    Patient Name: Jovanny Yap  MRN: [id number]  Today's Date: 3/29/2024  Time Calculation  Start Time: 0939  Stop Time: 0956  Time Calculation (min): 17 min       Assessment/Plan   PT Assessment  End of Session Communication: Bedside nurse  Assessment Comment: Pt continuing to progress, but fatigues easily. Pt remains appropriate for mod intensity PT.  End of Session Patient Position: Up in chair, Alarm off, not on at start of session     PT Plan  Treatment/Interventions: Bed mobility, Transfer training, Gait training, Balance training, Strengthening, Endurance training, Therapeutic exercise, Therapeutic activity  PT Plan: Skilled PT  PT Frequency: 5 times per week  PT Discharge Recommendations: Moderate intensity level of continued care  Equipment Recommended upon Discharge:  (to be determined)  PT Recommended Transfer Status: Assist x1  PT - OK to Discharge: Yes      General Visit Information:   PT  Visit  PT Received On: 03/29/24  General  Family/Caregiver Present: Yes (daughter present)  Caregiver Feedback: daughter unsure if mother will be able to assist pt and wants to further discuss d/c with family  Co-Treatment: OT  Co-Treatment Reason: to maximize  mobility and safety  Prior to Session Communication: Bedside nurse  Patient Position Received: Bed, 3 rail up, Alarm off, not on at start of session    Subjective   Precautions:  Precautions  Medical Precautions: Abdominal precautions, Fall precautions       Objective   Pain:  Pain Assessment  Pain Assessment: 0-10  Pain Score: 0 - No pain       Postural Control:  Static Sitting Balance  Static Sitting-Level of Assistance: Distant supervision  Dynamic Sitting Balance  Dynamic Sitting-Comments: SBA  Static Standing Balance  Static Standing-Comment/Number of Minutes: CGA without device  Dynamic Standing Balance  Dynamic Standing-Comments: CGA/min assist without device       Treatments:  Bed Mobility  Bed Mobility:  Yes  Bed Mobility 1  Bed Mobility 1: Supine to sitting  Level of Assistance 1: Minimum assistance  Bed Mobility Comments 1: HOB elevated, verbal cues for logroll, use of bed rails    Ambulation/Gait Training  Ambulation/Gait Training Performed: Yes  Ambulation/Gait Training 1  Device 1: No device  Assistance 1: Contact guard  Quality of Gait 1: Decreased step length (decreased alana, mild unsteadiness, no LOB)  Comments/Distance (ft) 1: 100 feet x1; 10 feet x2 to/from bathroom (c/o fatigue)  Transfers  Transfer: Yes  Transfer 1  Technique 1: Sit to stand, Stand to sit  Transfer Level of Assistance 1: Contact guard  Trials/Comments 1: verbal cues; stood 1x    Outcome Measures:  [facility] Basic Mobility  Turning from your back to your side while in a flat bed without using bedrails: A little  Moving from lying on your back to sitting on the side of a flat bed without using bedrails: A lot  Moving to and from bed to chair (including a wheelchair): A little  Standing up from a chair using your arms (e.g. wheelchair or bedside chair): A little  To walk in hospital room: A little  Climbing 3-5 steps with railing: A little  Basic Mobility - Total Score: 17    Education Documentation  Precautions, taught by Jami Kee, PT at 3/29/2024 11:04 AM.  Learner: Patient  Readiness: Acceptance  Method: Explanation  Response: Verbalizes Understanding    Body Mechanics, taught by Jami Kee, PT at 3/29/2024 11:04 AM.  Learner: Patient  Readiness: Acceptance  Method: Explanation  Response: Verbalizes Understanding    Mobility Training, taught by Jami Kee PT at 3/29/2024 11:04 AM.  Learner: Patient  Readiness: Acceptance  Method: Explanation  Response: Verbalizes Understanding    Education Comments  No comments found.           Encounter Problems       Encounter Problems (Active)       PT Problem       Patient will complete supine to sit and sit to supine Independent  (Progressing)       Start:  03/24/24     Expected End:  04/07/24            Patient will perform sit<>stand transfer with Rolling Walker, and Modified Independent  (Progressing)       Start:  03/24/24    Expected End:  04/07/24            Patient will ambulate >150' with Rolling Walker and Modified Independent  (Progressing)       Start:  03/24/24    Expected End:  04/07/24            Patient will verbalize and demonstrate abdominal precautions independently.  (Progressing)       Start:  03/24/24    Expected End:  04/07/24            Pt able to demo static standing IND with LRAD x 5 min (Progressing)       Start:  03/24/24    Expected End:  04/07/24               Pain - Adult                03/29/24 at 11:10 AM  Jami Kee, PT

## 2024-03-29 NOTE — CARE PLAN
The patient's goals for the shift include pain control.    The clinical goals for the shift include pain and nausea control throughout shift      Problem: Pain - Adult  Goal: Verbalizes/displays adequate comfort level or baseline comfort level  Outcome: Progressing     Problem: Safety - Adult  Goal: Free from fall injury  Outcome: Progressing     Problem: Discharge Planning  Goal: Discharge to home or other facility with appropriate resources  Outcome: Progressing     Problem: Chronic Conditions and Co-morbidities  Goal: Patient's chronic conditions and co-morbidity symptoms are monitored and maintained or improved  Outcome: Progressing     Problem: Skin  Goal: Decreased wound size/increased tissue granulation at next dressing change  Outcome: Progressing  Goal: Participates in plan/prevention/treatment measures  Outcome: Progressing  Goal: Prevent/manage excess moisture  Outcome: Progressing

## 2024-03-29 NOTE — PROGRESS NOTES
Jovanny Yap is a 73 y.o. male on day 7 of admission presenting with Incisional hernia of anterior abdominal wall without obstruction or gangrene.    Subjective   Nausea yesterday that has improved this AM. Passing flatus, reports had a BM yesterday after miralax/suppository. Pain well controlled. Little PO intake yesterday because of nausea, but planning to order breakfast.     Objective     Physical Exam  Vitals reviewed.   Constitutional:       General: He is not in acute distress.  HENT:      Head: Normocephalic and atraumatic.   Eyes:      Extraocular Movements: Extraocular movements intact.   Cardiovascular:      Rate and Rhythm: Normal rate and regular rhythm.   Pulmonary:      Effort: Pulmonary effort is normal. No respiratory distress.      Comments: On room air   Abdominal:      General: There is no distension.      Palpations: Abdomen is soft.      Tenderness: There is no abdominal tenderness.      Comments: Midline with staples D/I, well approximated. L NATIVIDAD drain with serosanguinous output, abdominal binder in place. Bruising on lower abdomen    Skin:     General: Skin is warm and dry.   Neurological:      Mental Status: He is oriented to person, place, and time.   Psychiatric:         Mood and Affect: Mood normal.         Behavior: Behavior normal.       Last Recorded Vitals  Blood pressure 131/84, pulse 74, temperature 35.9 °C (96.6 °F), temperature source Temporal, resp. rate 18, height 1.829 m (6'), weight 122 kg (268 lb 15.4 oz), SpO2 92 %.  Intake/Output last 3 Shifts:  I/O last 3 completed shifts:  In: 60 (0.5 mL/kg) [P.O.:60]  Out: 107.5 (0.9 mL/kg) [Drains:107.5]  Weight: 122 kg     Relevant Results  Scheduled medications  acetaminophen, 650 mg, oral, q6h  aspirin, 81 mg, oral, Daily  enoxaparin, 40 mg, subcutaneous, q24h  escitalopram, 5 mg, oral, Daily  lisinopril, 10 mg, oral, Daily  melatonin, 3 mg, oral, Nightly  pantoprazole, 40 mg, oral, Daily before breakfast  polyethylene glycol, 17 g,  oral, Daily  simvastatin, 20 mg, oral, Nightly  tamsulosin, 0.4 mg, oral, Daily      Continuous medications  dextrose 5%-0.45 % sodium chloride, 50 mL/hr      PRN medications  PRN medications: ibuprofen, naloxone, [DISCONTINUED] ondansetron ODT **OR** ondansetron, promethazine           Assessment/Plan   Principal Problem:    Incisional hernia of anterior abdominal wall without obstruction or gangrene    73 year old male with h/o HTN, HLD, BPH, GERD, anxiety, R hemicolectomy, incisional hernia, adhesive SBO s/p elective exploratory laparotomy, lysis of adhesions, open repair of incisional hernia with retrorectus mesh, myofascial release x2, TAP block, excision of abdominal scar on 3/22 with Dr. Espino. Neurology consulted on 3/24 for difficulty with word finding/dizziness with negative neuro work up, likely related to medications and post-op delirium. He continues to progress appropriately from a surgical standpoint. Pending discharge to SNF.     PLAN:   Neurology: post operative pain, well controlled, h/o anxiety, post-op delirium/insomnia-resolved   - scheduled tylenol   - ibuprofen PRN   - hold narcotics  - continue home lexapro   - melatonin at bedtime     Cardiovascular: h/o HTN, HLD   -Vital signs every 4 hours  - continue home aspirin, simvastatin, lisinopril     Pulmonology:  -IS x10 every hour   -Maintain SpO2 >92 % RA    GI: h/o GERD, R hemicolectomy, incisional hernia s/p ex lap, lysis of adhesions, open repair of incisional hernia with retrorectus mesh, myofascial release x2, TAP block, excision of abdominal scar  - regular diet + ensure supplements   - Zofran PRN nausea   - continue home PPI   - maintain abdominal binder and NATIVIDAD drain to bulb sx  - miralax daily     :  - Strict I&O   - replace electrolytes as needed    DVT Prophylaxis:  -Continue subcutaneous lovenox, SCDs, ambulation/OOB with assistance    Disposition:  - RNF  - Continue PT/OT while inpatient; Insurance denial for acute rehab. Process  started for acceptance to SNF.       Plan of care discussed with surgical team; attending, Dr. Espino.     Mari Brower, APRN, CNP  [facility]  [phone]

## 2024-03-29 NOTE — PROGRESS NOTES
Occupational Therapy    OT Treatment    Patient Name: Jovanny Yap  MRN: [id number]  Today's Date: 3/29/2024  Time Calculation  Start Time: 0939  Stop Time: 0956  Time Calculation (min): 17 min         Assessment:  Medical Staff Made Aware: Yes  End of Session Communication: Bedside nurse  End of Session Patient Position: Up in chair, Alarm off, not on at start of session  Medical Staff Made Aware: Yes  Plan:  Treatment Interventions: ADL retraining, Functional transfer training, Compensatory technique education  OT Frequency: 3 times per week  OT Discharge Recommendations: High intensity level of continued care  OT Recommended Transfer Status: Assistive equipment (Comment), Assist of 1  OT - OK to Discharge: Yes (indicates completed eval and discharge recommendation)  Treatment Interventions: ADL retraining, Functional transfer training, Compensatory technique education    Subjective   Previous Visit Info:  OT Last Visit  OT Received On: 03/29/24  General:  General  Family/Caregiver Present: Yes  Caregiver Feedback: pt's daughter was present at bedside.  Co-Treatment: PT  Co-Treatment Reason: to maximize  mobility and safety  Prior to Session Communication: Bedside nurse  Patient Position Received: Bed, 3 rail up, Alarm off, not on at start of session  Preferred Learning Style: verbal  Precautions:  Medical Precautions: Abdominal precautions, Fall precautions  Vital Signs:     Pain:  Pain Assessment  Pain Assessment: 0-10  Pain Score: 0 - No pain    Objective    Cognition:  Cognition  Overall Cognitive Status: Within Functional Limits  Coordination:     Activities of Daily Living: UE Dressing  UE Dressing Level of Assistance: Setup  UE Dressing Where Assessed: Edge of bed    LE Dressing  LE Dressing: Yes  Pants Level of Assistance: Minimum assistance, Minimal verbal cues  LE Dressing Where Assessed: Edge of bed  Functional Standing Tolerance:     Bed Mobility/Transfers: Bed Mobility  Bed Mobility: Yes  Bed Mobility  1  Bed Mobility 1: Supine to sitting  Level of Assistance 1: Minimum assistance  Bed Mobility Comments 1: HOB elevated.    Transfers  Transfer: Yes  Transfer 1  Transfer From 1: Bed to  Transfer to 1: Stand  Technique 1: Sit to stand, Stand to sit  Transfer Level of Assistance 1: Contact guard, Minimal verbal cues  Transfers 2  Transfer From 2: Bed to  Transfer to 2: Chair with arms  Technique 2: Sit to stand  Transfer Level of Assistance 2: Contact guard         Outcome Measures:[facility] Daily Activity  Putting on and taking off regular lower body clothing: A little  Bathing (including washing, rinsing, drying): A little  Putting on and taking off regular upper body clothing: A little  Toileting, which includes using toilet, bedpan or urinal: A little  Taking care of personal grooming such as brushing teeth: A little  Eating Meals: A little  Daily Activity - Total Score: 18        Education Documentation  Precautions, taught by Ziyad Link OT at 3/29/2024  1:45 PM.  Learner: Patient  Readiness: Acceptance  Method: Explanation  Response: Verbalizes Understanding    ADL Training, taught by Ziyad Link OT at 3/29/2024  1:45 PM.  Learner: Patient  Readiness: Acceptance  Method: Explanation  Response: Verbalizes Understanding    Education Comments  No comments found.        OP EDUCATION:       Goals:  Encounter Problems       Encounter Problems (Active)       ADLs       Patient with complete lower body dressing with supervision level of assistance donning and doffing all LE clothes  (Progressing)       Start:  03/25/24    Expected End:  04/08/24            Patient will complete toileting including hygiene clothing management/hygiene with supervision level of assistance and DME prn. (Progressing)       Start:  03/25/24    Expected End:  04/08/24               BALANCE       Pt will maintain dynamic standing balance >8minutes during ADL task with supervision level of assistance in order to demonstrate decreased risk  of falling and improved postural control. (Progressing)       Start:  03/25/24    Expected End:  04/08/24               COGNITION/SAFETY       Patient will recall and adhere to abdominal precautions during all functional mobility/ADL tasks in order to demonstrate improved understanding and promote healing post op (Progressing)       Start:  03/25/24    Expected End:  04/08/24               MOBILITY       Patient will perform Functional mobility mod  Household distances/Community Distances with stand by assist level of assistance and least restrictive device in order to improve safety and functional mobility. (Progressing)       Start:  03/25/24    Expected End:  04/08/24               TRANSFERS       Patient will perform bed mobility stand by assist level of assistance  in order to improve safety and independence with mobility (Progressing)       Start:  03/25/24    Expected End:  04/08/24            Patient will complete functional transfers with least restrictive device with supervision level of assistance. (Progressing)       Start:  03/25/24    Expected End:  04/08/24                   Ziyad Link OTR/L, OTD

## 2024-03-29 NOTE — HH CARE COORDINATION
Home Care received a Referral for Nursing, Physical Therapy, and Occupational Therapy. We have processed the referral for a Start of Care on 3/31.     If you have any questions or concerns, please feel free to contact us at [phone]. Follow the prompts, enter your five digit zip code, and you will be directed to your care team on [facility] 2.

## 2024-03-29 NOTE — PROGRESS NOTES
Discharge planning note:      Jovanny Yap is a 73 y.o. male on day 7 of admission presenting with Incisional hernia of anterior abdominal wall without obstruction or gangrene.          SOC for [facility] homecare 3/31/24.         Saritha Nguyen RNTCC

## 2024-03-29 NOTE — PROGRESS NOTES
Physical Therapy    Physical Therapy Treatment    Patient Name: Jovanny Yap  MRN: [id number]  Today's Date: 3/29/2024  Time Calculation  Start Time: 1430  Stop Time: 1443  Time Calculation (min): 13 min       Assessment/Plan   PT Assessment  End of Session Communication: Bedside nurse  Assessment Comment: Pt continuing to progress, but fatigues easily. Pt remains appropriate for mod intensity PT.  End of Session Patient Position: Bed, 3 rail up, Alarm off, not on at start of session     PT Plan  Treatment/Interventions: Bed mobility, Transfer training, Gait training, Balance training, Strengthening, Endurance training, Therapeutic exercise, Therapeutic activity  PT Plan: Skilled PT  PT Frequency: 5 times per week  PT Discharge Recommendations: Moderate intensity level of continued care  PT Recommended Transfer Status: Assist x1  PT - OK to Discharge: Yes    General Visit Information:   PT  Visit  PT Received On: 03/29/24  General  Family/Caregiver Present: Yes (daughter present)  Caregiver Feedback: daughter unsure if mother will be able to assist pt and wants to further discuss d/c with family  Co-Treatment: OT  Co-Treatment Reason: to maximize  mobility and safety  Prior to Session Communication: Bedside nurse  Patient Position Received: Bed, 3 rail up, Alarm off, not on at start of session  General Comment: pt seen for a second visit for additional bed mobility training prior to d/c home per family request    Subjective   Precautions:  Precautions  Medical Precautions: Abdominal precautions, Fall precautions       Objective   Pain:  Pain Assessment  Pain Assessment:  (no c/o pain)  Pain Score: 0 - No pain     Postural Control:  Static Sitting Balance  Static Sitting-Level of Assistance: Distant supervision  Dynamic Sitting Balance  Dynamic Sitting-Comments: SBA  Static Standing Balance  Static Standing-Comment/Number of Minutes: CGA  Dynamic Standing Balance  Dynamic Standing-Comments: CGA    Treatments:  Bed  Mobility  Bed Mobility: Yes  Bed Mobility 1  Bed Mobility 1: Supine to sitting, Sitting to supine  Level of Assistance 1: Contact guard  Bed Mobility Comments 1: HOB flat, verbal cues for logroll, use of bed rail (wife reports plan to purchase bed rails); peformed supine <->sit x2 trials    Ambulation/Gait Training  Ambulation/Gait Training Performed: Yes  Ambulation/Gait Training 1  Device 1: No device  Assistance 1: Contact guard  Quality of Gait 1: Decreased step length (decreased alana, occasional use of wall for support; advised pt to use a walker at home)  Comments/Distance (ft) 1: 10 feet x2 to/from bathroom  Transfers  Transfer: Yes  Transfer 1  Technique 1: Sit to stand, Stand to sit  Transfer Level of Assistance 1: Contact guard  Trials/Comments 1: verbal cues    Outcome Measures:  [facility] Basic Mobility  Turning from your back to your side while in a flat bed without using bedrails: A little  Moving from lying on your back to sitting on the side of a flat bed without using bedrails: A lot  Moving to and from bed to chair (including a wheelchair): A little  Standing up from a chair using your arms (e.g. wheelchair or bedside chair): A little  To walk in hospital room: A little  Climbing 3-5 steps with railing: A little  Basic Mobility - Total Score: 17    Education Documentation  Home Exercise Program, taught by Jami Kee, PT at 3/29/2024  2:56 PM.  Learner: Family, Patient  Readiness: Acceptance  Method: Explanation  Response: Verbalizes Understanding    Precautions, taught by Jami Kee, PT at 3/29/2024  2:56 PM.  Learner: Family, Patient  Readiness: Acceptance  Method: Explanation  Response: Verbalizes Understanding    Body Mechanics, taught by Jami Kee, PT at 3/29/2024  2:56 PM.  Learner: Family, Patient  Readiness: Acceptance  Method: Explanation  Response: Verbalizes Understanding    Mobility Training, taught by Jami Kee PT at 3/29/2024  2:56 PM.  Learner: Family,  Patient  Readiness: Acceptance  Method: Explanation  Response: Verbalizes Understanding    Handouts, taught by Jami Kee, PT at 3/29/2024  2:56 PM.  Learner: Family, Patient  Readiness: Acceptance  Method: Explanation, Demonstration, Handout  Response: Verbalizes Understanding, Demonstrated Understanding  Comment: provided supine and seated BLE exercises to perform x10-20 reps, 1-3x/day    Precautions, taught by Jami Kee PT at 3/29/2024 11:04 AM.  Learner: Patient  Readiness: Acceptance  Method: Explanation  Response: Verbalizes Understanding    Body Mechanics, taught by Jami Kee PT at 3/29/2024 11:04 AM.  Learner: Patient  Readiness: Acceptance  Method: Explanation  Response: Verbalizes Understanding    Mobility Training, taught by Jami Kee PT at 3/29/2024 11:04 AM.  Learner: Patient  Readiness: Acceptance  Method: Explanation  Response: Verbalizes Understanding    Education Comments  No comments found.           Encounter Problems       Encounter Problems (Active)       PT Problem       Patient will complete supine to sit and sit to supine Independent  (Progressing)       Start:  03/24/24    Expected End:  04/07/24            Patient will perform sit<>stand transfer with Rolling Walker, and Modified Independent  (Progressing)       Start:  03/24/24    Expected End:  04/07/24            Patient will ambulate >150' with Rolling Walker and Modified Independent  (Progressing)       Start:  03/24/24    Expected End:  04/07/24            Patient will verbalize and demonstrate abdominal precautions independently.  (Progressing)       Start:  03/24/24    Expected End:  04/07/24            Pt able to demo static standing IND with LRAD x 5 min (Progressing)       Start:  03/24/24    Expected End:  04/07/24               Pain - Adult                03/29/24 at 2:57 PM  Jami Kee, PT

## 2024-03-30 VITALS
TEMPERATURE: 98.6 F | OXYGEN SATURATION: 93 % | HEART RATE: 70 BPM | WEIGHT: 268.96 LBS | RESPIRATION RATE: 18 BRPM | BODY MASS INDEX: 36.43 KG/M2 | SYSTOLIC BLOOD PRESSURE: 128 MMHG | HEIGHT: 72 IN | DIASTOLIC BLOOD PRESSURE: 77 MMHG

## 2024-03-30 PROCEDURE — 2500000002 HC RX 250 W HCPCS SELF ADMINISTERED DRUGS (ALT 637 FOR MEDICARE OP, ALT 636 FOR OP/ED)

## 2024-03-30 PROCEDURE — 2500000001 HC RX 250 WO HCPCS SELF ADMINISTERED DRUGS (ALT 637 FOR MEDICARE OP): Performed by: NURSE PRACTITIONER

## 2024-03-30 PROCEDURE — 2500000001 HC RX 250 WO HCPCS SELF ADMINISTERED DRUGS (ALT 637 FOR MEDICARE OP)

## 2024-03-30 PROCEDURE — 2500000004 HC RX 250 GENERAL PHARMACY W/ HCPCS (ALT 636 FOR OP/ED): Performed by: NURSE PRACTITIONER

## 2024-03-30 RX ADMIN — ACETAMINOPHEN 650 MG: 325 TABLET ORAL at 02:30

## 2024-03-30 RX ADMIN — ASPIRIN 81 MG: 81 TABLET, COATED ORAL at 09:13

## 2024-03-30 RX ADMIN — LISINOPRIL 10 MG: 10 TABLET ORAL at 09:13

## 2024-03-30 RX ADMIN — PANTOPRAZOLE SODIUM 40 MG: 40 TABLET, DELAYED RELEASE ORAL at 06:00

## 2024-03-30 RX ADMIN — ESCITALOPRAM 5 MG: 5 TABLET, FILM COATED ORAL at 09:12

## 2024-03-30 RX ADMIN — ACETAMINOPHEN 650 MG: 325 TABLET ORAL at 09:12

## 2024-03-30 RX ADMIN — TAMSULOSIN HYDROCHLORIDE 0.4 MG: 0.4 CAPSULE ORAL at 09:12

## 2024-03-30 ASSESSMENT — COGNITIVE AND FUNCTIONAL STATUS - GENERAL
MOVING FROM LYING ON BACK TO SITTING ON SIDE OF FLAT BED WITH BEDRAILS: A LITTLE
DRESSING REGULAR LOWER BODY CLOTHING: A LITTLE
MOVING TO AND FROM BED TO CHAIR: A LITTLE
HELP NEEDED FOR BATHING: A LITTLE
WALKING IN HOSPITAL ROOM: A LITTLE
CLIMB 3 TO 5 STEPS WITH RAILING: A LITTLE
TOILETING: A LITTLE
PERSONAL GROOMING: A LITTLE
DRESSING REGULAR UPPER BODY CLOTHING: A LITTLE
EATING MEALS: A LITTLE
STANDING UP FROM CHAIR USING ARMS: A LITTLE
DAILY ACTIVITIY SCORE: 18
MOBILITY SCORE: 18
TURNING FROM BACK TO SIDE WHILE IN FLAT BAD: A LITTLE

## 2024-03-30 ASSESSMENT — PAIN - FUNCTIONAL ASSESSMENT: PAIN_FUNCTIONAL_ASSESSMENT: 0-10

## 2024-03-30 ASSESSMENT — PAIN SCALES - GENERAL: PAINLEVEL_OUTOF10: 0 - NO PAIN

## 2024-03-30 NOTE — PROGRESS NOTES
Jovanny Yap is a 73 y.o. male on day 8 of admission presenting with Incisional hernia of anterior abdominal wall without obstruction or gangrene.    Subjective   Naeo    Objective     Physical Exam  Vitals reviewed.   Constitutional:       General: He is not in acute distress.  HENT:      Head: Normocephalic and atraumatic.   Eyes:      Extraocular Movements: Extraocular movements intact.   Cardiovascular:      Rate and Rhythm: Normal rate and regular rhythm.   Pulmonary:      Effort: Pulmonary effort is normal. No respiratory distress.      Comments: On room air   Abdominal:      General: There is no distension.      Palpations: Abdomen is soft.      Tenderness: There is no abdominal tenderness.      Comments: Midline with staples D/I, well approximated. L NATIVIDAD drain with serosanguinous output, abdominal binder in place. Bruising on lower abdomen    Skin:     General: Skin is warm and dry.   Neurological:      Mental Status: He is oriented to person, place, and time.   Psychiatric:         Mood and Affect: Mood normal.         Behavior: Behavior normal.       Last Recorded Vitals  Blood pressure 138/85, pulse 76, temperature 36.3 °C (97.3 °F), temperature source Temporal, resp. rate 16, height 1.829 m (6'), weight 122 kg (268 lb 15.4 oz), SpO2 93 %.  Intake/Output last 3 Shifts:  I/O last 3 completed shifts:  In: 413 (3.4 mL/kg) [P.O.:413]  Out: 87.5 (0.7 mL/kg) [Drains:87.5]  Weight: 122 kg     Relevant Results  Scheduled medications  acetaminophen, 650 mg, oral, q6h  aspirin, 81 mg, oral, Daily  enoxaparin, 40 mg, subcutaneous, q24h  escitalopram, 5 mg, oral, Daily  lisinopril, 10 mg, oral, Daily  melatonin, 3 mg, oral, Nightly  nystatin, , Topical, BID  pantoprazole, 40 mg, oral, Daily before breakfast  polyethylene glycol, 17 g, oral, Daily  simvastatin, 20 mg, oral, Nightly  tamsulosin, 0.4 mg, oral, Daily      Continuous medications       PRN medications  PRN medications: ibuprofen, naloxone, [DISCONTINUED]  ondansetron ODT **OR** ondansetron, promethazine           Assessment/Plan   Principal Problem:    Incisional hernia of anterior abdominal wall without obstruction or gangrene    73 year old male with h/o HTN, HLD, BPH, GERD, anxiety, R hemicolectomy, incisional hernia, adhesive SBO s/p elective exploratory laparotomy, lysis of adhesions, open repair of incisional hernia with retrorectus mesh, myofascial release x2, TAP block, excision of abdominal scar on 3/22 with Dr. Espino. Neurology consulted on 3/24 for difficulty with word finding/dizziness with negative neuro work up, likely related to medications and post-op delirium. He continues to progress appropriately from a surgical standpoint. Pending discharge to home with HC/PT.    PLAN:   Neurology: post operative pain, well controlled, h/o anxiety, post-op delirium/insomnia-resolved   - scheduled tylenol   - ibuprofen PRN   - hold narcotics  - continue home lexapro   - melatonin at bedtime     Cardiovascular: h/o HTN, HLD   -Vital signs every 4 hours  - continue home aspirin, simvastatin, lisinopril     Pulmonology:  -IS x10 every hour   -Maintain SpO2 >92 % RA    GI: h/o GERD, R hemicolectomy, incisional hernia s/p ex lap, lysis of adhesions, open repair of incisional hernia with retrorectus mesh, myofascial release x2, TAP block, excision of abdominal scar  - regular diet + ensure supplements   - Zofran PRN nausea   - continue home PPI   - maintain abdominal binder and NATIVIDAD drain to bulb sx  - miralax daily     :  - Strict I&O   - replace electrolytes as needed    DVT Prophylaxis:  -Continue subcutaneous lovenox, SCDs, ambulation/OOB with assistance    Disposition:  - RNF  - Continue PT/OT while inpatient; Insurance denial for acute rehab. Process started for acceptance to SNF. Patient/family requesting home with PT.      Rajendra Pugh MD  General Surgery, [facility]gy3  [place] [address]    Patient will be discussed with attending. Will discharge home today if able to  arrange C.

## 2024-03-30 NOTE — DISCHARGE SUMMARY
Discharge Diagnosis  Incisional hernia of anterior abdominal wall without obstruction or gangrene    Issues Requiring Follow-Up  Post-op ventral hernia repair    Test Results Pending At Discharge  Pending Labs       Order Current Status    Surgical Pathology Exam In process            Hospital Course  73 year old male with h/o HTN, HLD, BPH, GERD, anxiety, R hemicolectomy, incisional hernia, adhesive SBO s/p elective exploratory laparotomy, lysis of adhesions, open repair of incisional hernia with retrorectus mesh, myofascial release x2, TAP block, excision of abdominal scar on 3/22 with Dr. Espino. Procedure was tolerated well and he progressed appropriately post-operatively. He was evaluated by PT/OT with recommendation for continued rehab at discharge. Insurance denied discharge to acute rehab and he was re-evaluated by PT/OT with recommendation for SNF. Recommendations and options were discussed with family and patient and it was ultimately decided preference to discharge home with family support, nursing home care/PT/OT. Upon discharge, pain was well controlled, tolerating regular diet, ambulating, passing flatus with BM, and voiding without difficulty. He was discharged home with home PT/OT/nursing with surgical follow up in 1 week.  All drains removed by time of dc.    Pertinent Physical Exam At Time of Discharge  Physical Exam  Physical Exam  Vitals reviewed.   Constitutional:       General: He is not in acute distress.  HENT:      Head: Normocephalic and atraumatic.   Eyes:      Extraocular Movements: Extraocular movements intact.   Cardiovascular:      Rate and Rhythm: Normal rate and regular rhythm.   Pulmonary:      Effort: Pulmonary effort is normal. No respiratory distress.      Comments: On room air   Abdominal:      General: There is no distension.      Palpations: Abdomen is soft.      Tenderness: There is no abdominal tenderness.      Comments: Midline with staples D/I, well approximated. L NATIVIDAD drain  was removed and site is now covered with gauze. abdominal binder in place. Bruising on lower abdomen (improving)    Skin:     General: Skin is warm and dry.   Neurological:      Mental Status: He is oriented to person, place, and time.   Psychiatric:         Mood and Affect: Mood normal.         Behavior: Behavior normal.     Home Medications     Medication List      START taking these medications     acetaminophen 325 mg tablet; Commonly known as: Tylenol; Take 2 tablets   (650 mg) by mouth every 6 hours if needed for mild pain (1 - 3) or   moderate pain (4 - 6).     CONTINUE taking these medications     aspirin 81 mg EC tablet   cyanocobalamin 1,000 mcg tablet; Commonly known as: Vitamin B-12   escitalopram 5 mg tablet; Commonly known as: Lexapro; Take 1 tablet (5   mg) by mouth once daily.   lisinopril 10 mg tablet; TAKE 1 TABLET(10 MG) BY MOUTH EVERY DAY   pantoprazole 40 mg EC tablet; Commonly known as: ProtoNix; Take 1 tablet   (40 mg) by mouth once daily.   simvastatin 20 mg tablet; Commonly known as: Zocor; Take 1 tablet (20   mg) by mouth once daily in the evening.   tamsulosin 0.4 mg 24 hr capsule; Commonly known as: Flomax; Take 1   capsule (0.4 mg) by mouth once daily.     STOP taking these medications     chlorhexidine 0.12 % solution; Commonly known as: Peridex   chlorhexidine 4 % external liquid; Commonly known as: Hibiclens       Outpatient Follow-Up  Future Appointments   Date Time Provider Department Center   4/5/2024 11:00 AM JOURDAN Carrasco-CNP [id number] [facility]   4/9/2024  1:00 PM Jhonny Cuevas DO [id number] [facility]   4/29/2024  1:00 PM ELY [id number] [facility]    5/2/2024  2:30 PM Rosendo Ziegler MD [id number] [facility]       Rajendra Pugh MD

## 2024-03-30 NOTE — CARE PLAN
The patient's goals for the shift include hemodynamic stability    The clinical goals for the shift include patient will have adequate nausea control    Over the shift, the patient did not make progress toward the following goals. Barriers to progression include forgetfulness Recommendations to address these barriers include re-educate.

## 2024-03-31 ENCOUNTER — HOME CARE VISIT (OUTPATIENT)
Dept: HOME HEALTH SERVICES | Facility: HOME HEALTH | Age: 74
End: 2024-03-31
Payer: MEDICARE

## 2024-03-31 VITALS
WEIGHT: 240 LBS | BODY MASS INDEX: 32.51 KG/M2 | OXYGEN SATURATION: 96 % | HEIGHT: 72 IN | HEART RATE: 72 BPM | DIASTOLIC BLOOD PRESSURE: 74 MMHG | TEMPERATURE: 97.3 F | SYSTOLIC BLOOD PRESSURE: 116 MMHG

## 2024-03-31 PROCEDURE — G0299 HHS/HOSPICE OF RN EA 15 MIN: HCPCS | Mod: HHH

## 2024-03-31 PROCEDURE — 169592 NO-PAY CLAIM PROCEDURE

## 2024-03-31 PROCEDURE — 1090000002 HH PPS REVENUE DEBIT

## 2024-03-31 PROCEDURE — 0023 HH SOC

## 2024-03-31 PROCEDURE — 1090000001 HH PPS REVENUE CREDIT

## 2024-03-31 ASSESSMENT — ENCOUNTER SYMPTOMS
LOSS OF SENSATION IN FEET: 0
PAIN SEVERITY GOAL: 0/10
OCCASIONAL FEELINGS OF UNSTEADINESS: 1
LAST BOWEL MOVEMENT: 66930
PAIN LOCATION - PAIN QUALITY: ACHE
DIARRHEA: 1
SUBJECTIVE PAIN PROGRESSION: UNCHANGED
HIGHEST PAIN SEVERITY IN PAST 24 HOURS: 2/10
DEPRESSION: 0
STOOL FREQUENCY: DAILY
ABDOMINAL PAIN: 1
PAIN LOCATION - PAIN DURATION: INTERMITTENT
PAIN LOCATION - RELIEVING FACTORS: REST/RX
APPETITE LEVEL: FAIR
PAIN LOCATION - PAIN FREQUENCY: INTERMITTENT
PAIN LOCATION - EXACERBATING FACTORS: ACTIVITY
LOWEST PAIN SEVERITY IN PAST 24 HOURS: 0/10
HYPERTENSION: 1
PAIN LOCATION - PAIN SEVERITY: 2/10
PERSON REPORTING PAIN: PATIENT
PAIN: 1
PAIN LOCATION: ABDOMEN

## 2024-03-31 ASSESSMENT — PAIN SCALES - PAIN ASSESSMENT IN ADVANCED DEMENTIA (PAINAD)
TOTALSCORE: 0
FACIALEXPRESSION: 0
CONSOLABILITY: 0
FACIALEXPRESSION: 0 - SMILING OR INEXPRESSIVE.
BREATHING: 0
BODYLANGUAGE: 0
NEGVOCALIZATION: 0
BODYLANGUAGE: 0 - RELAXED.
NEGVOCALIZATION: 0 - NONE.
CONSOLABILITY: 0 - NO NEED TO CONSOLE.

## 2024-03-31 ASSESSMENT — ACTIVITIES OF DAILY LIVING (ADL)
AMBULATION ASSISTANCE: 1
PHYSICAL_TRANSFERS_DEVICES: WALKER
PHYSICAL TRANSFERS ASSESSED: 1
ENTERING_EXITING_HOME: NEEDS ASSISTANCE
AMBULATION ASSISTANCE: STAND BY ASSIST
OASIS_M1830: 02
CURRENT_FUNCTION: STAND BY ASSIST

## 2024-04-01 LAB
LABORATORY COMMENT REPORT: NORMAL
PATH REPORT.FINAL DX SPEC: NORMAL
PATH REPORT.GROSS SPEC: NORMAL
PATH REPORT.RELEVANT HX SPEC: NORMAL
PATH REPORT.TOTAL CANCER: NORMAL

## 2024-04-01 PROCEDURE — 1090000002 HH PPS REVENUE DEBIT

## 2024-04-01 PROCEDURE — 1090000001 HH PPS REVENUE CREDIT

## 2024-04-02 ENCOUNTER — HOME CARE VISIT (OUTPATIENT)
Dept: HOME HEALTH SERVICES | Facility: HOME HEALTH | Age: 74
End: 2024-04-02
Payer: MEDICARE

## 2024-04-02 VITALS — DIASTOLIC BLOOD PRESSURE: 84 MMHG | SYSTOLIC BLOOD PRESSURE: 134 MMHG | HEART RATE: 74 BPM | OXYGEN SATURATION: 98 %

## 2024-04-02 PROCEDURE — 1090000001 HH PPS REVENUE CREDIT

## 2024-04-02 PROCEDURE — 1090000002 HH PPS REVENUE DEBIT

## 2024-04-02 PROCEDURE — G0151 HHCP-SERV OF PT,EA 15 MIN: HCPCS | Mod: HHH

## 2024-04-02 ASSESSMENT — ACTIVITIES OF DAILY LIVING (ADL): AMBULATION ASSISTANCE ON FLAT SURFACES: 1

## 2024-04-02 ASSESSMENT — ENCOUNTER SYMPTOMS
SUBJECTIVE PAIN PROGRESSION: GRADUALLY IMPROVING
PAIN: 1
PAIN LOCATION - PAIN SEVERITY: 2/10
PAIN SEVERITY GOAL: 0/10
PERSON REPORTING PAIN: PATIENT

## 2024-04-03 PROCEDURE — 1090000001 HH PPS REVENUE CREDIT

## 2024-04-03 PROCEDURE — 1090000002 HH PPS REVENUE DEBIT

## 2024-04-03 PROCEDURE — G0180 MD CERTIFICATION HHA PATIENT: HCPCS | Performed by: INTERNAL MEDICINE

## 2024-04-04 ENCOUNTER — APPOINTMENT (OUTPATIENT)
Dept: SURGERY | Facility: CLINIC | Age: 74
End: 2024-04-04
Payer: MEDICARE

## 2024-04-04 ENCOUNTER — HOME CARE VISIT (OUTPATIENT)
Dept: HOME HEALTH SERVICES | Facility: HOME HEALTH | Age: 74
End: 2024-04-04
Payer: MEDICARE

## 2024-04-04 PROCEDURE — 1090000002 HH PPS REVENUE DEBIT

## 2024-04-04 PROCEDURE — G0152 HHCP-SERV OF OT,EA 15 MIN: HCPCS | Mod: HHH

## 2024-04-04 PROCEDURE — 1090000001 HH PPS REVENUE CREDIT

## 2024-04-04 SDOH — ECONOMIC STABILITY: HOUSING INSECURITY: HOME SAFETY: RANCH HOME WITH SPOUSE, BATHROOM HAS WALK IN SHOWER WITH GRAB BARS

## 2024-04-04 ASSESSMENT — ACTIVITIES OF DAILY LIVING (ADL)
TOILETING: 1
DRESSING_UB_CURRENT_FUNCTION: INDEPENDENT
TOILETING: INDEPENDENT
DRESSING_LB_CURRENT_FUNCTION: INDEPENDENT

## 2024-04-04 ASSESSMENT — ENCOUNTER SYMPTOMS
DENIES PAIN: 1
PERSON REPORTING PAIN: PATIENT

## 2024-04-05 ENCOUNTER — OFFICE VISIT (OUTPATIENT)
Dept: SURGERY | Facility: CLINIC | Age: 74
End: 2024-04-05
Payer: MEDICARE

## 2024-04-05 VITALS
WEIGHT: 255 LBS | RESPIRATION RATE: 16 BRPM | SYSTOLIC BLOOD PRESSURE: 142 MMHG | BODY MASS INDEX: 34.54 KG/M2 | HEIGHT: 72 IN | DIASTOLIC BLOOD PRESSURE: 88 MMHG | HEART RATE: 74 BPM

## 2024-04-05 DIAGNOSIS — Z48.02 ENCOUNTER FOR STAPLE REMOVAL: ICD-10-CM

## 2024-04-05 DIAGNOSIS — K43.2 INCISIONAL HERNIA, WITHOUT OBSTRUCTION OR GANGRENE: Primary | ICD-10-CM

## 2024-04-05 PROCEDURE — 1159F MED LIST DOCD IN RCRD: CPT | Performed by: NURSE PRACTITIONER

## 2024-04-05 PROCEDURE — 3079F DIAST BP 80-89 MM HG: CPT | Performed by: NURSE PRACTITIONER

## 2024-04-05 PROCEDURE — 1036F TOBACCO NON-USER: CPT | Performed by: NURSE PRACTITIONER

## 2024-04-05 PROCEDURE — 3008F BODY MASS INDEX DOCD: CPT | Performed by: NURSE PRACTITIONER

## 2024-04-05 PROCEDURE — 1090000001 HH PPS REVENUE CREDIT

## 2024-04-05 PROCEDURE — 3077F SYST BP >= 140 MM HG: CPT | Performed by: NURSE PRACTITIONER

## 2024-04-05 PROCEDURE — 1125F AMNT PAIN NOTED PAIN PRSNT: CPT | Performed by: NURSE PRACTITIONER

## 2024-04-05 PROCEDURE — 1111F DSCHRG MED/CURRENT MED MERGE: CPT | Performed by: NURSE PRACTITIONER

## 2024-04-05 PROCEDURE — 99024 POSTOP FOLLOW-UP VISIT: CPT | Performed by: NURSE PRACTITIONER

## 2024-04-05 PROCEDURE — 15853 REMOVAL SUTR/STAPL XREQ ANES: CPT | Performed by: NURSE PRACTITIONER

## 2024-04-05 PROCEDURE — 1090000002 HH PPS REVENUE DEBIT

## 2024-04-05 ASSESSMENT — PAIN SCALES - GENERAL: PAINLEVEL: 2

## 2024-04-05 NOTE — PROGRESS NOTES
History Of Present Illness  Jovanny Yap is a 73 y.o. male presenting today for post-operative follow up after elective exploratory laparotomy, lysis of adhesions, open repair of incisional hernia with retrorectus mesh, myofascial release x2, TAP block, excision of abdominal scar on 3/22 with Dr. Espino.   He is accompanied by his wife and reports that he has been doing well since being home after surgery. He is still being seen by home PT and ambulating with walker well. Home OT services were discontinued due to progressing well with his ADLs. Denies issues with constipation, diarrhea, n/v. He has a good appetite and eating/drinking well. Denies incisional pain; he is taking tylenol as needed.      Past Medical History  He has a past medical history of MAT (acute kidney injury) (CMS/[facility]), Anxiety, BPH (benign prostatic hyperplasia), Colon polyp, DVT (deep venous thrombosis) (CMS/[facility]) (08/03/2023), Fatty liver, GERD (gastroesophageal reflux disease), HL (hearing loss), Hyperlipidemia, Hypertension, Incisional hernia, Renal cyst, Renal lesion (04/2023), Small bowel obstruction (CMS/[facility]) (08/21/2023), and Vision loss.    Surgical History  He has a past surgical history that includes Colonoscopy (01/10/2024); Eye surgery; Hemicolectomy (Right); Renal biopsy (04/2023); and Tonsillectomy.     Social History  He reports that he has never smoked. He has never used smokeless tobacco. He reports that he does not currently use alcohol. He reports that he does not use drugs.    Family History  Family History   Problem Relation Name Age of Onset    Stroke Mother      Coronary artery disease Father          Allergies  Patient has no known allergies.    Review of Systems    Constitutional: Denies fever, chills   HEENT: Denies changes in vision or hearing   Respiratory: Denies shortness of breath  Cardiovascular: Denies chest pain, palpitations   GI: Denies constipation, diarrhea, n/v  : Denies urinary retention   MSK: Denies  myalgia   Neurological: Denies headache or syncope   Skin: Denies rashes  Psychiatric: Denies recent changes in mood, anxiety, or depression        Physical Exam  Constitutional:       General: He is not in acute distress.  HENT:      Head: Normocephalic and atraumatic.   Eyes:      Extraocular Movements: Extraocular movements intact.   Cardiovascular:      Rate and Rhythm: Normal rate and regular rhythm.   Pulmonary:      Effort: Pulmonary effort is normal. No respiratory distress.   Abdominal:      Palpations: Abdomen is soft.      Tenderness: There is no abdominal tenderness.      Comments: Healing midline incision with staples, well approximated. Mild bruising lower abdomen which has improved.    Skin:     General: Skin is warm and dry.   Neurological:      Mental Status: He is alert and oriented to person, place, and time.   Psychiatric:         Mood and Affect: Mood normal.         Behavior: Behavior normal.       Last Recorded Vitals  Blood pressure 142/88, pulse 74, resp. rate 16, height 1.829 m (6'), weight 116 kg (255 lb).       Assessment/Plan     73 year old male presenting today for post-operative follow up after elective exploratory laparotomy, lysis of adhesions, open repair of incisional hernia with retrorectus mesh, myofascial release x2, TAP block, excision of abdominal scar on 3/22 with Dr. Espino. He is recovering well post-operatively and continues to work with outpatient PT. He has no post-operative concerns. Staples were removed on healing midline incision without difficulty. He was instructed to continue PT per physical therapist discretion and to continue with no heavy lifting >10-15 lbs for the next 2 weeks. Follow up at this time as needed.       Mari Brower, APRN-CNP

## 2024-04-06 PROCEDURE — 1090000002 HH PPS REVENUE DEBIT

## 2024-04-06 PROCEDURE — 1090000001 HH PPS REVENUE CREDIT

## 2024-04-07 PROCEDURE — 1090000001 HH PPS REVENUE CREDIT

## 2024-04-07 PROCEDURE — 1090000002 HH PPS REVENUE DEBIT

## 2024-04-08 PROCEDURE — 1090000002 HH PPS REVENUE DEBIT

## 2024-04-08 PROCEDURE — 1090000001 HH PPS REVENUE CREDIT

## 2024-04-09 ENCOUNTER — OFFICE VISIT (OUTPATIENT)
Dept: PRIMARY CARE | Facility: CLINIC | Age: 74
End: 2024-04-09
Payer: MEDICARE

## 2024-04-09 VITALS
BODY MASS INDEX: 35 KG/M2 | HEIGHT: 71 IN | SYSTOLIC BLOOD PRESSURE: 128 MMHG | RESPIRATION RATE: 16 BRPM | OXYGEN SATURATION: 98 % | HEART RATE: 82 BPM | TEMPERATURE: 97.1 F | DIASTOLIC BLOOD PRESSURE: 70 MMHG | WEIGHT: 250 LBS

## 2024-04-09 DIAGNOSIS — Z12.5 PROSTATE CANCER SCREENING: ICD-10-CM

## 2024-04-09 DIAGNOSIS — R35.0 URINARY FREQUENCY: ICD-10-CM

## 2024-04-09 DIAGNOSIS — E78.2 MIXED HYPERLIPIDEMIA: Primary | Chronic | ICD-10-CM

## 2024-04-09 PROBLEM — I82.409 DVT (DEEP VENOUS THROMBOSIS) (MULTI): Status: RESOLVED | Noted: 2023-08-30 | Resolved: 2024-04-09

## 2024-04-09 PROBLEM — I82.412 ACUTE DEEP VEIN THROMBOSIS (DVT) OF FEMORAL VEIN OF LEFT LOWER EXTREMITY (MULTI): Chronic | Status: RESOLVED | Noted: 2023-08-30 | Resolved: 2024-04-09

## 2024-04-09 PROCEDURE — 1036F TOBACCO NON-USER: CPT | Performed by: INTERNAL MEDICINE

## 2024-04-09 PROCEDURE — 99397 PER PM REEVAL EST PAT 65+ YR: CPT | Performed by: INTERNAL MEDICINE

## 2024-04-09 PROCEDURE — G0439 PPPS, SUBSEQ VISIT: HCPCS | Performed by: INTERNAL MEDICINE

## 2024-04-09 PROCEDURE — 3074F SYST BP LT 130 MM HG: CPT | Performed by: INTERNAL MEDICINE

## 2024-04-09 PROCEDURE — 1160F RVW MEDS BY RX/DR IN RCRD: CPT | Performed by: INTERNAL MEDICINE

## 2024-04-09 PROCEDURE — 1170F FXNL STATUS ASSESSED: CPT | Performed by: INTERNAL MEDICINE

## 2024-04-09 PROCEDURE — 1111F DSCHRG MED/CURRENT MED MERGE: CPT | Performed by: INTERNAL MEDICINE

## 2024-04-09 PROCEDURE — 1090000001 HH PPS REVENUE CREDIT

## 2024-04-09 PROCEDURE — 1159F MED LIST DOCD IN RCRD: CPT | Performed by: INTERNAL MEDICINE

## 2024-04-09 PROCEDURE — 99214 OFFICE O/P EST MOD 30 MIN: CPT | Performed by: INTERNAL MEDICINE

## 2024-04-09 PROCEDURE — 3078F DIAST BP <80 MM HG: CPT | Performed by: INTERNAL MEDICINE

## 2024-04-09 PROCEDURE — 1090000002 HH PPS REVENUE DEBIT

## 2024-04-09 PROCEDURE — 3008F BODY MASS INDEX DOCD: CPT | Performed by: INTERNAL MEDICINE

## 2024-04-09 ASSESSMENT — ACTIVITIES OF DAILY LIVING (ADL)
DOING_HOUSEWORK: INDEPENDENT
GROCERY_SHOPPING: INDEPENDENT
TAKING_MEDICATION: INDEPENDENT
MANAGING_FINANCES: INDEPENDENT
DRESSING: INDEPENDENT
BATHING: INDEPENDENT

## 2024-04-09 ASSESSMENT — ENCOUNTER SYMPTOMS: ABDOMINAL PAIN: 0

## 2024-04-09 NOTE — PROGRESS NOTES
Patient here for a medicare wellness visit / follow up and physical - declined chaperone     Subjective   Patient ID: Jovanny Yap is a 74 y.o. male who presents for Follow-up, Annual Exam, and Medicare Annual Wellness Visit Subsequent.  He is accompanied by his wife who offers some of the history.       The patient recently underwent exploratory Laparotomy, lysis of adhesions, open repair of incisional hernia with mesh, Myofacial release x2, TAP Block, and excision of abdominal scar on 3/22/2024 with  from General Surgery.  He was eventually discharged from the hospital on 3/30/2024 in relatively stable condition.    The patient reports that he feels significantly better since leaving the hospital.  He denies any abdominal pain.  The patient is receiving Nursing Visits at home for wound care.  He is also following with outpatient physical therapy and occupational therapy as well.  The patient recently followed up with General Surgery on 4/5/2024 where he was confirmed to be recovering well, and the surgical staples were successfully removed.    The patient recalls being seen by Neurology during his hospitalization for bilateral upper extremity shaking that was thought to be related to the gabapentin and opioids per the note.  He recalls experiencing hallucinations at the time as well, and denies any further episodes since that time.     The patient reports recent nocturia of three or four times per evening, and inquires about medication management.  He is currently taking tamsulosin 0.4mg once daily.    The patient suspects he may excessive cerumen, and inquires about recommendations for Audiometry.      Review of Systems   HENT:          Positive for excess cerumen.   Gastrointestinal:  Negative for abdominal pain.   Genitourinary:         Positive for nocturia.       Objective   Physical Exam  Constitutional:       Appearance: Normal appearance.   HENT:      Right Ear: Tympanic membrane normal. There is  no impacted cerumen.      Left Ear: Tympanic membrane normal. There is no impacted cerumen.   Neck:      Vascular: No carotid bruit.   Cardiovascular:      Rate and Rhythm: Normal rate and regular rhythm.      Heart sounds: Normal heart sounds.   Pulmonary:      Effort: Pulmonary effort is normal.      Breath sounds: Normal breath sounds.   Abdominal:      General: Bowel sounds are normal.      Palpations: Abdomen is soft.      Tenderness: There is no abdominal tenderness.   Skin:     General: Skin is warm and dry.   Neurological:      General: No focal deficit present.      Mental Status: He is alert and oriented to person, place, and time. Mental status is at baseline.   Psychiatric:         Mood and Affect: Mood normal.         Behavior: Behavior normal.       Assessment/Plan   Problem List Items Addressed This Visit             ICD-10-CM    Hyperlipidemia - Primary (Chronic) E78.5    Relevant Orders    Lipid panel     Other Visit Diagnoses         Codes    Prostate cancer screening     Z12.5    Relevant Orders    PSA    Urinary frequency     R35.0    Relevant Orders    Urinalysis with Reflex Microscopic            Medicare Wellness Examination Done  -  Discussed healthy diet and regular exercise.    -  Physical exam overall unremarkable. Immunizations reviewed and updated accordingly. Healthy lifestyle choices discussed (tobacco avoidance, appropriate alcohol use, avoidance of illicit substances).   -  Patient is wearing seatbelt.   -  Screening lab work ordered as indicated.    -  Age appropriate screening tests reviewed with patient.       IMPRESSION/PLAN:      Excess Cerumen  - Visible cerumen on external canal, and recommended patient wipe with warm wash cloth.  TM normal bilaterally.    Nocturia  - Ordered Urinalysis with reflex microscopy, and PSA.    HTN  - /70 today in office.  Last Stress Test 7/2023 unremarkable.  Following with  from Cardiology.     HLD   - Stable, continue on  simvastatin 20mg QD.  CT Cardiac Score 8.77 per 11/2022.     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  Incidental CT Abdomen finding of 3.4 x 2.7 cm ill-defined hypodense area in the interpolar region of the left kidney. MRI Abdomen 3/2023 showed no significant interval change known oncocytic lesion left kidney, and newly seen left perinephric abnormality compatible with small hematoma with surrounding enhancing tissue likely granulation tissue. Following  from Urology.       Ventral Abdominal Hernia  - s/p open repair with mesh 3/22/2024 with  from General Surgery.     Small Bowel Obstruction   - s/p exploratory Laparotomy, lysis of adhesions, open repair of incisional hernia with mesh, Myofacial release x2, TAP Block, and excision of abdominal scar on 3/22/2024 with  from General Surgery.  Second SBO episode 8/21/2023 treated conservatively.  First SBO 12/2022 treated conservatively.      Chronic Fatigue Syndrome/Depression   - Symptoms stable on escitalopram 5mg QD. Continue with Vitamin B12 1000 mcg QD per Psychiatry. Discontinued bupropion ER 150mg QD after small bowel obstruction in 12/2022.     BPH   - Last PSA in normal range - 9/2022, maintained on tamsulosin 0.4mg QD.     Health Maintenance   - Routine labs ordered including lipid panel to be completed in the fasting state.  Added PSA. Last PSA wnl 9/2022.  Last colonoscopy performed 1/2024, repeat due 1/2027.      Follow up in 6 months, call sooner if needed.       Scribe Attestation  By signing my name below, I, Herberth Henson   attest that this documentation has been prepared under the direction and in the presence of Jhonny Cuevas DO.   Iris Roque 04/09/24 1:12 PM

## 2024-04-10 ENCOUNTER — HOME CARE VISIT (OUTPATIENT)
Dept: HOME HEALTH SERVICES | Facility: HOME HEALTH | Age: 74
End: 2024-04-10
Payer: MEDICARE

## 2024-04-10 VITALS
TEMPERATURE: 98 F | DIASTOLIC BLOOD PRESSURE: 68 MMHG | OXYGEN SATURATION: 97 % | RESPIRATION RATE: 20 BRPM | SYSTOLIC BLOOD PRESSURE: 152 MMHG | HEART RATE: 72 BPM

## 2024-04-10 VITALS
SYSTOLIC BLOOD PRESSURE: 135 MMHG | RESPIRATION RATE: 14 BRPM | OXYGEN SATURATION: 97 % | HEART RATE: 69 BPM | DIASTOLIC BLOOD PRESSURE: 78 MMHG

## 2024-04-10 PROCEDURE — 1090000002 HH PPS REVENUE DEBIT

## 2024-04-10 PROCEDURE — G0299 HHS/HOSPICE OF RN EA 15 MIN: HCPCS | Mod: HHH

## 2024-04-10 PROCEDURE — G0151 HHCP-SERV OF PT,EA 15 MIN: HCPCS | Mod: HHH

## 2024-04-10 PROCEDURE — 1090000001 HH PPS REVENUE CREDIT

## 2024-04-10 SDOH — ECONOMIC STABILITY: GENERAL

## 2024-04-10 ASSESSMENT — ACTIVITIES OF DAILY LIVING (ADL): MONEY MANAGEMENT (EXPENSES/BILLS): INDEPENDENT

## 2024-04-10 ASSESSMENT — ENCOUNTER SYMPTOMS
LOSS OF SENSATION IN FEET: 0
PAIN LOCATION - EXACERBATING FACTORS: SURGERY
STOOL FREQUENCY: DAILY
CHANGE IN APPETITE: UNCHANGED
PAIN LOCATION: ABDOMEN
LOWEST PAIN SEVERITY IN PAST 24 HOURS: 0/10
PAIN LOCATION - PAIN FREQUENCY: INFREQUENT
PAIN SEVERITY GOAL: 0/10
DEPRESSION: 0
BOWEL PATTERN NORMAL: 1
PAIN: 1
PAIN LOCATION - PAIN QUALITY: SORENESS
PAIN LOCATION - RELIEVING FACTORS: REST,MEDS
PAIN LOCATION - PAIN QUALITY: TENDER
PAIN LOCATION: ABDOMEN
PERSON REPORTING PAIN: CAREGIVER
LAST BOWEL MOVEMENT: 66940
SUBJECTIVE PAIN PROGRESSION: GRADUALLY IMPROVING
OCCASIONAL FEELINGS OF UNSTEADINESS: 0
APPETITE LEVEL: GOOD
HYPERTENSION: 1
HIGHEST PAIN SEVERITY IN PAST 24 HOURS: 2/10
PERSON REPORTING PAIN: PATIENT
LOWEST PAIN SEVERITY IN PAST 24 HOURS: 1/10
SUBJECTIVE PAIN PROGRESSION: RAPIDLY IMPROVING
PAIN: 1
PAIN LOCATION - PAIN SEVERITY: 3/10
PAIN SEVERITY GOAL: 0/10
PAIN LOCATION - PAIN SEVERITY: 1/10
HIGHEST PAIN SEVERITY IN PAST 24 HOURS: 4/10

## 2024-04-10 ASSESSMENT — PAIN SCALES - PAIN ASSESSMENT IN ADVANCED DEMENTIA (PAINAD)
FACIALEXPRESSION: 0
FACIALEXPRESSION: 0 - SMILING OR INEXPRESSIVE.
NEGVOCALIZATION: 0
CONSOLABILITY: 0
CONSOLABILITY: 0 - NO NEED TO CONSOLE.
TOTALSCORE: 0
BODYLANGUAGE: 0 - RELAXED.
BODYLANGUAGE: 0
BREATHING: 0
NEGVOCALIZATION: 0 - NONE.

## 2024-04-11 PROCEDURE — 1090000002 HH PPS REVENUE DEBIT

## 2024-04-11 PROCEDURE — 1090000001 HH PPS REVENUE CREDIT

## 2024-04-12 ENCOUNTER — TELEPHONE (OUTPATIENT)
Dept: PRIMARY CARE | Facility: CLINIC | Age: 74
End: 2024-04-12
Payer: MEDICARE

## 2024-04-12 PROCEDURE — 1090000002 HH PPS REVENUE DEBIT

## 2024-04-12 PROCEDURE — 1090000001 HH PPS REVENUE CREDIT

## 2024-04-12 NOTE — TELEPHONE ENCOUNTER
Would try to add on Colace 100 mg twice daily as well (in addition to the Miralax).  If this doesn't work can try Milk of Magnesia over the weekend.

## 2024-04-12 NOTE — TELEPHONE ENCOUNTER
Pt has been constipated and gassy for 2 days - has tried taking Miralax, but that does not seem to be working - pt would like any other recommendations to help - please call and advise

## 2024-04-13 PROCEDURE — 1090000002 HH PPS REVENUE DEBIT

## 2024-04-13 PROCEDURE — 1090000001 HH PPS REVENUE CREDIT

## 2024-04-14 PROCEDURE — 1090000002 HH PPS REVENUE DEBIT

## 2024-04-14 PROCEDURE — 1090000001 HH PPS REVENUE CREDIT

## 2024-04-15 ENCOUNTER — LAB (OUTPATIENT)
Dept: LAB | Facility: LAB | Age: 74
End: 2024-04-15
Payer: MEDICARE

## 2024-04-15 DIAGNOSIS — E78.2 MIXED HYPERLIPIDEMIA: ICD-10-CM

## 2024-04-15 DIAGNOSIS — R35.0 URINARY FREQUENCY: ICD-10-CM

## 2024-04-15 DIAGNOSIS — Z12.5 PROSTATE CANCER SCREENING: ICD-10-CM

## 2024-04-15 LAB
APPEARANCE UR: CLEAR
BILIRUB UR STRIP.AUTO-MCNC: NEGATIVE MG/DL
CHOLEST SERPL-MCNC: 125 MG/DL (ref 0–199)
CHOLESTEROL/HDL RATIO: 3.2
COLOR UR: YELLOW
GLUCOSE UR STRIP.AUTO-MCNC: NORMAL MG/DL
HDLC SERPL-MCNC: 39.5 MG/DL
KETONES UR STRIP.AUTO-MCNC: NEGATIVE MG/DL
LDLC SERPL CALC-MCNC: 60 MG/DL
LEUKOCYTE ESTERASE UR QL STRIP.AUTO: NEGATIVE
MUCOUS THREADS #/AREA URNS AUTO: ABNORMAL /LPF
NITRITE UR QL STRIP.AUTO: NEGATIVE
NON HDL CHOLESTEROL: 86 MG/DL (ref 0–149)
PH UR STRIP.AUTO: 5.5 [PH]
PROT UR STRIP.AUTO-MCNC: NORMAL MG/DL
PSA SERPL-MCNC: 1.15 NG/ML
RBC # UR STRIP.AUTO: NEGATIVE /UL
RBC #/AREA URNS AUTO: ABNORMAL /HPF
SP GR UR STRIP.AUTO: 1.03
SQUAMOUS #/AREA URNS AUTO: ABNORMAL /HPF
TRIGL SERPL-MCNC: 127 MG/DL (ref 0–149)
UROBILINOGEN UR STRIP.AUTO-MCNC: NORMAL MG/DL
VLDL: 25 MG/DL (ref 0–40)
WBC #/AREA URNS AUTO: ABNORMAL /HPF

## 2024-04-15 PROCEDURE — G0103 PSA SCREENING: HCPCS

## 2024-04-15 PROCEDURE — 81001 URINALYSIS AUTO W/SCOPE: CPT

## 2024-04-15 PROCEDURE — 80061 LIPID PANEL: CPT

## 2024-04-15 PROCEDURE — 1090000002 HH PPS REVENUE DEBIT

## 2024-04-15 PROCEDURE — 1090000001 HH PPS REVENUE CREDIT

## 2024-04-15 PROCEDURE — 36415 COLL VENOUS BLD VENIPUNCTURE: CPT

## 2024-04-16 PROCEDURE — 1090000002 HH PPS REVENUE DEBIT

## 2024-04-16 PROCEDURE — 1090000001 HH PPS REVENUE CREDIT

## 2024-04-17 ENCOUNTER — HOME CARE VISIT (OUTPATIENT)
Dept: HOME HEALTH SERVICES | Facility: HOME HEALTH | Age: 74
End: 2024-04-17
Payer: MEDICARE

## 2024-04-17 VITALS
SYSTOLIC BLOOD PRESSURE: 128 MMHG | TEMPERATURE: 97.9 F | OXYGEN SATURATION: 97 % | DIASTOLIC BLOOD PRESSURE: 72 MMHG | HEART RATE: 71 BPM

## 2024-04-17 PROCEDURE — 1090000002 HH PPS REVENUE DEBIT

## 2024-04-17 PROCEDURE — 1090000001 HH PPS REVENUE CREDIT

## 2024-04-17 PROCEDURE — G0299 HHS/HOSPICE OF RN EA 15 MIN: HCPCS | Mod: HHH

## 2024-04-17 ASSESSMENT — ENCOUNTER SYMPTOMS
PAIN LOCATION - PAIN SEVERITY: 2/10
CHANGE IN APPETITE: UNCHANGED
PAIN LOCATION: ABDOMEN
APPETITE LEVEL: GOOD
PAIN: 1
PERSON REPORTING PAIN: PATIENT

## 2024-04-17 ASSESSMENT — ACTIVITIES OF DAILY LIVING (ADL)
HOME_HEALTH_OASIS: 00
OASIS_M1830: 00

## 2024-04-22 ENCOUNTER — PATIENT OUTREACH (OUTPATIENT)
Dept: PRIMARY CARE | Facility: CLINIC | Age: 74
End: 2024-04-22

## 2024-04-22 NOTE — PROGRESS NOTES
Patient has met target of no readmission for (90) days post hospital discharge and is graduated from Transitional Care Management program at this time.  Kwadwo is doing very well. He had hernia repair and getting back to normal.

## 2024-04-24 DIAGNOSIS — K21.9 GASTROESOPHAGEAL REFLUX DISEASE WITHOUT ESOPHAGITIS: ICD-10-CM

## 2024-04-24 RX ORDER — PANTOPRAZOLE SODIUM 40 MG/1
40 TABLET, DELAYED RELEASE ORAL DAILY
Qty: 90 TABLET | Refills: 2 | Status: SHIPPED | OUTPATIENT
Start: 2024-04-24

## 2024-04-29 ENCOUNTER — HOSPITAL ENCOUNTER (OUTPATIENT)
Dept: RADIOLOGY | Facility: CLINIC | Age: 74
Discharge: HOME | End: 2024-04-29
Payer: MEDICARE

## 2024-04-29 DIAGNOSIS — D30.00 RENAL ONCOCYTOMA, UNSPECIFIED LATERALITY: ICD-10-CM

## 2024-04-29 PROCEDURE — 74183 MRI ABD W/O CNTR FLWD CNTR: CPT

## 2024-04-29 PROCEDURE — A9575 INJ GADOTERATE MEGLUMI 0.1ML: HCPCS | Performed by: UROLOGY

## 2024-04-29 PROCEDURE — 2550000001 HC RX 255 CONTRASTS: Performed by: UROLOGY

## 2024-04-29 PROCEDURE — 74183 MRI ABD W/O CNTR FLWD CNTR: CPT | Performed by: RADIOLOGY

## 2024-04-29 RX ORDER — GADOTERATE MEGLUMINE 376.9 MG/ML
22 INJECTION INTRAVENOUS
Status: COMPLETED | OUTPATIENT
Start: 2024-04-29 | End: 2024-04-29

## 2024-04-29 RX ADMIN — GADOTERATE MEGLUMINE 22 ML: 376.9 INJECTION INTRAVENOUS at 13:06

## 2024-05-02 ENCOUNTER — APPOINTMENT (OUTPATIENT)
Dept: RADIOLOGY | Facility: CLINIC | Age: 74
End: 2024-05-02
Payer: MEDICARE

## 2024-05-02 ENCOUNTER — OFFICE VISIT (OUTPATIENT)
Dept: UROLOGY | Facility: CLINIC | Age: 74
End: 2024-05-02
Payer: MEDICARE

## 2024-05-02 VITALS
SYSTOLIC BLOOD PRESSURE: 105 MMHG | WEIGHT: 245.59 LBS | RESPIRATION RATE: 16 BRPM | DIASTOLIC BLOOD PRESSURE: 68 MMHG | HEIGHT: 71 IN | HEART RATE: 63 BPM | BODY MASS INDEX: 34.38 KG/M2

## 2024-05-02 DIAGNOSIS — D30.00 RENAL ONCOCYTOMA, UNSPECIFIED LATERALITY: ICD-10-CM

## 2024-05-02 DIAGNOSIS — N28.1 RENAL CYST: ICD-10-CM

## 2024-05-02 PROCEDURE — 3008F BODY MASS INDEX DOCD: CPT | Performed by: UROLOGY

## 2024-05-02 PROCEDURE — 99213 OFFICE O/P EST LOW 20 MIN: CPT | Performed by: UROLOGY

## 2024-05-02 PROCEDURE — 3074F SYST BP LT 130 MM HG: CPT | Performed by: UROLOGY

## 2024-05-02 PROCEDURE — 1036F TOBACCO NON-USER: CPT | Performed by: UROLOGY

## 2024-05-02 PROCEDURE — 1160F RVW MEDS BY RX/DR IN RCRD: CPT | Performed by: UROLOGY

## 2024-05-02 PROCEDURE — 1126F AMNT PAIN NOTED NONE PRSNT: CPT | Performed by: UROLOGY

## 2024-05-02 PROCEDURE — 3078F DIAST BP <80 MM HG: CPT | Performed by: UROLOGY

## 2024-05-02 PROCEDURE — 1159F MED LIST DOCD IN RCRD: CPT | Performed by: UROLOGY

## 2024-05-02 ASSESSMENT — PAIN SCALES - GENERAL: PAINLEVEL: 0-NO PAIN

## 2024-05-02 NOTE — PROGRESS NOTES
PRIOR NOTES  74-year-old male referred to me for possible renal lesion  PMHx: Anxiety, BMI 34, GERD, hydrocele, constipation post-op, DVT, pSBO  PSHx: Prior midline pincision for partial colectomy  On Xarelto  creatinine 1.01, hemoglobin 14.5 UA without RBCs  No cardiopulmonary history  No flank pain or hematuria.   The following images have been personally viewed and interpreted, results discussed with patient  MRI kidney 12/29/2022-bilateral large simple renal cyst, in the left mid pole there is what appears to be solid renal mass endophytic. 3.1 cm.  Single vein. 2 arteries     04/25/23 - [facility] - oncocytoma  MRI 10/30/23 - Enhancing endophytic mass 3.1cm unchanged in size since 5/3/23    4/29/24 - MRI shows no signs of change in oncocytoma since 2023    UPDATED SUBJECTIVE HISTORY  05/02/24 -patient recently had his midline incisional hernia repaired, doing well.  Here to discuss MRI results    Past Medical History  He has a past medical history of MAT (acute kidney injury) (CMS-HCC), Anxiety, BPH (benign prostatic hyperplasia), Colon polyp, DVT (deep venous thrombosis) ([facility]) (08/03/2023), Fatty liver, GERD (gastroesophageal reflux disease), HL (hearing loss), Hyperlipidemia, Hypertension, Incisional hernia, Renal cyst, Renal lesion (04/2023), Small bowel obstruction ([facility]) (08/21/2023), and Vision loss.    Surgical History  He has a past surgical history that includes Colonoscopy (01/10/2024); Eye surgery; Hemicolectomy (Right); Renal biopsy (04/2023); and Tonsillectomy.     Social History  He reports that he has never smoked. He has never used smokeless tobacco. He reports that he does not currently use alcohol. He reports that he does not use drugs.    Family History  Family History   Problem Relation Name Age of Onset    Stroke Mother      Coronary artery disease Father          Allergies  Patient has no known allergies.    ROS: 12 system review was completed and is negative with the exception of those signs  and symptoms noted in the history of present illness: A 12 system review was completed and is negative with the exception of those signs and symptoms noted in the history of present illness.     Exam:  General: in NAD, appears stated age  Head: normocephalic, atraumatic  Respiratory: normal effort, no use of accessory muscles  Cardiovascular: no edema noted  Skin: normal turgor, no rashes  Neurologic: grossly intact, oriented to person/place/time  Psychiatric: mode and affect appropriate  Midline incision seen, well-healed     Last Recorded Vitals  There were no vitals taken for this visit.    Lab Results   Component Value Date    PSASCREEN 0.90 09/15/2022    CREATININE 0.90 03/27/2024    HGB 13.6 03/25/2024         ASSESSMENT/PLAN:  # 74-year-old male with oncocytoma  -Continue active surveillance  -Repeat MRI in 1 year  -If needs treatment, given extensive surgical history, recommend cryoablation    Rosendo Ziegler MD

## 2024-05-03 DIAGNOSIS — N40.1 BENIGN PROSTATIC HYPERPLASIA WITH URINARY FREQUENCY: ICD-10-CM

## 2024-05-03 DIAGNOSIS — R35.0 BENIGN PROSTATIC HYPERPLASIA WITH URINARY FREQUENCY: ICD-10-CM

## 2024-05-03 RX ORDER — TAMSULOSIN HYDROCHLORIDE 0.4 MG/1
0.4 CAPSULE ORAL DAILY
Qty: 90 CAPSULE | Refills: 2 | Status: SHIPPED | OUTPATIENT
Start: 2024-05-03

## 2024-06-01 ENCOUNTER — APPOINTMENT (OUTPATIENT)
Dept: RADIOLOGY | Facility: HOSPITAL | Age: 74
End: 2024-06-01
Payer: MEDICARE

## 2024-06-01 ENCOUNTER — HOSPITAL ENCOUNTER (EMERGENCY)
Facility: HOSPITAL | Age: 74
Discharge: HOME | End: 2024-06-01
Attending: EMERGENCY MEDICINE
Payer: MEDICARE

## 2024-06-01 VITALS
DIASTOLIC BLOOD PRESSURE: 80 MMHG | OXYGEN SATURATION: 98 % | HEART RATE: 69 BPM | TEMPERATURE: 97.7 F | SYSTOLIC BLOOD PRESSURE: 152 MMHG | BODY MASS INDEX: 33.6 KG/M2 | HEIGHT: 71 IN | WEIGHT: 240 LBS | RESPIRATION RATE: 16 BRPM

## 2024-06-01 DIAGNOSIS — S50.01XA CONTUSION OF RIGHT ELBOW, INITIAL ENCOUNTER: ICD-10-CM
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
## 2024-06-01 DIAGNOSIS — T07.XXXA ABRASIONS OF MULTIPLE SITES: ICD-10-CM

## 2024-06-01 PROCEDURE — 99283 EMERGENCY DEPT VISIT LOW MDM: CPT | Performed by: EMERGENCY MEDICINE

## 2024-06-01 PROCEDURE — 99281 EMR DPT VST MAYX REQ PHY/QHP: CPT

## 2024-06-01 ASSESSMENT — PAIN DESCRIPTION - ORIENTATION: ORIENTATION: RIGHT;LEFT

## 2024-06-01 ASSESSMENT — COLUMBIA-SUICIDE SEVERITY RATING SCALE - C-SSRS
6. HAVE YOU EVER DONE ANYTHING, STARTED TO DO ANYTHING, OR PREPARED TO DO ANYTHING TO END YOUR LIFE?: NO
2. HAVE YOU ACTUALLY HAD ANY THOUGHTS OF KILLING YOURSELF?: NO
1. IN THE PAST MONTH, HAVE YOU WISHED YOU WERE DEAD OR WISHED YOU COULD GO TO SLEEP AND NOT WAKE UP?: NO

## 2024-06-01 ASSESSMENT — PAIN DESCRIPTION - PAIN TYPE: TYPE: ACUTE PAIN

## 2024-06-01 ASSESSMENT — PAIN DESCRIPTION - LOCATION: LOCATION: ELBOW

## 2024-06-01 ASSESSMENT — PAIN - FUNCTIONAL ASSESSMENT: PAIN_FUNCTIONAL_ASSESSMENT: 0-10

## 2024-06-01 ASSESSMENT — PAIN DESCRIPTION - DESCRIPTORS: DESCRIPTORS: ACHING

## 2024-06-01 ASSESSMENT — LIFESTYLE VARIABLES
TOTAL SCORE: 0
HAVE PEOPLE ANNOYED YOU BY CRITICIZING YOUR DRINKING: NO
EVER FELT BAD OR GUILTY ABOUT YOUR DRINKING: NO
HAVE YOU EVER FELT YOU SHOULD CUT DOWN ON YOUR DRINKING: NO
EVER HAD A DRINK FIRST THING IN THE MORNING TO STEADY YOUR NERVES TO GET RID OF A HANGOVER: NO

## 2024-06-01 ASSESSMENT — PAIN SCALES - GENERAL: PAINLEVEL_OUTOF10: 1

## 2024-06-02 NOTE — DISCHARGE INSTRUCTIONS
Clean your wounds clean and dry.  Clean with soap and water.    Return with any signs of infection or any other concerns.

## 2024-06-02 NOTE — ED PROVIDER NOTES
HPI   Chief Complaint   Patient presents with    Fall     Pt presents to ER from home via personal vehicle s/p fall on wooden stairs while carrying suitcases, denies head injury nor anti-coags, on assessment, multiple abrasions to right knees, right elbow and left shin.        74-year-old male presented to ER due to concern for fall.  Patient states he was carrying things upstairs when he excellently slipped fell forward and onto his right side hitting his right elbow and knee.  He was able to ambulate afterwards.  He stated his wife wanted him to be evaluated.  He did not hit his head.  He denies any neck, back pain, chest pain, abdominal pain, shortness of breath.  He has full range of motion of the ankle and knee.  He was ambulating after the fall.  He did not hit his head.  He is not on any blood thinners.      History provided by:  Patient   used: No                        Adelita Coma Scale Score: 15                     Patient History   Past Medical History:   Diagnosis Date    MAT (acute kidney injury) (CMS-HCC)     Anxiety     BPH (benign prostatic hyperplasia)     taking tamsulosin    Colon polyp     DVT (deep venous thrombosis) (Multi) 08/03/2023    provoked after 8H road trip    Fatty liver     GERD (gastroesophageal reflux disease)     F/W PCP- taking protonix    HL (hearing loss)     b/l    Hyperlipidemia     F/W cards- taking simvastatin    Hypertension     F/W cards- takes lisinopril    Incisional hernia     Renal cyst     Renal lesion 04/2023    s/p biopsy    Small bowel obstruction (Multi) 08/21/2023    Vision loss     wears glasses     Past Surgical History:   Procedure Laterality Date    COLONOSCOPY  01/10/2024    EYE SURGERY      Eye Surgery    HEMICOLECTOMY Right     RENAL BIOPSY  04/2023    TONSILLECTOMY       Family History   Problem Relation Name Age of Onset    Stroke Mother      Coronary artery disease Father       Social History     Tobacco Use    Smoking status: Never     Smokeless tobacco: Never   Vaping Use    Vaping status: Never Used   Substance Use Topics    Alcohol use: Not Currently    Drug use: Never       Physical Exam   ED Triage Vitals [06/01/24 2115]   Temperature Heart Rate Respirations BP   36.5 °C (97.7 °F) 69 16 152/80      Pulse Ox Temp Source Heart Rate Source Patient Position   97 % Temporal Monitor Sitting      BP Location FiO2 (%)     Left arm --       Physical Exam  Vitals and nursing note reviewed.   HENT:      Head: Normocephalic.      Right Ear: External ear normal.      Left Ear: External ear normal.      Nose: Nose normal.      Mouth/Throat:      Mouth: Mucous membranes are moist.   Eyes:      Extraocular Movements: Extraocular movements intact.      Conjunctiva/sclera: Conjunctivae normal.      Pupils: Pupils are equal, round, and reactive to light.   Cardiovascular:      Rate and Rhythm: Normal rate and regular rhythm.      Comments: Radial pulse +2 bilaterally  Pulmonary:      Effort: Pulmonary effort is normal.      Breath sounds: No wheezing or rhonchi.   Abdominal:      General: Abdomen is flat. There is no distension.      Tenderness: There is no abdominal tenderness.   Musculoskeletal:         General: No signs of injury. Normal range of motion.   Skin:     General: Skin is warm.      Capillary Refill: Capillary refill takes less than 2 seconds.      Findings: Bruising (Right elbow) and lesion (Abrasions to right elbow and right knee) present.   Neurological:      General: No focal deficit present.      Mental Status: He is alert. Mental status is at baseline.   Psychiatric:         Mood and Affect: Mood normal.         ED Course & MDM   Diagnoses as of 06/02/24 0419   Fall, initial encounter   Contusion of right elbow, initial encounter   Abrasions of multiple sites       Medical Decision Making  74-year-old male presented the ER due to concern for bruising to right elbow and knee.  He is ambulating and has full range of motion is arms and legs.   He did not hit his head and fell hold on CT imaging of his head at this time.  Offered x-ray imaging of elbow and knee however patient did not want this at this time which is reasonable considering his full range of motion and minimal tenderness to palpation over the areas.  Patient able to ambulate without difficulty.  Feel comfortable discharging patient home with instruction to follow-up with her primary care provider.  Strict return precautions given.  Patient was understanding and agreeable with plan for discharge.    Procedure  Procedures     Tong Herr DO  Resident  06/02/24 0419

## 2024-08-12 DIAGNOSIS — F41.9 ANXIETY DISORDER, UNSPECIFIED TYPE: ICD-10-CM

## 2024-08-12 RX ORDER — ESCITALOPRAM OXALATE 5 MG/1
5 TABLET ORAL DAILY
Qty: 90 TABLET | Refills: 1 | Status: SHIPPED | OUTPATIENT
Start: 2024-08-12 | End: 2024-08-12 | Stop reason: SDUPTHER

## 2024-08-12 RX ORDER — ESCITALOPRAM OXALATE 5 MG/1
5 TABLET ORAL DAILY
Qty: 90 TABLET | Refills: 1 | Status: SHIPPED | OUTPATIENT
Start: 2024-08-12

## 2024-10-09 ENCOUNTER — APPOINTMENT (OUTPATIENT)
Dept: PRIMARY CARE | Facility: CLINIC | Age: 74
End: 2024-10-09
Payer: MEDICARE

## 2024-10-09 VITALS
BODY MASS INDEX: 35.01 KG/M2 | WEIGHT: 251 LBS | OXYGEN SATURATION: 97 % | RESPIRATION RATE: 16 BRPM | HEART RATE: 50 BPM | DIASTOLIC BLOOD PRESSURE: 70 MMHG | SYSTOLIC BLOOD PRESSURE: 128 MMHG | TEMPERATURE: 96.8 F

## 2024-10-09 DIAGNOSIS — N28.9 KIDNEY LESION: Primary | ICD-10-CM

## 2024-10-09 DIAGNOSIS — E78.2 MIXED HYPERLIPIDEMIA: ICD-10-CM

## 2024-10-09 DIAGNOSIS — I10 BENIGN ESSENTIAL HYPERTENSION: Chronic | ICD-10-CM

## 2024-10-09 PROCEDURE — 1158F ADVNC CARE PLAN TLK DOCD: CPT | Performed by: INTERNAL MEDICINE

## 2024-10-09 PROCEDURE — 99214 OFFICE O/P EST MOD 30 MIN: CPT | Performed by: INTERNAL MEDICINE

## 2024-10-09 PROCEDURE — 90662 IIV NO PRSV INCREASED AG IM: CPT | Performed by: INTERNAL MEDICINE

## 2024-10-09 PROCEDURE — 3074F SYST BP LT 130 MM HG: CPT | Performed by: INTERNAL MEDICINE

## 2024-10-09 PROCEDURE — G0008 ADMIN INFLUENZA VIRUS VAC: HCPCS | Performed by: INTERNAL MEDICINE

## 2024-10-09 PROCEDURE — 1123F ACP DISCUSS/DSCN MKR DOCD: CPT | Performed by: INTERNAL MEDICINE

## 2024-10-09 PROCEDURE — 1159F MED LIST DOCD IN RCRD: CPT | Performed by: INTERNAL MEDICINE

## 2024-10-09 PROCEDURE — 1036F TOBACCO NON-USER: CPT | Performed by: INTERNAL MEDICINE

## 2024-10-09 PROCEDURE — 1160F RVW MEDS BY RX/DR IN RCRD: CPT | Performed by: INTERNAL MEDICINE

## 2024-10-09 PROCEDURE — 3078F DIAST BP <80 MM HG: CPT | Performed by: INTERNAL MEDICINE

## 2024-10-09 PROCEDURE — G2211 COMPLEX E/M VISIT ADD ON: HCPCS | Performed by: INTERNAL MEDICINE

## 2024-10-09 ASSESSMENT — ENCOUNTER SYMPTOMS
DIARRHEA: 0
DYSPHORIC MOOD: 1
ABDOMINAL PAIN: 0
CONSTIPATION: 0
SLEEP DISTURBANCE: 0

## 2024-10-09 NOTE — PROGRESS NOTES
Patient here for a 6 month follow up    Subjective   Patient ID: Jovanny Yap is a 74 y.o. male who presents for Follow-up.    The patient reports a mechanical fall while carrying suitcases up the stairs in 6/2024.  He was evaluated at the ED where significant injuries were ruled out, and he was discharged home the same day.    The patient notes he has been feeling low mood and irritable in the morning upon waking which improves as the day progresses.  He has been taking escitalopram 5mg once daily in the mornings as well.  The patient prefers to avoid any further medication.  He denies any sleep disturbance.    The patient mentions ongoing nocturia of two to three times per evening.  He continues to follow  from Urology, and is scheduled for his next appointment in 2025.    The patient denies any abdominal pain or bowel problems.  He has recovered well from open repair of a ventral abdominal hernia with mesh on 3/22/2024 with  from General Surgery.    The patient reports mild difficulty with short term memory, though he does not believe that management is necessary as yet.  He is able to remember the forgotten word within a few minutes during conversations.       Review of Systems   Gastrointestinal:  Negative for abdominal pain, constipation and diarrhea.   Genitourinary:         Positive for nocturia of two to three times per evening.   Neurological:         Positive for mild difficulty with short term memory.   Psychiatric/Behavioral:  Positive for dysphoric mood. Negative for sleep disturbance.        Objective   Physical Exam  Constitutional:       Appearance: Normal appearance.   Neck:      Vascular: No carotid bruit.   Cardiovascular:      Rate and Rhythm: Normal rate and regular rhythm.      Heart sounds: Normal heart sounds.   Pulmonary:      Effort: Pulmonary effort is normal.      Breath sounds: Normal breath sounds.   Abdominal:      General: Bowel sounds are normal.      Palpations:  Abdomen is soft.      Tenderness: There is no abdominal tenderness.   Skin:     General: Skin is warm and dry.   Neurological:      General: No focal deficit present.      Mental Status: He is alert and oriented to person, place, and time. Mental status is at baseline.   Psychiatric:         Mood and Affect: Mood normal.         Behavior: Behavior normal.         Assessment/Plan       IMPRESSION/PLAN:     Short Term Memory Loss  - Mild.  Patient declines referral to Neurology.  Monitor for now.  Call the clinic if symptoms persist or worsen.     Anxiety Disorder  - Mild increase of symptoms in the mornings.  Patient prefers to hold off on medication changes.  Monitor for now.  Continue with escitalopram mg once daily in the morning.  Call the clinic if symptoms persist or worsen.     HTN  - /70 today in office.  Last Stress Test 7/2023 unremarkable.  Following with  from Cardiology.     HLD   - Stable, continue on simvastatin 20mg QD.  CT Cardiac Score 8.77 per 11/2022.     Excess Cerumen  - Visible cerumen on external canal, and recommended patient wipe with warm wash cloth.  TM normal bilaterally.    Small Bowel Obstruction   - s/p exploratory Laparotomy, lysis of adhesions, open repair of incisional hernia with mesh, Myofacial release x2, TAP Block, and excision of abdominal scar on 3/22/2024 with  from General Surgery.  Second SBO episode 8/21/2023 treated conservatively.  First SBO 12/2022 treated conservatively.      Ventral Abdominal Hernia  - s/p open repair with mesh 3/22/2024 with  from General Surgery.    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  Incidental CT Abdomen finding of 3.4 x 2.7 cm ill-defined hypodense area in the interpolar region of the left kidney. MRI Abdomen 3/2023 showed no significant interval change known oncocytic lesion left kidney, and newly seen left perinephric abnormality compatible with small hematoma with surrounding enhancing tissue  likely granulation tissue. Following  from Urology.       BPH   - Last PSA in normal range - 4/2024, maintained on tamsulosin 0.4mg QD.     Chronic Fatigue Syndrome/Depression   - Symptoms stable on escitalopram 5mg QD. Continue with Vitamin B12 1000 mcg QD per Psychiatry. Discontinued bupropion ER 150mg QD after small bowel obstruction in 12/2022.     Health Maintenance   - Routine labs 4/2024. Last PSA wnl 4/2024.  Last colonoscopy performed 1/2024, repeat due 1/2027. Patient received High Dose Influenza vaccine in the clinic today, tolerated well.      Follow up in 6 months, call sooner if needed.       Scribe Attestation  By signing my name below, I, Herberth Henson   attest that this documentation has been prepared under the direction and in the presence of Jhonny Cuevas DO.   Iris Roque 10/09/24 1:28 PM

## 2025-01-21 DIAGNOSIS — K21.9 GASTROESOPHAGEAL REFLUX DISEASE WITHOUT ESOPHAGITIS: ICD-10-CM

## 2025-01-21 RX ORDER — PANTOPRAZOLE SODIUM 40 MG/1
40 TABLET, DELAYED RELEASE ORAL DAILY
Qty: 90 TABLET | Refills: 2 | Status: SHIPPED | OUTPATIENT
Start: 2025-01-21

## 2025-01-27 DIAGNOSIS — I10 BENIGN ESSENTIAL HYPERTENSION: Chronic | ICD-10-CM

## 2025-01-27 RX ORDER — LISINOPRIL 10 MG/1
10 TABLET ORAL DAILY
Qty: 90 TABLET | Refills: 3 | Status: SHIPPED | OUTPATIENT
Start: 2025-01-27

## 2025-02-17 ENCOUNTER — APPOINTMENT (OUTPATIENT)
Dept: PRIMARY CARE | Facility: CLINIC | Age: 75
End: 2025-02-17
Payer: MEDICARE

## 2025-02-17 VITALS
BODY MASS INDEX: 35.29 KG/M2 | TEMPERATURE: 97.7 F | OXYGEN SATURATION: 97 % | DIASTOLIC BLOOD PRESSURE: 80 MMHG | RESPIRATION RATE: 16 BRPM | SYSTOLIC BLOOD PRESSURE: 128 MMHG | HEART RATE: 63 BPM | WEIGHT: 253 LBS

## 2025-02-17 DIAGNOSIS — F41.9 ANXIETY DISORDER, UNSPECIFIED TYPE: ICD-10-CM

## 2025-02-17 DIAGNOSIS — E78.2 MIXED HYPERLIPIDEMIA: Chronic | ICD-10-CM

## 2025-02-17 DIAGNOSIS — E78.2 MIXED HYPERLIPIDEMIA: Primary | ICD-10-CM

## 2025-02-17 DIAGNOSIS — R25.1 TREMOR: ICD-10-CM

## 2025-02-17 DIAGNOSIS — Z12.5 PROSTATE CANCER SCREENING: ICD-10-CM

## 2025-02-17 DIAGNOSIS — N40.1 BENIGN PROSTATIC HYPERPLASIA WITH URINARY FREQUENCY: ICD-10-CM

## 2025-02-17 DIAGNOSIS — R35.0 BENIGN PROSTATIC HYPERPLASIA WITH URINARY FREQUENCY: ICD-10-CM

## 2025-02-17 PROCEDURE — 3074F SYST BP LT 130 MM HG: CPT | Performed by: INTERNAL MEDICINE

## 2025-02-17 PROCEDURE — 3079F DIAST BP 80-89 MM HG: CPT | Performed by: INTERNAL MEDICINE

## 2025-02-17 PROCEDURE — 1159F MED LIST DOCD IN RCRD: CPT | Performed by: INTERNAL MEDICINE

## 2025-02-17 PROCEDURE — 99214 OFFICE O/P EST MOD 30 MIN: CPT | Performed by: INTERNAL MEDICINE

## 2025-02-17 PROCEDURE — 1160F RVW MEDS BY RX/DR IN RCRD: CPT | Performed by: INTERNAL MEDICINE

## 2025-02-17 PROCEDURE — G2211 COMPLEX E/M VISIT ADD ON: HCPCS | Performed by: INTERNAL MEDICINE

## 2025-02-17 PROCEDURE — 1036F TOBACCO NON-USER: CPT | Performed by: INTERNAL MEDICINE

## 2025-02-17 RX ORDER — SIMVASTATIN 20 MG/1
20 TABLET, FILM COATED ORAL EVERY EVENING
Qty: 90 TABLET | Refills: 3 | Status: SHIPPED | OUTPATIENT
Start: 2025-02-17

## 2025-02-17 RX ORDER — ESCITALOPRAM OXALATE 5 MG/1
5 TABLET ORAL DAILY
Qty: 90 TABLET | Refills: 1 | Status: SHIPPED | OUTPATIENT
Start: 2025-02-17

## 2025-02-17 RX ORDER — TAMSULOSIN HYDROCHLORIDE 0.4 MG/1
0.4 CAPSULE ORAL DAILY
Qty: 90 CAPSULE | Refills: 2 | Status: SHIPPED | OUTPATIENT
Start: 2025-02-17

## 2025-02-17 ASSESSMENT — ENCOUNTER SYMPTOMS: TREMORS: 1

## 2025-02-17 ASSESSMENT — PATIENT HEALTH QUESTIONNAIRE - PHQ9
SUM OF ALL RESPONSES TO PHQ9 QUESTIONS 1 AND 2: 0
2. FEELING DOWN, DEPRESSED OR HOPELESS: NOT AT ALL
1. LITTLE INTEREST OR PLEASURE IN DOING THINGS: NOT AT ALL

## 2025-02-17 NOTE — PROGRESS NOTES
"Tremors in both hands     Subjective   Patient ID: Jovanny Yap is a 74 y.o. male who presents for Tremors.  He is accompanied by his wife who offers some of the history.       The patient reports occasional tremors primarily in the hands that seem that have become more noticeable since 2/15/2025.  Similar symptoms occur in the \"legs and shoulders\" though on a much less frequent basis as well.  The patient denies any difficulty with balance or ambulation, headaches, vision changes or weakness. His brother was diagnosed with Parkinson's Disease in 2022.    The patient has been taking escitalopram 5mg once daily, and is unsure whether this has been helping.  He does not believe an increase in dosage is necessary at this time, and suspects that the upcoming MRI Brain will help manage anxiety.    The patient notes that the short term memory loss discussed during the last clinic visit is ongoing.      The patient continues to follow with  from Urology, and is scheduled for an upcoming MRI for surveillance of a renal lesion.    Tremor    Review of Systems   Neurological:  Positive for tremors.     Objective   Physical Exam  Constitutional:       Appearance: Normal appearance.   Neck:      Vascular: No carotid bruit.   Cardiovascular:      Rate and Rhythm: Normal rate and regular rhythm.      Heart sounds: Normal heart sounds.   Pulmonary:      Effort: Pulmonary effort is normal.      Breath sounds: Normal breath sounds.   Abdominal:      General: Bowel sounds are normal.      Palpations: Abdomen is soft.      Tenderness: There is no abdominal tenderness.   Skin:     General: Skin is warm and dry.   Neurological:      General: No focal deficit present.      Mental Status: He is alert and oriented to person, place, and time. Mental status is at baseline.   Psychiatric:         Mood and Affect: Mood normal.         Behavior: Behavior normal.       Assessment/Plan   Problem List Items Addressed This Visit          "    ICD-10-CM    Anxiety disorder F41.9    Relevant Medications    escitalopram (Lexapro) 5 mg tablet    Hyperlipidemia - Primary (Chronic) E78.5    Relevant Orders    Lipid panel    CBC and Auto Differential    Comprehensive metabolic panel     Other Visit Diagnoses         Codes    Benign prostatic hyperplasia with urinary frequency     N40.1, R35.0    Relevant Medications    tamsulosin (Flomax) 0.4 mg 24 hr capsule    Prostate cancer screening     Z12.5    Relevant Orders    Prostate Spec.Ag,Screen    Tremor     R25.1    Relevant Orders    MR brain w and wo IV contrast    Referral to Neurology    Tsh With Reflex To Free T4 If Abnormal    Vitamin B12            IMPRESSION/PLAN:     Tremor  - Increasing.  Ordered MRI Brain.  Also ordered referral to Neurology with .  Will consider additional management pending results.     Short Term Memory Loss  - Mild.  Ordered Vitamin B12 and TSH, and referral for Neurology ordered for tremors as above.  Call the clinic if symptoms persist or worsen.     HTN  - /80 today in office.  Last Stress Test 7/2023 unremarkable.  Following with  from Cardiology.     HLD   - Stable, continue on simvastatin 20mg QD.  CT Cardiac Score 8.77 per 11/2022.     Anxiety Disorder  - Mild increase of symptoms in the mornings.  Continue with 5mg escitalopram mg once daily in the morning.  Call the clinic if symptoms persist or worsen.     Excess Cerumen  - Visible cerumen on external canal, and recommended patient wipe with warm wash cloth.  TM normal bilaterally.     Small Bowel Obstruction   - s/p exploratory Laparotomy, lysis of adhesions, open repair of incisional hernia with mesh, Myofacial release x2, TAP Block, and excision of abdominal scar on 3/22/2024 with  from General Surgery.  Second SBO episode 8/21/2023 treated conservatively.  First SBO 12/2022 treated conservatively.       Ventral Abdominal Hernia  - s/p open repair with mesh 3/22/2024 with   from General Surgery.     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  Incidental CT Abdomen finding of 3.4 x 2.7 cm ill-defined hypodense area in the interpolar region of the left kidney. MRI Abdomen 3/2023 showed no significant interval change known oncocytic lesion left kidney, and newly seen left perinephric abnormality compatible with small hematoma with surrounding enhancing tissue likely granulation tissue. Following  from Urology.       BPH   - Last PSA in normal range - 4/2024, maintained on tamsulosin 0.4mg QD.     Chronic Fatigue Syndrome/Depression   - Symptoms stable on escitalopram 5mg QD. Continue with Vitamin B12 1000 mcg QD per Psychiatry. Discontinued bupropion ER 150mg QD after small bowel obstruction in 12/2022.     Health Maintenance   - Routine labs ordered including CBC, CMP, and a lipid panel to be completed in the fasting state. Added PSA. Last PSA wnl 4/2024.  Last colonoscopy performed 1/2024, repeat due 1/2027.      Follow up in 6 months, call sooner if needed.       Scribe Attestation  By signing my name below, I, Herberth Henson   attest that this documentation has been prepared under the direction and in the presence of Jhonny Cuevas DO.   Iris Roque 02/17/25 11:07 AM

## 2025-02-18 LAB
ALBUMIN SERPL-MCNC: 4.6 G/DL (ref 3.6–5.1)
ALP SERPL-CCNC: 69 U/L (ref 35–144)
ALT SERPL-CCNC: 29 U/L (ref 9–46)
ANION GAP SERPL CALCULATED.4IONS-SCNC: 9 MMOL/L (CALC) (ref 7–17)
AST SERPL-CCNC: 25 U/L (ref 10–35)
BASOPHILS # BLD AUTO: 20 CELLS/UL (ref 0–200)
BASOPHILS NFR BLD AUTO: 0.3 %
BILIRUB SERPL-MCNC: 0.9 MG/DL (ref 0.2–1.2)
BUN SERPL-MCNC: 19 MG/DL (ref 7–25)
CALCIUM SERPL-MCNC: 9.3 MG/DL (ref 8.6–10.3)
CHLORIDE SERPL-SCNC: 104 MMOL/L (ref 98–110)
CHOLEST SERPL-MCNC: 129 MG/DL
CHOLEST/HDLC SERPL: 3.1 (CALC)
CO2 SERPL-SCNC: 29 MMOL/L (ref 20–32)
CREAT SERPL-MCNC: 1.1 MG/DL (ref 0.7–1.28)
EGFRCR SERPLBLD CKD-EPI 2021: 70 ML/MIN/1.73M2
EOSINOPHIL # BLD AUTO: 13 CELLS/UL (ref 15–500)
EOSINOPHIL NFR BLD AUTO: 0.2 %
ERYTHROCYTE [DISTWIDTH] IN BLOOD BY AUTOMATED COUNT: 12.5 % (ref 11–15)
GLUCOSE SERPL-MCNC: 99 MG/DL (ref 65–99)
HCT VFR BLD AUTO: 49.2 % (ref 38.5–50)
HDLC SERPL-MCNC: 42 MG/DL
HGB BLD-MCNC: 16.6 G/DL (ref 13.2–17.1)
LDLC SERPL CALC-MCNC: 68 MG/DL (CALC)
LYMPHOCYTES # BLD AUTO: 983 CELLS/UL (ref 850–3900)
LYMPHOCYTES NFR BLD AUTO: 14.9 %
MCH RBC QN AUTO: 32 PG (ref 27–33)
MCHC RBC AUTO-ENTMCNC: 33.7 G/DL (ref 32–36)
MCV RBC AUTO: 95 FL (ref 80–100)
MONOCYTES # BLD AUTO: 396 CELLS/UL (ref 200–950)
MONOCYTES NFR BLD AUTO: 6 %
NEUTROPHILS # BLD AUTO: 5188 CELLS/UL (ref 1500–7800)
NEUTROPHILS NFR BLD AUTO: 78.6 %
NONHDLC SERPL-MCNC: 87 MG/DL (CALC)
PLATELET # BLD AUTO: 166 THOUSAND/UL (ref 140–400)
PMV BLD REES-ECKER: 10.6 FL (ref 7.5–12.5)
POTASSIUM SERPL-SCNC: 4.4 MMOL/L (ref 3.5–5.3)
PROT SERPL-MCNC: 6.9 G/DL (ref 6.1–8.1)
PSA SERPL-MCNC: 0.75 NG/ML
RBC # BLD AUTO: 5.18 MILLION/UL (ref 4.2–5.8)
SODIUM SERPL-SCNC: 142 MMOL/L (ref 135–146)
TRIGL SERPL-MCNC: 107 MG/DL
TSH SERPL-ACNC: 1.79 MIU/L (ref 0.4–4.5)
VIT B12 SERPL-MCNC: 1012 PG/ML (ref 200–1100)
WBC # BLD AUTO: 6.6 THOUSAND/UL (ref 3.8–10.8)

## 2025-02-19 ENCOUNTER — TELEPHONE (OUTPATIENT)
Dept: PRIMARY CARE | Facility: CLINIC | Age: 75
End: 2025-02-19
Payer: MEDICARE

## 2025-02-19 NOTE — TELEPHONE ENCOUNTER
Pt was in yesterday to see Dr. Cuevas and has set up his MRI for 3/04/25 however he is unable to get in with Dr. Wetzel the neurologist until June. Pt would like to know if Dr. Cuevas could get him in sooner. Please advise

## 2025-03-04 ENCOUNTER — HOSPITAL ENCOUNTER (OUTPATIENT)
Dept: RADIOLOGY | Facility: CLINIC | Age: 75
Discharge: HOME | End: 2025-03-04
Payer: MEDICARE

## 2025-03-04 DIAGNOSIS — R25.1 TREMOR: ICD-10-CM

## 2025-03-04 PROCEDURE — 70553 MRI BRAIN STEM W/O & W/DYE: CPT

## 2025-03-04 PROCEDURE — 2550000001 HC RX 255 CONTRASTS: Performed by: INTERNAL MEDICINE

## 2025-03-04 PROCEDURE — A9575 INJ GADOTERATE MEGLUMI 0.1ML: HCPCS | Performed by: INTERNAL MEDICINE

## 2025-03-04 RX ORDER — GADOTERATE MEGLUMINE 376.9 MG/ML
0.2 INJECTION INTRAVENOUS
Status: COMPLETED | OUTPATIENT
Start: 2025-03-04 | End: 2025-03-04

## 2025-03-04 RX ADMIN — GADOTERATE MEGLUMINE 20 ML: 376.9 INJECTION INTRAVENOUS at 11:52

## 2025-03-10 ENCOUNTER — TELEPHONE (OUTPATIENT)
Dept: PRIMARY CARE | Facility: CLINIC | Age: 75
End: 2025-03-10
Payer: MEDICARE

## 2025-03-12 ENCOUNTER — OFFICE VISIT (OUTPATIENT)
Dept: PRIMARY CARE | Facility: CLINIC | Age: 75
End: 2025-03-12
Payer: MEDICARE

## 2025-03-12 VITALS
HEART RATE: 73 BPM | WEIGHT: 256 LBS | SYSTOLIC BLOOD PRESSURE: 128 MMHG | OXYGEN SATURATION: 98 % | BODY MASS INDEX: 35.84 KG/M2 | HEIGHT: 71 IN | RESPIRATION RATE: 16 BRPM | TEMPERATURE: 97.9 F | DIASTOLIC BLOOD PRESSURE: 70 MMHG

## 2025-03-12 DIAGNOSIS — R25.1 TREMOR: ICD-10-CM

## 2025-03-12 DIAGNOSIS — E78.2 MIXED HYPERLIPIDEMIA: Chronic | ICD-10-CM

## 2025-03-12 DIAGNOSIS — Z00.00 MEDICARE ANNUAL WELLNESS VISIT, SUBSEQUENT: Primary | ICD-10-CM

## 2025-03-12 DIAGNOSIS — I10 BENIGN ESSENTIAL HYPERTENSION: Chronic | ICD-10-CM

## 2025-03-12 PROCEDURE — 1159F MED LIST DOCD IN RCRD: CPT | Performed by: INTERNAL MEDICINE

## 2025-03-12 PROCEDURE — 1036F TOBACCO NON-USER: CPT | Performed by: INTERNAL MEDICINE

## 2025-03-12 PROCEDURE — 1160F RVW MEDS BY RX/DR IN RCRD: CPT | Performed by: INTERNAL MEDICINE

## 2025-03-12 PROCEDURE — 1170F FXNL STATUS ASSESSED: CPT | Performed by: INTERNAL MEDICINE

## 2025-03-12 PROCEDURE — 99214 OFFICE O/P EST MOD 30 MIN: CPT | Performed by: INTERNAL MEDICINE

## 2025-03-12 PROCEDURE — 3078F DIAST BP <80 MM HG: CPT | Performed by: INTERNAL MEDICINE

## 2025-03-12 PROCEDURE — 3074F SYST BP LT 130 MM HG: CPT | Performed by: INTERNAL MEDICINE

## 2025-03-12 PROCEDURE — G2211 COMPLEX E/M VISIT ADD ON: HCPCS | Performed by: INTERNAL MEDICINE

## 2025-03-12 PROCEDURE — 3008F BODY MASS INDEX DOCD: CPT | Performed by: INTERNAL MEDICINE

## 2025-03-12 PROCEDURE — G0439 PPPS, SUBSEQ VISIT: HCPCS | Performed by: INTERNAL MEDICINE

## 2025-03-12 PROCEDURE — 99397 PER PM REEVAL EST PAT 65+ YR: CPT | Performed by: INTERNAL MEDICINE

## 2025-03-12 ASSESSMENT — ENCOUNTER SYMPTOMS
FREQUENCY: 0
ACTIVITY CHANGE: 1
DIARRHEA: 0
ABDOMINAL PAIN: 0
CONSTIPATION: 0

## 2025-03-12 ASSESSMENT — ACTIVITIES OF DAILY LIVING (ADL)
MANAGING_FINANCES: INDEPENDENT
DRESSING: INDEPENDENT
BATHING: INDEPENDENT
GROCERY_SHOPPING: INDEPENDENT
DOING_HOUSEWORK: INDEPENDENT
TAKING_MEDICATION: INDEPENDENT

## 2025-03-12 NOTE — PROGRESS NOTES
Patient here for a medicare wellness visit, physical , follow up    Subjective   Patient ID: Jovanny Yap is a 74 y.o. male who presents for Annual Exam, Medicare Annual Wellness Visit Subsequent, and Follow-up.    The patient is scheduled to establish with  from Neurology in 6/2025 for recent onset of tremors.  His brother has a history of Parkinson's Disease.    The patient reports low mood and energy levels upon waking in the morning that seems to improve as the day progresses.  His wife has mentioned that he he snores significantly in the evenings.  The patient is maintained with escitalopram 5mg once daily, and believes that he takes this medication in the mornings.     The patient notes nocturia of two or three times per evening, and denies any other urinary symptoms.  He continues to follow with  from Urology for a history of renal cyst, and states that the condition is stable.  The patient will be undergoing surveillance MRI imaging in one year per the note.    The patient denies any significant hearing impairment, abdominal pain, or bowel problems.     The patient is planning an upcoming trip to [place] where he grew up, along with his wife and family.      Review of Systems   Constitutional:  Positive for activity change.   HENT:  Negative for hearing loss.    Gastrointestinal:  Negative for abdominal pain, constipation and diarrhea.   Genitourinary:  Negative for frequency.        Positive for nocturia of two to three times per evening.     Objective   Physical Exam  Constitutional:       Appearance: Normal appearance.   Neck:      Vascular: No carotid bruit.   Cardiovascular:      Rate and Rhythm: Normal rate and regular rhythm.      Heart sounds: Normal heart sounds.   Pulmonary:      Effort: Pulmonary effort is normal.      Breath sounds: Normal breath sounds.   Abdominal:      General: Bowel sounds are normal.      Palpations: Abdomen is soft.      Tenderness: There is no  abdominal tenderness.   Skin:     General: Skin is warm and dry.   Neurological:      General: No focal deficit present.      Mental Status: He is alert and oriented to person, place, and time. Mental status is at baseline.   Psychiatric:         Mood and Affect: Mood normal.         Behavior: Behavior normal.         Assessment/Plan   Problem List Items Addressed This Visit             ICD-10-CM    Benign essential hypertension - Primary (Chronic) I10    Hyperlipidemia (Chronic) E78.5       Medicare Wellness Examination Done  -  Discussed healthy diet and regular exercise.    -  Physical exam overall unremarkable. Immunizations reviewed and updated accordingly. Healthy lifestyle choices discussed (tobacco avoidance, appropriate alcohol use, avoidance of illicit substances).   -  Patient is wearing seatbelt.   -  Screening lab work ordered as indicated.    -  Age appropriate screening tests reviewed with patient.       IMPRESSION/PLAN:      Possible Sleep Apnea  - Offered to order Home Sleep Apnea Test, but patient would like to hold off for now.  Call the clinic if symptoms persist or worsen.     HTN  - /70 today in office.  Last Stress Test 7/2023 unremarkable.  Following with  from Cardiology.     HLD   - Stable, continue on simvastatin 20mg QD.  CT Cardiac Score 8.77 per 11/2022.     Anxiety Disorder  - Mild increase of symptoms in the mornings.  Continue with 5mg escitalopram mg once daily in the morning.  Call the clinic if symptoms persist or worsen.      Short Term Memory Loss  - Mild.  Vitamin B12 and TSH wnl 2/2025.  Previously ordered referral for Neurology.  Call the clinic if symptoms persist or worsen.     Tremor  -  MRI Brain 3/2025 showed no acute infarct, recent hemorrhage, mass effect, or abnormal  Enhancement.  Minimal periventricular and subcortical white matter FLAIR hyperintensities likely reflecting sequela of chronic microvascular ischemia.  Previously ordered referral to  Neurology with .     Excess Cerumen  - Visible cerumen on external canal, and recommended patient wipe with warm wash cloth.  TM normal bilaterally.     Small Bowel Obstruction   - s/p exploratory Laparotomy, lysis of adhesions, open repair of incisional hernia with mesh, Myofacial release x2, TAP Block, and excision of abdominal scar on 3/22/2024 with  from General Surgery.  Second SBO episode 8/21/2023 treated conservatively.  First SBO 12/2022 treated conservatively.       Ventral Abdominal Hernia  - s/p open repair with mesh 3/22/2024 with  from General Surgery.     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  Incidental CT Abdomen finding of 3.4 x 2.7 cm ill-defined hypodense area in the interpolar region of the left kidney. MRI Abdomen 3/2023 showed no significant interval change known oncocytic lesion left kidney, and newly seen left perinephric abnormality compatible with small hematoma with surrounding enhancing tissue likely granulation tissue. Following  from Urology.       BPH   - Last PSA in normal range - 4/2024, maintained on tamsulosin 0.4mg QD.     Chronic Fatigue Syndrome/Depression   - Symptoms stable on escitalopram 5mg QD. Continue with Vitamin B12 1000 mcg QD per Psychiatry. Discontinued bupropion ER 150mg QD after small bowel obstruction in 12/2022.     Health Maintenance   - Routine labs 2/2025. Last PSA wnl 2/2025.  Last colonoscopy performed 1/2024, repeat due 1/2027.      Follow up in 6 months, call sooner if needed.       Scribe Attestation  By signing my name below, I, Herberth Henson   attest that this documentation has been prepared under the direction and in the presence of Jhonny Cuevas DO.   Iris Roque 03/12/25 2:35 PM

## 2025-04-11 ENCOUNTER — APPOINTMENT (OUTPATIENT)
Dept: PRIMARY CARE | Facility: CLINIC | Age: 75
End: 2025-04-11
Payer: MEDICARE

## 2025-04-28 ENCOUNTER — HOSPITAL ENCOUNTER (OUTPATIENT)
Dept: RADIOLOGY | Facility: CLINIC | Age: 75
End: 2025-04-28
Payer: MEDICARE

## 2025-05-01 ENCOUNTER — APPOINTMENT (OUTPATIENT)
Dept: UROLOGY | Facility: CLINIC | Age: 75
End: 2025-05-01
Payer: MEDICARE

## 2025-05-02 ENCOUNTER — HOSPITAL ENCOUNTER (OUTPATIENT)
Dept: RADIOLOGY | Facility: HOSPITAL | Age: 75
Discharge: HOME | End: 2025-05-02
Payer: MEDICARE

## 2025-05-02 DIAGNOSIS — D30.00 RENAL ONCOCYTOMA, UNSPECIFIED LATERALITY: ICD-10-CM

## 2025-05-02 PROCEDURE — 74183 MRI ABD W/O CNTR FLWD CNTR: CPT

## 2025-05-02 PROCEDURE — A9575 INJ GADOTERATE MEGLUMI 0.1ML: HCPCS | Performed by: UROLOGY

## 2025-05-02 PROCEDURE — 2550000001 HC RX 255 CONTRASTS: Performed by: UROLOGY

## 2025-05-02 RX ORDER — GADOTERATE MEGLUMINE 376.9 MG/ML
23 INJECTION INTRAVENOUS
Status: COMPLETED | OUTPATIENT
Start: 2025-05-02 | End: 2025-05-02

## 2025-05-02 RX ADMIN — GADOTERATE MEGLUMINE 23 ML: 376.9 INJECTION INTRAVENOUS at 15:48

## 2025-05-08 ENCOUNTER — OFFICE VISIT (OUTPATIENT)
Dept: UROLOGY | Facility: CLINIC | Age: 75
End: 2025-05-08
Payer: MEDICARE

## 2025-05-08 ENCOUNTER — APPOINTMENT (OUTPATIENT)
Dept: RADIOLOGY | Facility: HOSPITAL | Age: 75
End: 2025-05-08
Payer: MEDICARE

## 2025-05-08 VITALS — HEART RATE: 64 BPM | TEMPERATURE: 97.5 F | SYSTOLIC BLOOD PRESSURE: 150 MMHG | DIASTOLIC BLOOD PRESSURE: 86 MMHG

## 2025-05-08 DIAGNOSIS — K86.2 PANCREATIC CYST (HHS-HCC): Primary | ICD-10-CM

## 2025-05-08 DIAGNOSIS — D30.00 RENAL ONCOCYTOMA, UNSPECIFIED LATERALITY: ICD-10-CM

## 2025-05-08 PROCEDURE — 99214 OFFICE O/P EST MOD 30 MIN: CPT | Performed by: UROLOGY

## 2025-05-08 PROCEDURE — 3079F DIAST BP 80-89 MM HG: CPT | Performed by: UROLOGY

## 2025-05-08 PROCEDURE — 3077F SYST BP >= 140 MM HG: CPT | Performed by: UROLOGY

## 2025-05-08 PROCEDURE — 1036F TOBACCO NON-USER: CPT | Performed by: UROLOGY

## 2025-05-08 PROCEDURE — G2211 COMPLEX E/M VISIT ADD ON: HCPCS | Performed by: UROLOGY

## 2025-05-08 PROCEDURE — 1159F MED LIST DOCD IN RCRD: CPT | Performed by: UROLOGY

## 2025-05-08 PROCEDURE — 1160F RVW MEDS BY RX/DR IN RCRD: CPT | Performed by: UROLOGY

## 2025-05-08 ASSESSMENT — ENCOUNTER SYMPTOMS
LOSS OF SENSATION IN FEET: 0
DEPRESSION: 0
OCCASIONAL FEELINGS OF UNSTEADINESS: 0

## 2025-05-08 NOTE — PROGRESS NOTES
PAST UROLOGICAL HISTORY:  75-year-old man with a renal mass biopsy in 2023 showing oncocytoma. MRI on 12/29/2022 revealed bilateral large simple renal cysts and a 3.1 cm solid endophytic renal mass in the left mid pole. Follow-up MRIs on 05/03/2023 and 10/30/2023 showed the enhancing endophytic mass remained stable at 3.1 cm. Past medical history includes anxiety, BMI 34, GERD, hydrocele, post-operative constipation, DVT, and partial small bowel obstruction. Surgical history includes prior midline incision for partial colectomy and midline incisional hernia repair in 2024.    The following images have been personally viewed and interpreted, results discussed with patient as noted herein.    HPI TODAY 05/08/2025:    Left Renal Mass:  - MRI on 05/02/2025 shows continued presence of the known exophytic lesion, now measuring 3.6 cm, increased from 3.3 cm previously.  - No reports of flank pain or hematuria.  - Patient recently had midline incisional hernia repair and is doing well.    PSA History:  - 02/17/2025: 0.75    PAST MEDICAL HISTORY:  - Anxiety  - BMI 34  - GERD  - Hydrocele  - Post-operative constipation  - DVT  - Partial small bowel obstruction (pSBO)  - Cataracts, with surgery scheduled for mid-June and July 2025  - On Xarelto and baby aspirin  - Creatinine 1.1, previous 1.01  - Hemoglobin 14.5  - UA without RBCs  - No cardiopulmonary history    SOCIAL:  - Lives in [place]  - Planning family trip to [place] on 06/07/2025    REVIEW OF SYSTEMS: A tailored review of systems was performed and all pertinent positives and negatives are listed in the HPI.     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  - Plan:    - Relatively rapid interval growth and  now approaching 4cm. Discussed false negatives  -  Referral to Interventional Radiology for cryoablation    - Will attempt to schedule around his [place] trip on 06/07/2025    - Patient to stop baby aspirin 7 days before procedure    - Follow-up MRI in 6 months    - Follow-up appointment in 6 months  - Counseling: Risks, benefits, and alternatives were discussed including but not limited to bleeding, pain, infection, injury to surrounding structures, and need for repeat procedure. Discussed that even non-cancerous tumors like oncocytoma can grow over time. Discussed that cryoablation is preferred over surgery given patient's history of multiple abdominal surgeries. Discussed that cryoablation is performed under general anesthesia as an outpatient procedure with several hours of observation afterward.    Pancreatic Cyst (K86.2)  - Assessment: Incidental finding of pancreatic cyst on MRI.  - Plan:    - Referral to pancreatic cyst clinic for evaluation    - [place]  - Counseling: Discussed that pancreatic cysts are common incidental findings on MRI and most require monitoring rather than intervention.

## 2025-05-12 ENCOUNTER — APPOINTMENT (OUTPATIENT)
Dept: RADIOLOGY | Facility: HOSPITAL | Age: 75
End: 2025-05-12
Payer: MEDICARE

## 2025-05-12 ENCOUNTER — APPOINTMENT (OUTPATIENT)
Dept: CARDIOLOGY | Facility: HOSPITAL | Age: 75
End: 2025-05-12
Payer: MEDICARE

## 2025-05-12 ENCOUNTER — HOSPITAL ENCOUNTER (EMERGENCY)
Facility: HOSPITAL | Age: 75
Discharge: HOME | End: 2025-05-12
Attending: STUDENT IN AN ORGANIZED HEALTH CARE EDUCATION/TRAINING PROGRAM
Payer: MEDICARE

## 2025-05-12 VITALS
HEIGHT: 71 IN | OXYGEN SATURATION: 94 % | HEART RATE: 65 BPM | TEMPERATURE: 96.8 F | DIASTOLIC BLOOD PRESSURE: 72 MMHG | RESPIRATION RATE: 15 BRPM | SYSTOLIC BLOOD PRESSURE: 135 MMHG | WEIGHT: 240 LBS | BODY MASS INDEX: 33.6 KG/M2

## 2025-05-12 DIAGNOSIS — R10.84 GENERALIZED ABDOMINAL PAIN: Primary | ICD-10-CM

## 2025-05-12 LAB
ALBUMIN SERPL BCP-MCNC: 4.5 G/DL (ref 3.4–5)
ALP SERPL-CCNC: 63 U/L (ref 33–136)
ALT SERPL W P-5'-P-CCNC: 26 U/L (ref 10–52)
ANION GAP SERPL CALC-SCNC: 12 MMOL/L (ref 10–20)
APPEARANCE UR: CLEAR
AST SERPL W P-5'-P-CCNC: 24 U/L (ref 9–39)
ATRIAL RATE: 65 BPM
BASOPHILS # BLD AUTO: 0.03 X10*3/UL (ref 0–0.1)
BASOPHILS NFR BLD AUTO: 0.4 %
BILIRUB SERPL-MCNC: 0.7 MG/DL (ref 0–1.2)
BILIRUB UR STRIP.AUTO-MCNC: NEGATIVE MG/DL
BUN SERPL-MCNC: 20 MG/DL (ref 6–23)
CALCIUM SERPL-MCNC: 9.3 MG/DL (ref 8.6–10.3)
CARDIAC TROPONIN I PNL SERPL HS: 10 NG/L (ref 0–20)
CHLORIDE SERPL-SCNC: 106 MMOL/L (ref 98–107)
CO2 SERPL-SCNC: 26 MMOL/L (ref 21–32)
COLOR UR: YELLOW
CREAT SERPL-MCNC: 1.11 MG/DL (ref 0.5–1.3)
EGFRCR SERPLBLD CKD-EPI 2021: 69 ML/MIN/1.73M*2
EOSINOPHIL # BLD AUTO: 0.09 X10*3/UL (ref 0–0.4)
EOSINOPHIL NFR BLD AUTO: 1.3 %
ERYTHROCYTE [DISTWIDTH] IN BLOOD BY AUTOMATED COUNT: 13 % (ref 11.5–14.5)
GLUCOSE SERPL-MCNC: 116 MG/DL (ref 74–99)
GLUCOSE UR STRIP.AUTO-MCNC: NORMAL MG/DL
HCT VFR BLD AUTO: 48.5 % (ref 41–52)
HGB BLD-MCNC: 16.8 G/DL (ref 13.5–17.5)
HOLD SPECIMEN: 293
HOLD SPECIMEN: NORMAL
IMM GRANULOCYTES # BLD AUTO: 0.01 X10*3/UL (ref 0–0.5)
IMM GRANULOCYTES NFR BLD AUTO: 0.1 % (ref 0–0.9)
KETONES UR STRIP.AUTO-MCNC: NEGATIVE MG/DL
LACTATE SERPL-SCNC: 1.8 MMOL/L (ref 0.4–2)
LEUKOCYTE ESTERASE UR QL STRIP.AUTO: ABNORMAL
LIPASE SERPL-CCNC: 49 U/L (ref 9–82)
LYMPHOCYTES # BLD AUTO: 2.07 X10*3/UL (ref 0.8–3)
LYMPHOCYTES NFR BLD AUTO: 29.7 %
MAGNESIUM SERPL-MCNC: 2.35 MG/DL (ref 1.6–2.4)
MCH RBC QN AUTO: 32.4 PG (ref 26–34)
MCHC RBC AUTO-ENTMCNC: 34.6 G/DL (ref 32–36)
MCV RBC AUTO: 94 FL (ref 80–100)
MONOCYTES # BLD AUTO: 0.54 X10*3/UL (ref 0.05–0.8)
MONOCYTES NFR BLD AUTO: 7.8 %
MUCOUS THREADS #/AREA URNS AUTO: ABNORMAL /LPF
NEUTROPHILS # BLD AUTO: 4.22 X10*3/UL (ref 1.6–5.5)
NEUTROPHILS NFR BLD AUTO: 60.7 %
NITRITE UR QL STRIP.AUTO: NEGATIVE
NRBC BLD-RTO: 0 /100 WBCS (ref 0–0)
P AXIS: 85 DEGREES
P OFFSET: 166 MS
P ONSET: 133 MS
PH UR STRIP.AUTO: 5.5 [PH]
PLATELET # BLD AUTO: 161 X10*3/UL (ref 150–450)
POTASSIUM SERPL-SCNC: 4.2 MMOL/L (ref 3.5–5.3)
PR INTERVAL: 174 MS
PROT SERPL-MCNC: 7.1 G/DL (ref 6.4–8.2)
PROT UR STRIP.AUTO-MCNC: NEGATIVE MG/DL
Q ONSET: 220 MS
QRS COUNT: 10 BEATS
QRS DURATION: 82 MS
QT INTERVAL: 372 MS
QTC CALCULATION(BAZETT): 386 MS
QTC FREDERICIA: 381 MS
R AXIS: 14 DEGREES
RBC # BLD AUTO: 5.18 X10*6/UL (ref 4.5–5.9)
RBC # UR STRIP.AUTO: NEGATIVE MG/DL
RBC #/AREA URNS AUTO: ABNORMAL /HPF
SODIUM SERPL-SCNC: 140 MMOL/L (ref 136–145)
SP GR UR STRIP.AUTO: 1.02
T AXIS: 19 DEGREES
T OFFSET: 406 MS
UROBILINOGEN UR STRIP.AUTO-MCNC: ABNORMAL MG/DL
VENTRICULAR RATE: 65 BPM
WBC # BLD AUTO: 7 X10*3/UL (ref 4.4–11.3)
WBC #/AREA URNS AUTO: ABNORMAL /HPF
WBC CLUMPS #/AREA URNS AUTO: ABNORMAL /HPF

## 2025-05-12 PROCEDURE — 83605 ASSAY OF LACTIC ACID: CPT | Performed by: PHYSICIAN ASSISTANT

## 2025-05-12 PROCEDURE — 99285 EMERGENCY DEPT VISIT HI MDM: CPT | Mod: 25 | Performed by: STUDENT IN AN ORGANIZED HEALTH CARE EDUCATION/TRAINING PROGRAM

## 2025-05-12 PROCEDURE — 2500000004 HC RX 250 GENERAL PHARMACY W/ HCPCS (ALT 636 FOR OP/ED): Mod: JZ | Performed by: PHYSICIAN ASSISTANT

## 2025-05-12 PROCEDURE — 83735 ASSAY OF MAGNESIUM: CPT | Performed by: STUDENT IN AN ORGANIZED HEALTH CARE EDUCATION/TRAINING PROGRAM

## 2025-05-12 PROCEDURE — 2550000001 HC RX 255 CONTRASTS: Performed by: STUDENT IN AN ORGANIZED HEALTH CARE EDUCATION/TRAINING PROGRAM

## 2025-05-12 PROCEDURE — 84484 ASSAY OF TROPONIN QUANT: CPT | Performed by: PHYSICIAN ASSISTANT

## 2025-05-12 PROCEDURE — 74177 CT ABD & PELVIS W/CONTRAST: CPT | Performed by: RADIOLOGY

## 2025-05-12 PROCEDURE — 87086 URINE CULTURE/COLONY COUNT: CPT | Mod: STJLAB | Performed by: PHYSICIAN ASSISTANT

## 2025-05-12 PROCEDURE — 85025 COMPLETE CBC W/AUTO DIFF WBC: CPT | Performed by: PHYSICIAN ASSISTANT

## 2025-05-12 PROCEDURE — 96374 THER/PROPH/DIAG INJ IV PUSH: CPT | Mod: 59

## 2025-05-12 PROCEDURE — 2500000001 HC RX 250 WO HCPCS SELF ADMINISTERED DRUGS (ALT 637 FOR MEDICARE OP): Performed by: PHYSICIAN ASSISTANT

## 2025-05-12 PROCEDURE — 81001 URINALYSIS AUTO W/SCOPE: CPT | Performed by: PHYSICIAN ASSISTANT

## 2025-05-12 PROCEDURE — 36415 COLL VENOUS BLD VENIPUNCTURE: CPT | Performed by: PHYSICIAN ASSISTANT

## 2025-05-12 PROCEDURE — 96375 TX/PRO/DX INJ NEW DRUG ADDON: CPT

## 2025-05-12 PROCEDURE — 83690 ASSAY OF LIPASE: CPT | Performed by: PHYSICIAN ASSISTANT

## 2025-05-12 PROCEDURE — 80053 COMPREHEN METABOLIC PANEL: CPT | Performed by: PHYSICIAN ASSISTANT

## 2025-05-12 PROCEDURE — 93005 ELECTROCARDIOGRAM TRACING: CPT

## 2025-05-12 PROCEDURE — 74177 CT ABD & PELVIS W/CONTRAST: CPT

## 2025-05-12 RX ORDER — DICYCLOMINE HYDROCHLORIDE 20 MG/1
20 TABLET ORAL 4 TIMES DAILY PRN
Qty: 20 TABLET | Refills: 0 | Status: SHIPPED | OUTPATIENT
Start: 2025-05-12 | End: 2025-05-22

## 2025-05-12 RX ORDER — LISINOPRIL 10 MG/1
10 TABLET ORAL ONCE
Status: COMPLETED | OUTPATIENT
Start: 2025-05-12 | End: 2025-05-12

## 2025-05-12 RX ORDER — ONDANSETRON HYDROCHLORIDE 2 MG/ML
4 INJECTION, SOLUTION INTRAVENOUS ONCE
Status: COMPLETED | OUTPATIENT
Start: 2025-05-12 | End: 2025-05-12

## 2025-05-12 RX ORDER — MORPHINE SULFATE 4 MG/ML
4 INJECTION, SOLUTION INTRAMUSCULAR; INTRAVENOUS ONCE
Status: COMPLETED | OUTPATIENT
Start: 2025-05-12 | End: 2025-05-12

## 2025-05-12 RX ADMIN — LISINOPRIL 10 MG: 10 TABLET ORAL at 11:01

## 2025-05-12 RX ADMIN — MORPHINE SULFATE 4 MG: 4 INJECTION, SOLUTION INTRAMUSCULAR; INTRAVENOUS at 09:43

## 2025-05-12 RX ADMIN — IOHEXOL 75 ML: 350 INJECTION, SOLUTION INTRAVENOUS at 10:21

## 2025-05-12 RX ADMIN — ONDANSETRON 4 MG: 2 INJECTION INTRAMUSCULAR; INTRAVENOUS at 09:42

## 2025-05-12 ASSESSMENT — PAIN SCALES - GENERAL
PAINLEVEL_OUTOF10: 8
PAINLEVEL_OUTOF10: 3

## 2025-05-12 ASSESSMENT — LIFESTYLE VARIABLES
HAVE PEOPLE ANNOYED YOU BY CRITICIZING YOUR DRINKING: NO
HAVE YOU EVER FELT YOU SHOULD CUT DOWN ON YOUR DRINKING: NO
TOTAL SCORE: 0
EVER HAD A DRINK FIRST THING IN THE MORNING TO STEADY YOUR NERVES TO GET RID OF A HANGOVER: NO
EVER FELT BAD OR GUILTY ABOUT YOUR DRINKING: NO

## 2025-05-12 ASSESSMENT — PAIN DESCRIPTION - LOCATION
LOCATION: ABDOMEN
LOCATION: ABDOMEN

## 2025-05-12 ASSESSMENT — PAIN - FUNCTIONAL ASSESSMENT
PAIN_FUNCTIONAL_ASSESSMENT: 0-10
PAIN_FUNCTIONAL_ASSESSMENT: 0-10

## 2025-05-12 ASSESSMENT — PAIN DESCRIPTION - PAIN TYPE
TYPE: ACUTE PAIN
TYPE: ACUTE PAIN

## 2025-05-12 ASSESSMENT — PAIN DESCRIPTION - ORIENTATION: ORIENTATION: RIGHT;LEFT;UPPER

## 2025-05-12 ASSESSMENT — PAIN DESCRIPTION - DESCRIPTORS: DESCRIPTORS: ACHING

## 2025-05-12 NOTE — ED PROVIDER NOTES
"Emergency Department Encounter  [facility] [place] EMERGENCY MEDICINE    Patient: Jovanny Yap  MRN: [id number]  : 1950  Date of Evaluation: 2025  ED Provider: Yovana Sung PA-C        History of Present Illness     This is a 75-year-old male with past medical history of hypertension, hyperlipidemia, BPH, GERD, previous right hemicolectomy secondary to colonic perforation from colonoscopy, incisional hernia status post repair as well as multiple SBO's who presents to the ED with abdominal pain that began this morning.  He states the pain woke him up from sleep around 7 to 8 AM.  He describes a dull aching pain that is constant in nature.  He is unsure if this feels like his previous bowel obstructions.  He states that he did have a normal bowel movement this morning.  He endorses chronic constipation but was able to have a small bowel movement this morning.  Endorses being able to pass gas.  Endorses nausea without vomiting.  Denies diarrhea.  Denies blood in his stool.  Denies dark tarry stool.  Denies urinary symptoms.  Denies fevers or chills, chest pain, shortness, or other complaints at this time.  Patient was found to be hypertensive on arrival.  He states that he had not yet taken his morning medications for his blood pressure.  States that he did try taking Tums at home without relief of his symptoms.      History provided by:  Patient   used: No             Visit Vitals  /75   Pulse 67   Temp 36 °C (96.8 °F) (Temporal)   Resp 15   Ht 1.803 m (5' 11\")   Wt 109 kg (240 lb)   SpO2 96%   BMI 33.47 kg/m²   Smoking Status Never   BSA 2.34 m²          Physical Exam       Triage vitals:  T 36 °C (96.8 °F)  HR 67  BP (!) 174/108  RR 20  O2 98 % None (Room air)    Physical Exam     Physical exam:   General: Vitals noted, no distress. Afebrile.   EENT:  Hearing grossly intact. Normal phonation. MMM. Airway patient. PERRL. EOMI.   Neck: No midline tenderness or " paraspinal tenderness. FROM.   Cardiac: Regular, rate, rhythm. Normal S1 and S2.  No murmurs, gallops, rubs.   Pulmonary: Good air exchange. Lungs clear bilaterally. No wheezes, rhonchi, rales. No accessory muscle use.   Abdomen: Mildly distended, tender to palpation to the right upper quadrant, epigastric region, and right lower quadrant.  No rebound or guarding.  No peritoneal signs.   Back: No CVA tenderness. No midline tenderness or paraspinal tenderness. No obvious deformity or step off.   Extremities: No peripheral edema.  Full range of motion. Moves all extremities freely. No tenderness throughout extremities.   Skin: No rash. Warm and Dry.   Neuro: No focal neurologic deficits. CN 2-12 grossly intact. Sensation equal bilaterally. No weakness.       Results       Labs Reviewed   COMPREHENSIVE METABOLIC PANEL - Abnormal       Result Value    Glucose 116 (*)     Sodium 140      Potassium 4.2      Chloride 106      Bicarbonate 26      Anion Gap 12      Urea Nitrogen 20      Creatinine 1.11      eGFR 69      Calcium 9.3      Albumin 4.5      Alkaline Phosphatase 63      Total Protein 7.1      AST 24      Bilirubin, Total 0.7      ALT 26     URINALYSIS WITH REFLEX CULTURE AND MICROSCOPIC - Abnormal    Color, Urine Yellow      Appearance, Urine Clear      Specific Gravity, Urine 1.023      pH, Urine 5.5      Protein, Urine NEGATIVE      Glucose, Urine Normal      Blood, Urine NEGATIVE      Ketones, Urine NEGATIVE      Bilirubin, Urine NEGATIVE      Urobilinogen, Urine 2 (1+) (*)     Nitrite, Urine NEGATIVE      Leukocyte Esterase, Urine 25 Spenser/uL (*)    MICROSCOPIC ONLY, URINE - Abnormal    WBC, Urine 6-10 (*)     WBC Clumps, Urine RARE      RBC, Urine 1-2      Mucus, Urine FEW     LIPASE - Normal    Lipase 49      Narrative:     Venipuncture immediately after or during the administration of Metamizole may lead to falsely low results. Testing should be performed immediately prior to Metamizole dosing.   LACTATE -  Normal    Lactate 1.8      Narrative:     Venipuncture immediately after or during the administration of Metamizole may lead to falsely low results. Testing should be performed immediately prior to Metamizole dosing.   TROPONIN I, HIGH SENSITIVITY - Normal    Troponin I, High Sensitivity 10      Narrative:     Less than 99th percentile of normal range cutoff-  Female and children under 18 years old <14 ng/L; Male <21 ng/L: Negative  Repeat testing should be performed if clinically indicated.     Female and children under 18 years old 14-50 ng/L; Male 21-50 ng/L:  Consistent with possible cardiac damage and possible increased clinical   risk. Serial measurements may help to assess extent of myocardial damage.     >50 ng/L: Consistent with cardiac damage, increased clinical risk and  myocardial infarction. Serial measurements may help assess extent of   myocardial damage.      NOTE: Children less than 1 year old may have higher baseline troponin   levels and results should be interpreted in conjunction with the overall   clinical context.     NOTE: Troponin I testing is performed using a different   testing methodology at [facility] than at other   [facility]. Direct result comparisons should only   be made within the same method.   MAGNESIUM - Normal    Magnesium 2.35     URINE CULTURE   CBC WITH AUTO DIFFERENTIAL    WBC 7.0      nRBC 0.0      RBC 5.18      Hemoglobin 16.8      Hematocrit 48.5      MCV 94      MCH 32.4      MCHC 34.6      RDW 13.0      Platelets 161      Neutrophils % 60.7      Immature Granulocytes %, Automated 0.1      Lymphocytes % 29.7      Monocytes % 7.8      Eosinophils % 1.3      Basophils % 0.4      Neutrophils Absolute 4.22      Immature Granulocytes Absolute, Automated 0.01      Lymphocytes Absolute 2.07      Monocytes Absolute 0.54      Eosinophils Absolute 0.09      Basophils Absolute 0.03     URINALYSIS WITH REFLEX CULTURE AND MICROSCOPIC    Narrative:     The  following orders were created for panel order Urinalysis with Reflex Culture and Microscopic.  Procedure                               Abnormality         Status                     ---------                               -----------         ------                     Urinalysis with Reflex C...[[id number]]  Abnormal            Final result               Extra Urine Gray Tube[[id number]]                            In process                   Please view results for these tests on the individual orders.   EXTRA URINE GRAY TUBE       CT abdomen pelvis w IV contrast   Final Result   1. No acute process within the abdomen or pelvis.   2. Mild fatty infiltration of the liver.   3. Bilateral renal cysts.             Signed by: Abhilash Sellers 5/12/2025 10:37 AM   Dictation workstation:   [id number]            Medical Decision Making & ED Course         ED Course & MDM     Medical Decision Making  This is a 75-year-old male with past medical history of hypertension, hyperlipidemia, recurrent SBO's who presents to the ED with diffuse right-sided abdominal pain that began this morning with associated nausea.  Vitals that show he was hypertensive at 174/108.  He denied headache or chest pain at this time.  Patient neurologically intact without deficits.  He had not yet taken his morning medications for his blood pressure.  Vitals otherwise grossly unremarkable.  On examination patient tender to palpation of the right upper quadrant, right lower quadrant epigastric region.  No rebound or guarding.  Abdomen mildly distended.  No CVA tenderness.  IV established laboratory studies obtained.  Troponin and EKG ordered due to his upper abdominal pain.  UA ordered.  Patient ordered Zofran and morphine for his symptoms.  CT abdomen pelvis ordered.  On my reassessment he was feeling improved.  Patient's laboratory studies showed normal troponin at 10.  Normal lipase at 49.  CMP within normal limits, specifically normal LFTs.  Magnesium  normal at 2.35.  Lactate normal at 1.8.  CBC within normal limits.  Very low suspicion for ACS based on patient's unremarkable EKG as well as troponin and description of his symptoms today.  CT abdomen pelvis did show the patient's known bilateral renal cyst.  He is being worked up for this as an outpatient and has an appointment for this tomorrow.  CT scan showed no acute abdominal pelvic process at this time, specifically no evidence of SBO.  These results were discussed with the patient.  At this time he was feeling improved.  He was able to tolerate p.o. intake without difficulty.  Patient was written a prescription for Bentyl as needed at home.  He was advised to follow-up with his primary care provider as needed for his abdominal pain.  He was given signs and symptoms that he should return to the emergency department with.  He was agreeable to this plan.  He had no further questions at this time and was discharged from the ED in stable condition.         ED Course as of 05/12/25 1121   Mon May 12, 2025   0949 EKG 0941: 65 bpm.  Normal sinus rhythm.  Left axis, normal intervals, no acute ST elevations or depressions, nonischemic pattern at this time.  Unable to compare to previous in chart [KE]   1118 Tolerated PO trial. Pain has improved. BP improved [KE]   1118 BP: 149/75 [KE]      ED Course User Index  [KE] Devonte Jasso, DO         Diagnoses as of 05/12/25 1121   Generalized abdominal pain              EKG Independent Interpretation: EKG interpreted by myself. Please see ED Course for full interpretation.    Independent Result Review and Interpretation: CT abdomen pelvis without visualized SBO          Disposition   As a result of the work-up, the patient was discharged home.  he was informed of his diagnosis and instructed to come back with any concerns or worsening of condition.  he and was agreeable to the plan as discussed above.  he was given the opportunity to ask questions.  All of the patient's  questions were answered.    Procedures     This was a shared visit with an ED attending.  The patient was seen and discussed with the ED attending    Procedures    Yovana Sung PA-C  Emergency Medicine      Yovana Sung PA-C  05/12/25 1128

## 2025-05-13 ENCOUNTER — HOSPITAL ENCOUNTER (OUTPATIENT)
Dept: RADIOLOGY | Facility: HOSPITAL | Age: 75
Discharge: HOME | End: 2025-05-13
Payer: MEDICARE

## 2025-05-13 VITALS
HEART RATE: 68 BPM | RESPIRATION RATE: 20 BRPM | DIASTOLIC BLOOD PRESSURE: 81 MMHG | WEIGHT: 240 LBS | OXYGEN SATURATION: 96 % | TEMPERATURE: 98.6 F | SYSTOLIC BLOOD PRESSURE: 169 MMHG | BODY MASS INDEX: 33.6 KG/M2 | HEIGHT: 71 IN

## 2025-05-13 DIAGNOSIS — D30.00 RENAL ONCOCYTOMA, UNSPECIFIED LATERALITY: ICD-10-CM

## 2025-05-13 LAB — BACTERIA UR CULT: NORMAL

## 2025-05-13 PROCEDURE — 99204 OFFICE O/P NEW MOD 45 MIN: CPT | Performed by: RADIOLOGY

## 2025-05-13 ASSESSMENT — ENCOUNTER SYMPTOMS
EYES NEGATIVE: 1
NEUROLOGICAL NEGATIVE: 1
CONSTITUTIONAL NEGATIVE: 1
CARDIOVASCULAR NEGATIVE: 1
BACK PAIN: 1
GASTROINTESTINAL NEGATIVE: 1
RESPIRATORY NEGATIVE: 1
PSYCHIATRIC NEGATIVE: 1
HEMATOLOGIC/LYMPHATIC NEGATIVE: 1

## 2025-05-13 ASSESSMENT — PAIN - FUNCTIONAL ASSESSMENT: PAIN_FUNCTIONAL_ASSESSMENT: 0-10

## 2025-05-13 ASSESSMENT — PAIN SCALES - GENERAL: PAINLEVEL_OUTOF10: 3

## 2025-05-13 NOTE — H&P
History Of Present Illness  Jovanny Yap is a 75 y.o. male presenting with a history of solid left kidney mass. He is a patient of Dr. Ziegler, who has been following the mass. 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 There has been interval growth of the mass, now up to 3.6 cm in greatest diameter. Given the interval growth and the possibility of sampling errors at needle biopsy, Dr. Ziegler wanted to pursue treatment of the mass. This patient has had multiple abdominal surgeries with adhesions and mesh. Given this, he is referred for evaluation of cryoablation. Patient with no relevant symptoms related to the kidney mass. No hematuria, no flank mass.     Past Medical History  Medical History[1]    Surgical History  Surgical History[2]     Social History  He reports that he has never smoked. He has never used smokeless tobacco. He reports that he does not currently use alcohol. He reports that he does not use drugs.    Family History  Family History[3]     Allergies  Patient has no known allergies.    Review of Systems   Constitutional: Negative.    HENT: Negative.     Eyes: Negative.    Respiratory: Negative.     Cardiovascular: Negative.    Gastrointestinal: Negative.    Genitourinary: Negative.    Musculoskeletal:  Positive for back pain.   Neurological: Negative.    Hematological: Negative.    Psychiatric/Behavioral: Negative.          Physical Exam  Vitals reviewed.   Constitutional:       Appearance: Normal appearance.   HENT:      Head: Normocephalic and atraumatic.   Cardiovascular:      Rate and Rhythm: Normal rate and regular rhythm.   Pulmonary:      Effort: Pulmonary effort is normal. No respiratory distress.      Breath sounds: Normal breath sounds. No wheezing or rales.   Neurological:      Mental Status: He is alert.          Last Recorded Vitals  Blood pressure 169/81, pulse 68, temperature 37 °C (98.6 °F), temperature source Temporal, resp. rate  "20, height 1.803 m (5' 11\"), weight 109 kg (240 lb), SpO2 96%.    Relevant Results    I reviewed multiple recent cross sectional imaging exams, including the 5/2/25 exam showing the endophytic mass in the left mid to lower pole.     Latest Reference Range & Units 05/12/25 09:29   GLUCOSE 74 - 99 mg/dL 116 (H)   SODIUM 136 - 145 mmol/L 140   POTASSIUM 3.5 - 5.3 mmol/L 4.2   CHLORIDE 98 - 107 mmol/L 106   Bicarbonate 21 - 32 mmol/L 26   Anion Gap 10 - 20 mmol/L 12   Blood Urea Nitrogen 6 - 23 mg/dL 20   Creatinine 0.50 - 1.30 mg/dL 1.11   EGFR >60 mL/min/1.73m*2 69   Calcium 8.6 - 10.3 mg/dL 9.3   Albumin 3.4 - 5.0 g/dL 4.5   Alkaline Phosphatase 33 - 136 U/L 63   ALT 10 - 52 U/L 26   AST 9 - 39 U/L 24   Bilirubin Total 0.0 - 1.2 mg/dL 0.7   Total Protein 6.4 - 8.2 g/dL 7.1   MAGNESIUM 1.60 - 2.40 mg/dL 2.35   Lactate 0.4 - 2.0 mmol/L 1.8   LIPASE 9 - 82 U/L 49   WBC 4.4 - 11.3 x10*3/uL 7.0   nRBC 0.0 - 0.0 /100 WBCs 0.0   RBC 4.50 - 5.90 x10*6/uL 5.18   HEMOGLOBIN 13.5 - 17.5 g/dL 16.8   HEMATOCRIT 41.0 - 52.0 % 48.5   MCV 80 - 100 fL 94   MCH 26.0 - 34.0 pg 32.4   MCHC 32.0 - 36.0 g/dL 34.6   RED CELL DISTRIBUTION WIDTH 11.5 - 14.5 % 13.0   Platelets 150 - 450 x10*3/uL 161   Neutrophils % 40.0 - 80.0 % 60.7   Immature Granulocytes %, Automated 0.0 - 0.9 % 0.1   Lymphocytes % 13.0 - 44.0 % 29.7   Monocytes % 2.0 - 10.0 % 7.8   Eosinophils % 0.0 - 6.0 % 1.3   Basophils % 0.0 - 2.0 % 0.4   Neutrophils Absolute 1.60 - 5.50 x10*3/uL 4.22   Immature Granulocytes Absolute, Automated 0.00 - 0.50 x10*3/uL 0.01   Lymphocytes Absolute 0.80 - 3.00 x10*3/uL 2.07   Monocytes Absolute 0.05 - 0.80 x10*3/uL 0.54   Eosinophils Absolute 0.00 - 0.40 x10*3/uL 0.09   Basophils Absolute 0.00 - 0.10 x10*3/uL 0.03   (H): Data is abnormally high     Assessment & Plan  Renal oncocytoma, unspecified laterality      I had a discussion with the patient regarding the nature of image guided cryoablation and how it may be used to treat patients " with small kidney tumors. I discussed the likely outcomes of the procedure as well as more remote risks. These include but are not limited to the risk of bleeding, including hematuria, which could require a blood transfusion, inpatient (including ICU) admission, or additional procedures or surgeries for correction. There is a risk of kidney damage related to potential bleeding and from damage to the collecting system which could impair function or lead to nephrectomy or the remote risk of death. We discussed the possibility of damage to adjacent structures.     DI discussed the need for clinical and imaging follow up with the urologist to evaluate for residual or recurrent disease which may require additional procedures or surgeries for complete treatment.     We discussed the expected post procedure course and the plan for discharge. The patient demonstrates an understanding of the procedure and its risks and benefits, asking appropriate questions.     We will proceed with image guided ablation of the left kidney mass under general anesthesia.           I spent 60 minutes in the professional and overall care of this patient.      Chaitanay Daniels MD         [1]   Past Medical History:  Diagnosis Date    MAT (acute kidney injury)     Anxiety     BPH (benign prostatic hyperplasia)     taking tamsulosin    Colon polyp     DVT (deep venous thrombosis) (Multi) 08/03/2023    provoked after 8H road trip    Fatty liver     GERD (gastroesophageal reflux disease)     F/W PCP- taking protonix    HL (hearing loss)     b/l    Hyperlipidemia     F/W cards- taking simvastatin    Hypertension     F/W cards- takes lisinopril    Incisional hernia     Renal cyst     Renal lesion 04/2023    s/p biopsy    Small bowel obstruction (Multi) 08/21/2023    Vision loss     wears glasses   [2]   Past Surgical History:  Procedure Laterality Date    COLONOSCOPY  01/10/2024    EYE SURGERY      Eye Surgery    HEMICOLECTOMY Right     RENAL  BIOPSY  04/2023    TONSILLECTOMY     [3]   Family History  Problem Relation Name Age of Onset    Stroke Mother      Coronary artery disease Father

## 2025-05-14 ENCOUNTER — PREP FOR PROCEDURE (OUTPATIENT)
Dept: RADIOLOGY | Facility: HOSPITAL | Age: 75
End: 2025-05-14
Payer: MEDICARE

## 2025-05-14 DIAGNOSIS — D30.00 RENAL ONCOCYTOMA, UNSPECIFIED LATERALITY: Primary | ICD-10-CM

## 2025-05-19 LAB
ATRIAL RATE: 65 BPM
P AXIS: 85 DEGREES
P OFFSET: 166 MS
P ONSET: 133 MS
PR INTERVAL: 174 MS
Q ONSET: 220 MS
QRS COUNT: 10 BEATS
QRS DURATION: 82 MS
QT INTERVAL: 372 MS
QTC CALCULATION(BAZETT): 386 MS
QTC FREDERICIA: 381 MS
R AXIS: 14 DEGREES
T AXIS: 19 DEGREES
T OFFSET: 406 MS
VENTRICULAR RATE: 65 BPM

## 2025-06-05 ENCOUNTER — PRE-ADMISSION TESTING (OUTPATIENT)
Dept: PREADMISSION TESTING | Facility: HOSPITAL | Age: 75
End: 2025-06-05
Payer: MEDICARE

## 2025-06-05 VITALS
SYSTOLIC BLOOD PRESSURE: 137 MMHG | TEMPERATURE: 96.4 F | BODY MASS INDEX: 35.77 KG/M2 | RESPIRATION RATE: 15 BRPM | HEART RATE: 62 BPM | OXYGEN SATURATION: 97 % | HEIGHT: 71 IN | WEIGHT: 255.51 LBS | DIASTOLIC BLOOD PRESSURE: 65 MMHG

## 2025-06-05 DIAGNOSIS — Z01.818 PREOP EXAMINATION: Primary | ICD-10-CM

## 2025-06-05 PROCEDURE — 99202 OFFICE O/P NEW SF 15 MIN: CPT | Performed by: NURSE PRACTITIONER

## 2025-06-05 ASSESSMENT — DUKE ACTIVITY SCORE INDEX (DASI)
TOTAL_SCORE: 50.2
CAN YOU WALK A BLOCK OR TWO ON LEVEL GROUND: YES
CAN YOU RUN A SHORT DISTANCE: YES
CAN YOU DO HEAVY WORK AROUND THE HOUSE LIKE SCRUBBING FLOORS OR LIFTING AND MOVING HEAVY FURNITURE: NO
CAN YOU CLIMB A FLIGHT OF STAIRS OR WALK UP A HILL: YES
CAN YOU DO LIGHT WORK AROUND THE HOUSE LIKE DUSTING OR WASHING DISHES: YES
CAN YOU DO YARD WORK LIKE RAKING LEAVES, WEEDING OR PUSHING A MOWER: YES
CAN YOU TAKE CARE OF YOURSELF (EAT, DRESS, BATHE, OR USE TOILET): YES
CAN YOU PARTICIPATE IN MODERATE RECREATIONAL ACTIVITIES LIKE GOLF, BOWLING, DANCING, DOUBLES TENNIS OR THROWING A BASEBALL OR FOOTBALL: YES
DASI METS SCORE: 8.9
CAN YOU PARTICIPATE IN STRENOUS SPORTS LIKE SWIMMING, SINGLES TENNIS, FOOTBALL, BASKETBALL, OR SKIING: YES
CAN YOU DO MODERATE WORK AROUND THE HOUSE LIKE VACUUMING, SWEEPING FLOORS OR CARRYING GROCERIES: YES
CAN YOU WALK INDOORS, SUCH AS AROUND YOUR HOUSE: YES
CAN YOU HAVE SEXUAL RELATIONS: YES

## 2025-06-05 ASSESSMENT — ACTIVITIES OF DAILY LIVING (ADL): ADL_SCORE: 0

## 2025-06-05 ASSESSMENT — LIFESTYLE VARIABLES: SMOKING_STATUS: NONSMOKER

## 2025-06-05 NOTE — PREPROCEDURE INSTRUCTIONS
Medication List            Accurate as of June 5, 2025  1:27 PM. Always use your most recent med list.                acetaminophen 325 mg tablet  Commonly known as: Tylenol  Take 2 tablets (650 mg) by mouth every 6 hours if needed for mild pain (1 - 3) or moderate pain (4 - 6).  Medication Adjustments for Surgery: Take/Use as prescribed  Additional Medication Adjustments for Surgery: Other (Comment)  Notes to patient: Tylenol (Acetaminophen) is okay to take up until the morning of surgery for pain or headache as needed      aspirin 81 mg EC tablet  Additional Medication Adjustments for Surgery: Take last dose 7 days before surgery     cyanocobalamin 1,000 mcg tablet  Commonly known as: Vitamin B-12     escitalopram 5 mg tablet  Commonly known as: Lexapro  Take 1 tablet (5 mg) by mouth once daily.  Medication Adjustments for Surgery: Take/Use as prescribed     lisinopril 10 mg tablet  Take 1 tablet (10 mg) by mouth once daily.  Additional Medication Adjustments for Surgery: Other (Comment)  Notes to patient: HOLD any evening dose the night before the day of surgery  HOLD the day of surgery     pantoprazole 40 mg EC tablet  Commonly known as: ProtoNix  Take 1 tablet (40 mg) by mouth once daily.  Medication Adjustments for Surgery: Take/Use as prescribed     simvastatin 20 mg tablet  Commonly known as: Zocor  Take 1 tablet (20 mg) by mouth once daily in the evening.  Medication Adjustments for Surgery: Take/Use as prescribed     tamsulosin 0.4 mg 24 hr capsule  Commonly known as: Flomax  Take 1 capsule (0.4 mg) by mouth once daily.  Medication Adjustments for Surgery: Take/Use as prescribed                                PRE-OPERATIVE INSTRUCTIONS    You will receive notification one business day prior to your procedure to confirm your arrival time. It is important that you answer your phone and/or check your messages during this time. If you do not hear from the surgery center by 5 pm. the day before your  procedure, please call [phone].     Please enter the building through the Outpatient entrance and take the elevator off the lobby to the 2nd floor then check in at the Outpatient Surgery desk on the 2nd floor.    INSTRUCTIONS:  Talk to your surgeon for instructions if you should stop your aspirin, blood thinner, or diabetes medicines.  DO NOT take any multivitamins or over the counter supplements for 7-10 days before surgery.  If not being admitted, you must have an adult immediately available to drive you home after surgery. We also highly recommend you have someone stay with you for the entire day and night of your surgery.  For children having surgery, a parent or legal guardian must accompany them to the surgery center. If this is not possible, please call [phone] to make additional arrangements.  For adults who are unable to consent or make medical decisions for themselves, a legal guardian or Power of  must accompany them to the surgery center. If this is not possible, please call [phone] to make additional arrangements.  Wear comfortable, loose fitting clothing.  All jewelry and piercings must be removed. If you are unable to remove an item or have a dermal piercing, please be sure to tell the nurse when you arrive for surgery.  Nail polish and make-up must be removed.  Avoid smoking or consuming alcohol for 24 hours before surgery.  To help prevent infection, please take a shower/bath and wash your hair the night before and/or morning of surgery (or follow other specific bathing instructions provided).    Preoperative Fasting Guidelines    Why must I stop eating and drinking near surgery time?  With sedation, food or liquid in your stomach can enter your lungs causing serious complications  Increases nausea and vomiting    When do I need to stop eating and drinking before my surgery?  Do not eat any solid food after midnight the night before your surgery/procedure unless otherwise  instructed by your surgeon.   If you take a GLP-1 medication (ex: Trulicity, Ozempic, Mounjaro, Zepbound, Bydyreon, Tanzeun, Saxenda, Victoza, Adlyxin, Rybelus) please follow other specific instructions provided regarding preoperative          fasting.  You may have up to 13.5 ounces of clear liquid until TWO hours before your instructed arrival time to the hospital.  This includes water, black tea/coffee, (no milk or cream) apple juice, and electrolyte drinks (Gatorade).   You may chew gum until TWO hours before your surgery/procedure         If applicable, notify your surgeons office immediately of any new skin changes that occur to the surgical limb.      If you have any questions or concerns, please call Pre-Admission Testing at [phone].                       Home Preoperative Antibacterial Shower with Chlorhexidine gluconate (CHG)     What is a home preoperative antibacterial shower?  This shower is a way of cleaning the skin with a germ killing solution before surgery. The solution contains chlorhexidine gluconate, commonly known as CHG. CHG is a skin cleanser with germ killing ability. Let your doctor know if you are allergic to chlorhexidine.    Why do I need to take a preoperative antibacterial shower?  Skin is not sterile. It is best to try to make your skin as free of germs as possible before surgery. Proper cleansing with a germ killing soap before surgery can lower the number of germs on your skin. This helps to reduce the risk of infection at the surgical site. Following the instructions listed below will help you prepare your skin for surgery.    How do I use the solution?  Begin using your CHG soap the night before and again the morning of your procedure.   Do not shave the day before or day of surgery.  Remove all jewelry until after surgery. Take off rings and take out all body-piercing jewelry.  Wash your face and hair with normal soap and shampoo before you use the CHG soap.  Apply the CHG  solution to a clean wet washcloth. Move away from the water to avoid premature rinsing of the CHG soap as you are applying. Firmly lather your entire body from the neck down. Do not use CHG on your face, eyes, ears, or genitals.   Pay special attention to the area where your incisions will be located.  Do not scrub your skin too hard.  It is important to allow the CHG soap to sit on your skin for 3-5 minutes.  Rinse the solution off your body completely. Do not wash with your normal soap after the CHG soap solution.  Pat yourself dry with a clean, soft towel.  Do not apply powders, lotions or deodorants as these might block how the CHG soap works.   Dress in clean clothing.  Be sure to sleep with clean, freshly laundered sheets.  Be aware that CHG can cause stains on fabric. Rinse your washcloth and other linens that have contact with CHG completely. Use only non-chlorine detergents to launder the items used.

## 2025-06-05 NOTE — PREPROCEDURE INSTRUCTIONS
Thank you for visiting Preadmission Testing at [facility]. If you have any changes to your health condition, please call the SURGEON's office to alert them and give them details of your symptoms.        Preoperative Brain Exercises    What are brain exercises?  A brain exercise is any activity that engages your thinking (cognitive) skills.    What types of activities are considered brain exercises?  Jigsaw puzzles, crossword puzzles, word jumble, memory games, word search, and many more.  Many can be found free online or on your phone via a mobile paz.    Why should I do brain exercises before my surgery?  More recent research has shown brain exercise before surgery can lower the risk of postoperative delirium (confusion) which can be especially important for older adults.  Patients who did brain exercises for 5 to 10 hours the days before surgery, cut their risk of postoperative delirium in half up to 1 week after surgery.      Preoperative Deep Breathing Exercises    Why it is important to do deep breathing exercises before my surgery?  Deep breathing exercises strengthen your breathing muscles.  This helps you to recover after your surgery and decreases the chance of breathing complications.    How are the deep breathing exercises done?  Sit straight with your back supported.  Breathe in deeply and slowly through your nose. Your lower rib cage should expand and your abdomen may move forward.  Hold that breath for 3 to 5 seconds.  Breathe out through pursed lips, slowly and completely.  Rest and repeat 10 times every hour while awake.  Rest longer if you become dizzy or lightheaded.      Patient and Family Education   Ways You Can Help Prevent Blood Clots     This handout explains some simple things you can do to help prevent blood clots.      Blood clots are blockages that can form in the body's veins. When a blood clot forms in your deep veins, it may be called a deep vein thrombosis, or DVT for short. Blood clots can  happen in any part of the body where blood flows, but they are most common in the arms and legs. If a piece of a blood clot breaks free and travels to the lungs, it is called a pulmonary embolus (PE). A PE can be a very serious problem.      Being in the hospital or having surgery can raise your chances of getting a blood clot because you may not be well enough to move around as much as you normally do.      Ways you can help prevent blood clots in the hospital         Wearing SCDs. SCDs stands for Sequential Compression Devices.   SCDs are special sleeves that wrap around your legs  They attach to a pump that fills them with air to gently squeeze your legs every few minutes.   This helps return the blood in your legs to your heart.   SCDs should only be taken off when walking or bathing.   SCDs may not be comfortable, but they can help save your life.               Wearing compression stockings - if your doctor orders them. These special snug fitting stockings gently squeeze your legs to help blood flow.       Walking. Walking helps move the blood in your legs.   If your doctor says it is ok, try walking the halls at least   5 times a day. Ask us to help you get up, so you don't fall.      Taking any blood thinning medicines your doctor orders.          ©[facility]; 3/23        Ways you can help prevent blood clots at home       Wearing compression stockings - if your doctor orders them. ? Walking - to help move the blood in your legs.       Taking any blood thinning medicines your doctor orders.      Signs of a blood clot or PE      Tell your doctor or nurse know right away if you have of the problems listed below.    If you are at home, seek medical care right away. Call 911 for chest pain or problems breathing.          Signs of a blood clot (DVT) - such as pain,  swelling, redness or warmth in your arm or leg      Signs of a pulmonary embolism (PE) - such as chest     pain or feeling short of breath

## 2025-06-05 NOTE — CPM/PAT H&P
[facility]/PAT Evaluation       Name: Jovanny Yap (Jovanny Yap)  /Age: 1950/75 y.o.     In-Person       Chief Complaint: pre op appointment for upcoming surgery     HPI    DW is 75 old male who has had a known left kidney mass since .  He has been under surveillance and under most recent imaging the mass has increased in size.  Patient was referred to urologist who then referred patient to interventional radiologist.  Treatment options discussed and he has been subsequently scheduled for a left renal cryoablation and biopsy. Presents to [facility] today for perioperative risk stratification and optimization.  He is accompanied today by his wife.  He denies any recent dysuria, hematuria, or any known recent urinary tract infections.  Endorses occasional left-sided flank pains. Otherwise denies any recent illness, fever/chills, chest pains or shortness of breath.     Medical History[1]    Surgical History[2]    Patient  reports that he is not currently sexually active.    Family History[3]    Allergies[4]    Prior to Admission medications    Medication Sig Start Date End Date Taking? Authorizing Provider   acetaminophen (Tylenol) 325 mg tablet Take 2 tablets (650 mg) by mouth every 6 hours if needed for mild pain (1 - 3) or moderate pain (4 - 6). 3/26/24   JOURDAN Carrasco-CNP   aspirin 81 mg EC tablet Take 1 tablet (81 mg) by mouth once daily.    Historical Provider, MD   cyanocobalamin (Vitamin B-12) 1,000 mcg tablet Take 1 tablet (1,000 mcg) by mouth once daily at bedtime.    Historical Provider, MD   escitalopram (Lexapro) 5 mg tablet Take 1 tablet (5 mg) by mouth once daily. 25   Jhonny Cuevas DO   lisinopril 10 mg tablet Take 1 tablet (10 mg) by mouth once daily. 25   Abdiaziz Hood MD   pantoprazole (ProtoNix) 40 mg EC tablet Take 1 tablet (40 mg) by mouth once daily. 25   Jhonny Cuevas DO   simvastatin (Zocor) 20 mg tablet Take 1 tablet (20 mg) by mouth once daily in the evening.  2/17/25   Jhonny Cuevas DO   tamsulosin (Flomax) 0.4 mg 24 hr capsule Take 1 capsule (0.4 mg) by mouth once daily. 2/17/25   Jhonny Cuevas DO        PAT ROS   Constitutional: Negative for fever, chills, or sweats   ENMT: Negative for nasal discharge, congestion, ear pain, mouth pain, throat pain +eyeglasses  Respiratory: Negative for cough, wheezing, shortness of breath   Cardiac: Negative for chest pain, dyspnea on exertion, palpitations   Gastrointestinal: Negative for nausea, vomiting, diarrhea, constipation, abdominal pain    Genitourinary: Negative for dysuria, frequency, hematuria  +occasional left flank pains     Musculoskeletal: Negative for decreased ROM, pain, swelling, weakness   Neurological: Negative for dizziness, confusion, headache  Psychiatric: Negative for mood changes   Skin: Negative for itching, rash, ulcer    Hematologic/Lymph: Negative for bruising, easy bleeding  Allergic/Immunologic: Negative itching, sneezing, swelling      Physical Exam  Constitutional:       Appearance: Normal appearance.   HENT:      Head: Normocephalic.      Mouth/Throat:      Mouth: Mucous membranes are moist.   Eyes:      Extraocular Movements: Extraocular movements intact.   Cardiovascular:      Rate and Rhythm: Normal rate and regular rhythm.   Pulmonary:      Effort: Pulmonary effort is normal.      Breath sounds: Normal breath sounds.   Abdominal:      General: Abdomen is flat.      Palpations: Abdomen is soft.   Musculoskeletal:         General: Normal range of motion.      Cervical back: Normal range of motion.   Skin:     General: Skin is warm and dry.   Neurological:      General: No focal deficit present.      Mental Status: He is alert.   Psychiatric:         Mood and Affect: Mood normal.          PAT AIRWAY:   Airway:     Mallampati::  II    Neck ROM::  Full  normal        Testing/Diagnostic:   Lab Results   Component Value Date    WBC 7.0 05/12/2025    HGB 16.8 05/12/2025    HCT 48.5 05/12/2025     "MCV 94 05/12/2025     05/12/2025     Lab Results   Component Value Date    GLUCOSE 116 (H) 05/12/2025    CALCIUM 9.3 05/12/2025     05/12/2025    K 4.2 05/12/2025    CO2 26 05/12/2025     05/12/2025    BUN 20 05/12/2025    CREATININE 1.11 05/12/2025     Patient Specialist/PCP: Dr. Cuevas    Visit Vitals  /65   Pulse 62   Temp 35.8 °C (96.4 °F) (Temporal)   Resp 15   Ht 1.803 m (5' 11\")   Wt 116 kg (255 lb 8.2 oz)   SpO2 97%   BMI 35.64 kg/m²   Smoking Status Never   BSA 2.41 m²       DASI Risk Score      Flowsheet Row Pre-Admission Testing from 6/5/2025 in [facility] Pre-Admission Testing from 3/8/2024 in [facility]   Can you take care of yourself (eat, dress, bathe, or use toilet)?  2.75 filed at 06/05/2025 1311 2.75 filed at 03/08/2024 1434   Can you walk indoors, such as around your house? 1.75 filed at 06/05/2025 1311 1.75 filed at 03/08/2024 1434   Can you walk a block or two on level ground?  2.75 filed at 06/05/2025 1311 2.75 filed at 03/08/2024 1434   Can you climb a flight of stairs or walk up a hill? 5.5 filed at 06/05/2025 1311 5.5 filed at 03/08/2024 1434   Can you run a short distance? 8 filed at 06/05/2025 1311 8 filed at 03/08/2024 1434   Can you do light work around the house like dusting or washing dishes? 2.7 filed at 06/05/2025 1311 2.7 filed at 03/08/2024 1434   Can you do moderate work around the house like vacuuming, sweeping floors or carrying groceries? 3.5 filed at 06/05/2025 1311 3.5 filed at 03/08/2024 1434   Can you do heavy work around the house like scrubbing floors or lifting and moving heavy furniture?  0 filed at 06/05/2025 1311 0 filed at 03/08/2024 1434   Can you do yard work like raking leaves, weeding or pushing a mower? 4.5 filed at 06/05/2025 1311 4.5 filed at 03/08/2024 1434   Can you have sexual relations? 5.25 filed at 06/05/2025 1311 5.25 filed at 03/08/2024 1434   Can you participate in moderate recreational " activities like golf, bowling, dancing, doubles tennis or throwing a baseball or football? 6 filed at 06/05/2025 1311 6 filed at 03/08/2024 1434   Can you participate in strenous sports like swimming, singles tennis, football, basketball, or skiing? 7.5 filed at 06/05/2025 1311 7.5 filed at 03/08/2024 1434   DASI SCORE 50.2 filed at 06/05/2025 1311 50.2 filed at 03/08/2024 1434   METS Score (Will be calculated only when all the questions are answered) 8.9 filed at 06/05/2025 1311 8.9 filed at 03/08/2024 1434          Caprini DVT Assessment      Flowsheet Row Pre-Admission Testing from 6/5/2025 in [facility] Admission (Discharged) from 3/22/2024 in [facility] 9 with Hany Espino MD   DVT Score (IF A SCORE IS NOT CALCULATING, MUST SELECT A BMI TO COMPLETE) 10 filed at 06/05/2025 1310 6 filed at 03/22/2024 1405   Medical Factors History of DVT/PE filed at 06/05/2025 1310 --   Surgical Factors Major surgery planned, including arthroscopic and laproscopic (1-2 hours) filed at 06/05/2025 1310 --   BMI (BMI MUST BE CHOSEN) 31-40 (Obesity) filed at 06/05/2025 1310 31-40 (Obesity) filed at 03/22/2024 1405   RETIRED: History -- Prior major surgery filed at 03/22/2024 1405   RETIRED: Age -- 60-75 years filed at 03/22/2024 1405          Modified Frailty Index      Flowsheet Row Pre-Admission Testing from 6/5/2025 in [facility] Pre-Admission Testing from 3/8/2024 in [facility]   Non-independent functional status (problems with dressing, bathing, personal grooming, or cooking) 0 filed at 06/05/2025 1312 0 filed at 03/08/2024 1459   History of diabetes mellitus  0 filed at 06/05/2025 1312 0 filed at 03/08/2024 1459   History of COPD 0 filed at 06/05/2025 1312 0 filed at 03/08/2024 1459   History of CHF No filed at 06/05/2025 1312 No filed at 03/08/2024 1459   History of MI 0 filed at 06/05/2025 1312 0 filed at 03/08/2024 1459   History of  Percutaneous Coronary Intervention, Cardiac Surgery, or Angina No filed at 06/05/2025 1312 No filed at 03/08/2024 1459   Hypertension requiring the use of medication  0.0909 filed at 06/05/2025 1312 0.0909 filed at 03/08/2024 1459   Peripheral vascular disease 0 filed at 06/05/2025 1312 0 filed at 03/08/2024 1459   Impaired sensorium (cognitive impairement or loss, clouding, or delirium) 0.0909 filed at 06/05/2025 1312 0 filed at 03/08/2024 1459   TIA or CVA withouy residual deficit 0 filed at 06/05/2025 1312 0 filed at 03/08/2024 1459   Cerebrovascular accident with deficit 0 filed at 06/05/2025 1312 0 filed at 03/08/2024 1459   Modified Frailty Index Calculator .1818 filed at 06/05/2025 1312 .0909 filed at 03/08/2024 1459          [id number] Stroke Risk Points  Current as of 32 minutes ago        N/A 0 to 9 Points:      Last Change: N/A          The [id number] risk score (Lip GH, et al. 2009. © 2010 [employer]) quantifies the risk of stroke for a patient with atrial fibrillation. For patients without atrial fibrillation or under the age of 18 this score appears as N/A. Higher score values generally indicate higher risk of stroke.        This score is not applicable to this patient. Components are not calculated.          Revised Cardiac Risk Index      Flowsheet Row Pre-Admission Testing from 6/5/2025 in [facility] Pre-Admission Testing from 3/8/2024 in [facility]   High-Risk Surgery (Intraperitoneal, Intrathoracic,Suprainguinal vascular) 1 filed at 06/05/2025 1312 0 filed at 03/08/2024 1459   History of ischemic heart disease (History of MI, History of positive exercuse test, Current chest paint considered due to myocardial ischemia, Use of nitrate therapy, ECG with pathological Q Waves) 0 filed at 06/05/2025 1312 0 filed at 03/08/2024 1459   History of congestive heart failure (pulmonary edemia, bilateral rales or S3 gallop, Paroxysmal nocturnal  dyspnea, CXR showing pulmonary vascular redistribution) 0 filed at 06/05/2025 1312 0 filed at 03/08/2024 1459   History of cerebrovascular disease (Prior TIA or stroke) 0 filed at 06/05/2025 1312 0 filed at 03/08/2024 1459   Pre-operative insulin treatment 0 filed at 06/05/2025 1312 0 filed at 03/08/2024 1459   Pre-operative creatinine>2 mg/dl 0 filed at 06/05/2025 1312 0 filed at 03/08/2024 1459   Revised Cardiac Risk Calculator 1 filed at 06/05/2025 1312 0 filed at 03/08/2024 1459          Apfel Simplified Score      Flowsheet Row Pre-Admission Testing from 6/5/2025 in [facility] Pre-Admission Testing from 3/8/2024 in [facility]   Smoking status 1 filed at 06/05/2025 1312 1 filed at 03/08/2024 1459   History of motion sickness or PONV  0 filed at 06/05/2025 1312 0 filed at 03/08/2024 1459   Use of postoperative opioids 0 filed at 06/05/2025 1312 1 filed at 03/08/2024 1459   Gender - Female 0=No filed at 06/05/2025 1312 --   Apfel Simplified Score Calculator 1 filed at 06/05/2025 1312 2 filed at 03/08/2024 1459          Risk Analysis Index Results This Encounter         6/5/2025  1312             Do you live in a place other than your own home?: 0    When did you begin living in the place you are currently residing?: Greater than one year ago    Any kidney failure, kidney not working well, or seeing a kidney doctor (nephrologist)? If yes, was this for kidney stones or another problem?: 0 No    Any history of chronic (long-term) congestive heart failure (CHF)?: 0 No    Any shortness of breath when resting?: 0 No    In the past five years, have you been diagnosed with or treated for cancer?: No    During the last 3 months has it become difficult for you to remember things or organize your thoughts?: 1 Yes    Have you lost weight of 10 pounds or more in the past 3 months without trying?: 0 No    Do you have any loss of appetitie?: 0 No    Getting Around (Mobility): 0 Can get around  without help    Eatin Can plan and prepare own meals    Toiletin Can use toilet without any help    Personal Hygiene (Bathing, Hand Washing, Changing Clothes): 0 Can shower or bathe without any help    VOGEL Cancer History: Patient does not indicate history of cancer    Total Risk Analysis Index Score Without Cancer: 32    Total Risk Analysis Index Score: 32          Stop Bang Score      Flowsheet Row Pre-Admission Testing from 2025 in [facility] Pre-Admission Testing from 3/8/2024 in [facility]   Do you snore loudly? 0 filed at 2025 1311 1 filed at 2024 1434   Do you often feel tired or fatigued after your sleep? 0 filed at 2025 1311 0 filed at 2024 1434   Has anyone ever observed you stop breathing in your sleep? 0 filed at 2025 1311 0 filed at 2024 1434   Do you have or are you being treated for high blood pressure? 1 filed at 2025 1311 1 filed at 2024 1434   Recent BMI (Calculated) 35.7 filed at 2025 1311 34.3 filed at 2024 1434   Is BMI greater than 35 kg/m2? 1=Yes filed at 2025 1311 0=No filed at 2024 1434   Age older than 50 years old? 1=Yes filed at 2025 1311 1=Yes filed at 2024 1434   Is your neck circumference greater than 17 inches (Male) or 16 inches (Female)? 1 filed at 2025 1311 0 filed at 2024 1434   Gender - Male 1=Yes filed at 2025 1311 1=Yes filed at 2024 1434   STOP-BANG Total Score 5 filed at 2025 1311 4 filed at 2024 1434          Prodigy: High Risk  Total Score: 20              Prodigy Age Score      Prodigy Gender Score          ARISCAT Score for Postoperative Pulmonary Complications      Flowsheet Row Pre-Admission Testing from 2025 in [facility]   Age Calculated Score 3 filed at 2025 1313   Preoperative SpO2 0 filed at 2025 1313   Respiratory infection in the last month Either upper or lower (i.e.,  URI, bronchitis, pneumonia), with fever and antibiotic treatment 0 filed at 2025 1313   Preoperative anemia (Hgb less than 10 g/dl) 0 filed at 2025 1313   Surgical incision  0 filed at 2025 1313   Duration of surgery  0 filed at 2025 1313   Emergency Procedure  0 filed at 2025 1313   ARISCAT Total Score  3 filed at 2025 1313          Sunil Perioperative Risk for Myocardial Infarction or Cardiac Arrest (MOHINI)      Flowsheet Row Pre-Admission Testing from 2025 in [facility]   Calculated Age Score 1.5 filed at 2025 1313   Functional Status  0 filed at 2025 1313   ASA Class  -3.29 filed at 2025 1313   Creatinine 0 filed at 2025 1313   Type of Procedure  1.13 filed at 2025 1313   MOHINI Total Score  -5.91 filed at 2025 1313   MOHINI % 0.27 filed at 2025 1313            Assessment and Plan:     Pre-Op  76 yo male scheduled for CT GUIDED CRYOABLATION RENAL LEFT with US GUIDED PERCUTANEOUS BIOPSY RENAL LEFT  on 2025.  Recent blood work on file from 2025. Previous EKG on file from 2025, shows normal sinus rhythm with nonspecific ST wave abnormalities. Otherwise no further orders indicated.     Cardiac  Hypertension, compliant with taking lisinopril  Hyperlipidemia, taking daily low-dose aspirin  DASI Score: 50.2 50.2  MET Score: 8.98.9  RCRI  1 which is 6% 30 day risk of MACE (risk for cardiac death, nonfatal myocardial infarction, and nonfactal cardiac arrest)  MOHINI score which indicates a  0.27 % risk of intraoperative or 30-day postoperative MACE    Pulmonary  No diagnosis or significant findings on chart review or clinical presentation and evaluation.    Preoperative deep breathing educational handout provided to patient.  STOP BAN  points which is a high risk for moderate to severe PETERSON  ARISCAT:   3   points which is a low (1.6%) risk of in-hospital post-op pulmonary complications     Neuro  Short term  memory loss  Tremor    the patient is at an increased risk for post operative delirium secondary to age >/= 65 and type and duration of surgery.  Preoperative brain exercise educational handout provided to patient.    the patient is at an increased risk for perioperative stroke secondary to increased age, HTN, HLD, and general anesthesia.    Endocrine  BMI 33.47    GI  History of small bowel obstruction status post surgical intervention for repair  History of ventral hernia status post surgery for March 2024  GERD, taking PPI  Apfel: 1 points 21% risk for post operative N/V    /Renal  -BPH  -Kidney mass, left side- reason for upcoming procedure; initially diagnosed in 2022 and has been under surveillance  -Counseled on avoiding NSAIDs, adequate hydration    Musculoskeletal   Osteoarthritis    Hematologic  History of DVT, provoked by long travel time; about 5 years ago    due to high Caprini score of 10 patient is at HIGH risk for perioperative DVT, informative education handout provided    Psychiatric  Depression, taking Lexapro  Anxiety    Skin check: Patient was instructed to make surgeon aware of any skin changes/concerns prior to surgery.     Anesthesia:  Patient denies any anesthesia complications.   -short term memory issues    See risk scores as previously documented.          [1]   Past Medical History:  Diagnosis Date    MAT (acute kidney injury)     Anxiety     Blood disorder     BPH (benign prostatic hyperplasia)     taking tamsulosin    Colon polyp 2010    Depression     DVT (deep venous thrombosis) (Multi) 08/03/2023    provoked after 8H road trip    Fatty liver     GERD (gastroesophageal reflux disease)     F/W PCP- taking protonix    Hernia, internal 2012    HL (hearing loss)     b/l    Hyperlipidemia     F/W cards- taking simvastatin    Hypertension     F/W cards- takes lisinopril    Incisional hernia     Renal cyst     Renal lesion 04/2023    s/p biopsy    Small bowel obstruction (Multi)  08/21/2023    Vision loss     wears glasses   [2]   Past Surgical History:  Procedure Laterality Date    COLON SURGERY  2012    right hemicolectomy    COLONOSCOPY  01/10/2024    EXPLORATORY LAPAROTOMY  03/22/2024    lysis of adhesions, repair of incisional hernia with mesh    EYE SURGERY      Eye Surgery    HEMICOLECTOMY Right     HERNIA REPAIR      ventral hernia repair with mesh    RENAL BIOPSY  04/2023    TONSILLECTOMY     [3]   Family History  Problem Relation Name Age of Onset    Stroke Mother Elsy     Coronary artery disease Father      Seizures Sister     [4] No Known Allergies

## 2025-06-05 NOTE — PREPROCEDURE INSTRUCTIONS
Medication List            Accurate as of June 5, 2025  1:30 PM. Always use your most recent med list.                acetaminophen 325 mg tablet  Commonly known as: Tylenol  Take 2 tablets (650 mg) by mouth every 6 hours if needed for mild pain (1 - 3) or moderate pain (4 - 6).  Medication Adjustments for Surgery: Take/Use as prescribed  Additional Medication Adjustments for Surgery: Other (Comment)  Notes to patient: Tylenol (Acetaminophen) is okay to take up until the morning of surgery for pain or headache as needed      aspirin 81 mg EC tablet  Additional Medication Adjustments for Surgery: Take last dose 7 days before surgery     cyanocobalamin 1,000 mcg tablet  Commonly known as: Vitamin B-12  Additional Medication Adjustments for Surgery: Take last dose 7 days before surgery  Notes to patient: STOP all NSAIDS (Ibuprofen, Motrin, Aleve), vitamins, herbals, supplements, and all over the counter medications for 7 days prior to surgery     escitalopram 5 mg tablet  Commonly known as: Lexapro  Take 1 tablet (5 mg) by mouth once daily.  Medication Adjustments for Surgery: Take/Use as prescribed     lisinopril 10 mg tablet  Take 1 tablet (10 mg) by mouth once daily.  Additional Medication Adjustments for Surgery: Other (Comment)  Notes to patient: HOLD any evening dose the night before the day of surgery  HOLD the day of surgery     pantoprazole 40 mg EC tablet  Commonly known as: ProtoNix  Take 1 tablet (40 mg) by mouth once daily.  Medication Adjustments for Surgery: Take/Use as prescribed     simvastatin 20 mg tablet  Commonly known as: Zocor  Take 1 tablet (20 mg) by mouth once daily in the evening.  Medication Adjustments for Surgery: Take/Use as prescribed     tamsulosin 0.4 mg 24 hr capsule  Commonly known as: Flomax  Take 1 capsule (0.4 mg) by mouth once daily.  Medication Adjustments for Surgery: Take/Use as prescribed                                PRE-OPERATIVE INSTRUCTIONS    You will receive  notification one business day prior to your procedure to confirm your arrival time. It is important that you answer your phone and/or check your messages during this time. If you do not hear from the surgery center by 5 pm. the day before your procedure, please call [phone].     Please enter the building through the Outpatient entrance and take the elevator off the lobby to the 2nd floor then check in at the Outpatient Surgery desk on the 2nd floor.    INSTRUCTIONS:  Talk to your surgeon for instructions if you should stop your aspirin, blood thinner, or diabetes medicines.  DO NOT take any multivitamins or over the counter supplements for 7-10 days before surgery.  If not being admitted, you must have an adult immediately available to drive you home after surgery. We also highly recommend you have someone stay with you for the entire day and night of your surgery.  For children having surgery, a parent or legal guardian must accompany them to the surgery center. If this is not possible, please call [phone] to make additional arrangements.  For adults who are unable to consent or make medical decisions for themselves, a legal guardian or Power of  must accompany them to the surgery center. If this is not possible, please call [phone] to make additional arrangements.  Wear comfortable, loose fitting clothing.  All jewelry and piercings must be removed. If you are unable to remove an item or have a dermal piercing, please be sure to tell the nurse when you arrive for surgery.  Nail polish and make-up must be removed.  Avoid smoking or consuming alcohol for 24 hours before surgery.  To help prevent infection, please take a shower/bath and wash your hair the night before and/or morning of surgery (or follow other specific bathing instructions provided).    Preoperative Fasting Guidelines    Why must I stop eating and drinking near surgery time?  With sedation, food or liquid in your stomach can  enter your lungs causing serious complications  Increases nausea and vomiting    When do I need to stop eating and drinking before my surgery?  Do not eat any solid food after midnight the night before your surgery/procedure unless otherwise instructed by your surgeon.   If you take a GLP-1 medication (ex: Trulicity, Ozempic, Mounjaro, Zepbound, Bydyreon, Tanzeun, Saxenda, Victoza, Adlyxin, Rybelus) please follow other specific instructions provided regarding preoperative          fasting.  You may have up to 13.5 ounces of clear liquid until TWO hours before your instructed arrival time to the hospital.  This includes water, black tea/coffee, (no milk or cream) apple juice, and electrolyte drinks (Gatorade).   You may chew gum until TWO hours before your surgery/procedure         If applicable, notify your surgeons office immediately of any new skin changes that occur to the surgical limb.      If you have any questions or concerns, please call Pre-Admission Testing at [phone].                                 Home Preoperative Antibacterial Shower with Chlorhexidine gluconate (CHG)     What is a home preoperative antibacterial shower?  This shower is a way of cleaning the skin with a germ killing solution before surgery. The solution contains chlorhexidine gluconate, commonly known as CHG. CHG is a skin cleanser with germ killing ability. Let your doctor know if you are allergic to chlorhexidine.    Why do I need to take a preoperative antibacterial shower?  Skin is not sterile. It is best to try to make your skin as free of germs as possible before surgery. Proper cleansing with a germ killing soap before surgery can lower the number of germs on your skin. This helps to reduce the risk of infection at the surgical site. Following the instructions listed below will help you prepare your skin for surgery.    How do I use the solution?  Begin using your CHG soap the night before and again the morning of your  procedure.   Do not shave the day before or day of surgery.  Remove all jewelry until after surgery. Take off rings and take out all body-piercing jewelry.  Wash your face and hair with normal soap and shampoo before you use the CHG soap.  Apply the CHG solution to a clean wet washcloth. Move away from the water to avoid premature rinsing of the CHG soap as you are applying. Firmly lather your entire body from the neck down. Do not use CHG on your face, eyes, ears, or genitals.   Pay special attention to the area where your incisions will be located.  Do not scrub your skin too hard.  It is important to allow the CHG soap to sit on your skin for 3-5 minutes.  Rinse the solution off your body completely. Do not wash with your normal soap after the CHG soap solution.  Pat yourself dry with a clean, soft towel.  Do not apply powders, lotions or deodorants as these might block how the CHG soap works.   Dress in clean clothing.  Be sure to sleep with clean, freshly laundered sheets.  Be aware that CHG can cause stains on fabric. Rinse your washcloth and other linens that have contact with CHG completely. Use only non-chlorine detergents to launder the items used.

## 2025-06-06 ENCOUNTER — APPOINTMENT (OUTPATIENT)
Dept: NEUROLOGY | Facility: CLINIC | Age: 75
End: 2025-06-06
Payer: MEDICARE

## 2025-06-19 ENCOUNTER — HOSPITAL ENCOUNTER (OUTPATIENT)
Dept: RADIOLOGY | Facility: HOSPITAL | Age: 75
Discharge: HOME | End: 2025-06-19
Payer: MEDICARE

## 2025-06-19 ENCOUNTER — ANESTHESIA EVENT (OUTPATIENT)
Dept: RADIOLOGY | Facility: HOSPITAL | Age: 75
End: 2025-06-19
Payer: MEDICARE

## 2025-06-19 ENCOUNTER — ANESTHESIA (OUTPATIENT)
Dept: RADIOLOGY | Facility: HOSPITAL | Age: 75
End: 2025-06-19
Payer: MEDICARE

## 2025-06-19 VITALS
SYSTOLIC BLOOD PRESSURE: 116 MMHG | WEIGHT: 253 LBS | TEMPERATURE: 97.3 F | BODY MASS INDEX: 35.42 KG/M2 | DIASTOLIC BLOOD PRESSURE: 51 MMHG | HEIGHT: 71 IN | OXYGEN SATURATION: 96 % | RESPIRATION RATE: 17 BRPM | HEART RATE: 69 BPM

## 2025-06-19 DIAGNOSIS — D30.00 RENAL ONCOCYTOMA, UNSPECIFIED LATERALITY: ICD-10-CM

## 2025-06-19 LAB
ANION GAP SERPL CALC-SCNC: 11 MMOL/L (ref 10–20)
BUN SERPL-MCNC: 15 MG/DL (ref 6–23)
CALCIUM SERPL-MCNC: 9 MG/DL (ref 8.6–10.3)
CHLORIDE SERPL-SCNC: 106 MMOL/L (ref 98–107)
CO2 SERPL-SCNC: 27 MMOL/L (ref 21–32)
CREAT SERPL-MCNC: 0.96 MG/DL (ref 0.5–1.3)
EGFRCR SERPLBLD CKD-EPI 2021: 82 ML/MIN/1.73M*2
ERYTHROCYTE [DISTWIDTH] IN BLOOD BY AUTOMATED COUNT: 13 % (ref 11.5–14.5)
GLUCOSE SERPL-MCNC: 112 MG/DL (ref 74–99)
HCT VFR BLD AUTO: 44.8 % (ref 41–52)
HGB BLD-MCNC: 15.6 G/DL (ref 13.5–17.5)
INR PPP: 1.1 (ref 0.9–1.1)
MCH RBC QN AUTO: 33.3 PG (ref 26–34)
MCHC RBC AUTO-ENTMCNC: 34.8 G/DL (ref 32–36)
MCV RBC AUTO: 96 FL (ref 80–100)
NRBC BLD-RTO: 0 /100 WBCS (ref 0–0)
PLATELET # BLD AUTO: 140 X10*3/UL (ref 150–450)
POTASSIUM SERPL-SCNC: 4.1 MMOL/L (ref 3.5–5.3)
PROTHROMBIN TIME: 11.7 SECONDS (ref 9.8–12.4)
RBC # BLD AUTO: 4.69 X10*6/UL (ref 4.5–5.9)
SODIUM SERPL-SCNC: 140 MMOL/L (ref 136–145)
WBC # BLD AUTO: 5.1 X10*3/UL (ref 4.4–11.3)

## 2025-06-19 PROCEDURE — 2500000004 HC RX 250 GENERAL PHARMACY W/ HCPCS (ALT 636 FOR OP/ED): Performed by: ANESTHESIOLOGY

## 2025-06-19 PROCEDURE — C1819 TISSUE LOCALIZATION-EXCISION: HCPCS

## 2025-06-19 PROCEDURE — A50593 PR ABLATION RENAL TUMOR UNILATERAL PERQ CRYOTHERAPY: Performed by: NURSE ANESTHETIST, CERTIFIED REGISTERED

## 2025-06-19 PROCEDURE — 3700000002 HC GENERAL ANESTHESIA TIME - EACH INCREMENTAL 1 MINUTE

## 2025-06-19 PROCEDURE — 50200 RENAL BIOPSY PERQ: CPT | Mod: LT

## 2025-06-19 PROCEDURE — 2720000007 HC OR 272 NO HCPCS

## 2025-06-19 PROCEDURE — 2500000004 HC RX 250 GENERAL PHARMACY W/ HCPCS (ALT 636 FOR OP/ED): Performed by: NURSE PRACTITIONER

## 2025-06-19 PROCEDURE — 7100000002 HC RECOVERY ROOM TIME - EACH INCREMENTAL 1 MINUTE

## 2025-06-19 PROCEDURE — 3700000001 HC GENERAL ANESTHESIA TIME - INITIAL BASE CHARGE

## 2025-06-19 PROCEDURE — 7100000009 HC PHASE TWO TIME - INITIAL BASE CHARGE

## 2025-06-19 PROCEDURE — 85610 PROTHROMBIN TIME: CPT | Performed by: RADIOLOGY

## 2025-06-19 PROCEDURE — 36415 COLL VENOUS BLD VENIPUNCTURE: CPT | Performed by: RADIOLOGY

## 2025-06-19 PROCEDURE — 85027 COMPLETE CBC AUTOMATED: CPT | Performed by: RADIOLOGY

## 2025-06-19 PROCEDURE — 50593 PERC CRYO ABLATE RENAL TUM: CPT | Mod: LT

## 2025-06-19 PROCEDURE — 7100000001 HC RECOVERY ROOM TIME - INITIAL BASE CHARGE

## 2025-06-19 PROCEDURE — 2500000004 HC RX 250 GENERAL PHARMACY W/ HCPCS (ALT 636 FOR OP/ED): Mod: JW | Performed by: RADIOLOGY

## 2025-06-19 PROCEDURE — 99100 ANES PT EXTEME AGE<1 YR&>70: CPT | Performed by: ANESTHESIOLOGY

## 2025-06-19 PROCEDURE — C2618 PROBE/NEEDLE, CRYO: HCPCS

## 2025-06-19 PROCEDURE — 80048 BASIC METABOLIC PNL TOTAL CA: CPT | Performed by: RADIOLOGY

## 2025-06-19 PROCEDURE — 7100000010 HC PHASE TWO TIME - EACH INCREMENTAL 1 MINUTE

## 2025-06-19 PROCEDURE — A50593 PR ABLATION RENAL TUMOR UNILATERAL PERQ CRYOTHERAPY: Performed by: ANESTHESIOLOGY

## 2025-06-19 PROCEDURE — 2500000004 HC RX 250 GENERAL PHARMACY W/ HCPCS (ALT 636 FOR OP/ED): Performed by: NURSE ANESTHETIST, CERTIFIED REGISTERED

## 2025-06-19 RX ORDER — OXYCODONE AND ACETAMINOPHEN 5; 325 MG/1; MG/1
1 TABLET ORAL EVERY 4 HOURS PRN
Status: DISCONTINUED | OUTPATIENT
Start: 2025-06-19 | End: 2025-06-20 | Stop reason: HOSPADM

## 2025-06-19 RX ORDER — FENTANYL CITRATE 50 UG/ML
INJECTION, SOLUTION INTRAMUSCULAR; INTRAVENOUS AS NEEDED
Status: DISCONTINUED | OUTPATIENT
Start: 2025-06-19 | End: 2025-06-19

## 2025-06-19 RX ORDER — PROPOFOL 10 MG/ML
INJECTION, EMULSION INTRAVENOUS AS NEEDED
Status: DISCONTINUED | OUTPATIENT
Start: 2025-06-19 | End: 2025-06-19

## 2025-06-19 RX ORDER — CEFAZOLIN SODIUM 2 G/100ML
INJECTION, SOLUTION INTRAVENOUS AS NEEDED
Status: DISCONTINUED | OUTPATIENT
Start: 2025-06-19 | End: 2025-06-19

## 2025-06-19 RX ORDER — GLYCOPYRROLATE 0.2 MG/ML
INJECTION INTRAMUSCULAR; INTRAVENOUS AS NEEDED
Status: DISCONTINUED | OUTPATIENT
Start: 2025-06-19 | End: 2025-06-19

## 2025-06-19 RX ORDER — HYDRALAZINE HYDROCHLORIDE 20 MG/ML
5 INJECTION INTRAMUSCULAR; INTRAVENOUS ONCE
Status: COMPLETED | OUTPATIENT
Start: 2025-06-19 | End: 2025-06-19

## 2025-06-19 RX ORDER — OXYCODONE HYDROCHLORIDE 5 MG/1
5 TABLET ORAL EVERY 4 HOURS PRN
Status: DISCONTINUED | OUTPATIENT
Start: 2025-06-19 | End: 2025-06-20 | Stop reason: HOSPADM

## 2025-06-19 RX ORDER — ROCURONIUM BROMIDE 10 MG/ML
INJECTION, SOLUTION INTRAVENOUS AS NEEDED
Status: DISCONTINUED | OUTPATIENT
Start: 2025-06-19 | End: 2025-06-19

## 2025-06-19 RX ORDER — LABETALOL HYDROCHLORIDE 5 MG/ML
5 INJECTION, SOLUTION INTRAVENOUS ONCE AS NEEDED
Status: DISCONTINUED | OUTPATIENT
Start: 2025-06-19 | End: 2025-06-20 | Stop reason: HOSPADM

## 2025-06-19 RX ORDER — ACETAMINOPHEN 325 MG/1
975 TABLET ORAL ONCE
Status: DISCONTINUED | OUTPATIENT
Start: 2025-06-19 | End: 2025-06-20 | Stop reason: HOSPADM

## 2025-06-19 RX ORDER — LIDOCAINE HYDROCHLORIDE 10 MG/ML
0.1 INJECTION, SOLUTION EPIDURAL; INFILTRATION; INTRACAUDAL; PERINEURAL ONCE
Status: DISCONTINUED | OUTPATIENT
Start: 2025-06-19 | End: 2025-06-20 | Stop reason: HOSPADM

## 2025-06-19 RX ORDER — SODIUM CHLORIDE, SODIUM LACTATE, POTASSIUM CHLORIDE, CALCIUM CHLORIDE 600; 310; 30; 20 MG/100ML; MG/100ML; MG/100ML; MG/100ML
100 INJECTION, SOLUTION INTRAVENOUS CONTINUOUS
Status: DISCONTINUED | OUTPATIENT
Start: 2025-06-19 | End: 2025-06-20 | Stop reason: HOSPADM

## 2025-06-19 RX ORDER — ONDANSETRON HYDROCHLORIDE 2 MG/ML
4 INJECTION, SOLUTION INTRAVENOUS ONCE AS NEEDED
Status: DISCONTINUED | OUTPATIENT
Start: 2025-06-19 | End: 2025-06-20 | Stop reason: HOSPADM

## 2025-06-19 RX ORDER — MAGNESIUM SULFATE HEPTAHYDRATE 500 MG/ML
INJECTION, SOLUTION INTRAMUSCULAR; INTRAVENOUS AS NEEDED
Status: DISCONTINUED | OUTPATIENT
Start: 2025-06-19 | End: 2025-06-19

## 2025-06-19 RX ORDER — LIDOCAINE HYDROCHLORIDE 10 MG/ML
INJECTION, SOLUTION EPIDURAL; INFILTRATION; INTRACAUDAL; PERINEURAL AS NEEDED
Status: DISCONTINUED | OUTPATIENT
Start: 2025-06-19 | End: 2025-06-19

## 2025-06-19 RX ORDER — LIDOCAINE HYDROCHLORIDE 10 MG/ML
INJECTION, SOLUTION EPIDURAL; INFILTRATION; INTRACAUDAL; PERINEURAL
Status: COMPLETED | OUTPATIENT
Start: 2025-06-19 | End: 2025-06-19

## 2025-06-19 RX ORDER — MIDAZOLAM HYDROCHLORIDE 1 MG/ML
INJECTION, SOLUTION INTRAMUSCULAR; INTRAVENOUS AS NEEDED
Status: DISCONTINUED | OUTPATIENT
Start: 2025-06-19 | End: 2025-06-19

## 2025-06-19 RX ORDER — FAMOTIDINE 10 MG/ML
20 INJECTION, SOLUTION INTRAVENOUS ONCE
Status: DISCONTINUED | OUTPATIENT
Start: 2025-06-19 | End: 2025-06-20 | Stop reason: HOSPADM

## 2025-06-19 RX ORDER — HYDRALAZINE HYDROCHLORIDE 20 MG/ML
5 INJECTION INTRAMUSCULAR; INTRAVENOUS EVERY 30 MIN PRN
Status: DISCONTINUED | OUTPATIENT
Start: 2025-06-19 | End: 2025-06-20 | Stop reason: HOSPADM

## 2025-06-19 RX ORDER — HYDROMORPHONE HYDROCHLORIDE 1 MG/ML
0.2 INJECTION, SOLUTION INTRAMUSCULAR; INTRAVENOUS; SUBCUTANEOUS EVERY 5 MIN PRN
Status: DISCONTINUED | OUTPATIENT
Start: 2025-06-19 | End: 2025-06-20 | Stop reason: HOSPADM

## 2025-06-19 RX ORDER — HYDRALAZINE HYDROCHLORIDE 20 MG/ML
10 INJECTION INTRAMUSCULAR; INTRAVENOUS ONCE
Status: DISCONTINUED | OUTPATIENT
Start: 2025-06-19 | End: 2025-06-19

## 2025-06-19 RX ORDER — DIPHENHYDRAMINE HYDROCHLORIDE 50 MG/ML
12.5 INJECTION, SOLUTION INTRAMUSCULAR; INTRAVENOUS ONCE AS NEEDED
Status: DISCONTINUED | OUTPATIENT
Start: 2025-06-19 | End: 2025-06-20 | Stop reason: HOSPADM

## 2025-06-19 RX ORDER — METOCLOPRAMIDE HYDROCHLORIDE 5 MG/ML
10 INJECTION INTRAMUSCULAR; INTRAVENOUS ONCE AS NEEDED
Status: DISCONTINUED | OUTPATIENT
Start: 2025-06-19 | End: 2025-06-20 | Stop reason: HOSPADM

## 2025-06-19 RX ORDER — PHENYLEPHRINE HCL IN 0.9% NACL 1 MG/10 ML
SYRINGE (ML) INTRAVENOUS AS NEEDED
Status: DISCONTINUED | OUTPATIENT
Start: 2025-06-19 | End: 2025-06-19

## 2025-06-19 RX ORDER — HYDROMORPHONE HYDROCHLORIDE 1 MG/ML
0.5 INJECTION, SOLUTION INTRAMUSCULAR; INTRAVENOUS; SUBCUTANEOUS EVERY 5 MIN PRN
Status: DISCONTINUED | OUTPATIENT
Start: 2025-06-19 | End: 2025-06-20 | Stop reason: HOSPADM

## 2025-06-19 RX ORDER — ALBUTEROL SULFATE 0.83 MG/ML
2.5 SOLUTION RESPIRATORY (INHALATION) ONCE AS NEEDED
Status: DISCONTINUED | OUTPATIENT
Start: 2025-06-19 | End: 2025-06-20 | Stop reason: HOSPADM

## 2025-06-19 RX ADMIN — ROCURONIUM BROMIDE 10 MG: 10 INJECTION INTRAVENOUS at 09:00

## 2025-06-19 RX ADMIN — GLYCOPYRROLATE 0.1 MG: 0.2 INJECTION, SOLUTION INTRAMUSCULAR; INTRAVENOUS at 09:10

## 2025-06-19 RX ADMIN — PROPOFOL 20 MG: 10 INJECTION, EMULSION INTRAVENOUS at 09:39

## 2025-06-19 RX ADMIN — HYDROMORPHONE HYDROCHLORIDE 0.2 MG: 1 INJECTION, SOLUTION INTRAMUSCULAR; INTRAVENOUS; SUBCUTANEOUS at 11:02

## 2025-06-19 RX ADMIN — MAGNESIUM SULFATE HEPTAHYDRATE 2 G: 500 INJECTION, SOLUTION INTRAMUSCULAR; INTRAVENOUS at 08:59

## 2025-06-19 RX ADMIN — LIDOCAINE HYDROCHLORIDE 2 ML: 10 INJECTION, SOLUTION EPIDURAL; INFILTRATION; INTRACAUDAL; PERINEURAL at 08:51

## 2025-06-19 RX ADMIN — PROPOFOL 75 MCG/KG/MIN: 10 INJECTION, EMULSION INTRAVENOUS at 08:22

## 2025-06-19 RX ADMIN — LIDOCAINE HYDROCHLORIDE 3 ML: 10 INJECTION, SOLUTION EPIDURAL; INFILTRATION; INTRACAUDAL; PERINEURAL at 08:46

## 2025-06-19 RX ADMIN — MIDAZOLAM 2 MG: 1 INJECTION INTRAMUSCULAR; INTRAVENOUS at 08:13

## 2025-06-19 RX ADMIN — HYDRALAZINE HYDROCHLORIDE 5 MG: 20 INJECTION INTRAMUSCULAR; INTRAVENOUS at 12:39

## 2025-06-19 RX ADMIN — DEXAMETHASONE SODIUM PHOSPHATE 4 MG: 4 INJECTION INTRA-ARTICULAR; INTRALESIONAL; INTRAMUSCULAR; INTRAVENOUS; SOFT TISSUE at 08:38

## 2025-06-19 RX ADMIN — HYDROMORPHONE HYDROCHLORIDE 0.2 MG: 1 INJECTION, SOLUTION INTRAMUSCULAR; INTRAVENOUS; SUBCUTANEOUS at 10:28

## 2025-06-19 RX ADMIN — CEFAZOLIN SODIUM 2 G: 2 INJECTION, SOLUTION INTRAVENOUS at 08:27

## 2025-06-19 RX ADMIN — ROCURONIUM BROMIDE 50 MG: 10 INJECTION INTRAVENOUS at 08:17

## 2025-06-19 RX ADMIN — LIDOCAINE HYDROCHLORIDE 5 ML: 10 INJECTION, SOLUTION EPIDURAL; INFILTRATION; INTRACAUDAL; PERINEURAL at 08:16

## 2025-06-19 RX ADMIN — Medication 100 MCG: at 09:11

## 2025-06-19 RX ADMIN — HYDRALAZINE HYDROCHLORIDE 5 MG: 20 INJECTION INTRAMUSCULAR; INTRAVENOUS at 10:38

## 2025-06-19 RX ADMIN — SODIUM CHLORIDE, POTASSIUM CHLORIDE, SODIUM LACTATE AND CALCIUM CHLORIDE: 600; 310; 30; 20 INJECTION, SOLUTION INTRAVENOUS at 08:16

## 2025-06-19 RX ADMIN — FENTANYL CITRATE 50 MCG: 50 INJECTION, SOLUTION INTRAMUSCULAR; INTRAVENOUS at 08:16

## 2025-06-19 RX ADMIN — GLYCOPYRROLATE 0.2 MG: 0.2 INJECTION, SOLUTION INTRAMUSCULAR; INTRAVENOUS at 08:50

## 2025-06-19 RX ADMIN — PROPOFOL 200 MG: 10 INJECTION, EMULSION INTRAVENOUS at 08:16

## 2025-06-19 RX ADMIN — Medication 100 MCG: at 08:47

## 2025-06-19 RX ADMIN — FENTANYL CITRATE 50 MCG: 50 INJECTION, SOLUTION INTRAMUSCULAR; INTRAVENOUS at 08:23

## 2025-06-19 RX ADMIN — ROCURONIUM BROMIDE 10 MG: 10 INJECTION INTRAVENOUS at 09:37

## 2025-06-19 SDOH — HEALTH STABILITY: MENTAL HEALTH: CURRENT SMOKER: 0

## 2025-06-19 ASSESSMENT — PAIN SCALES - GENERAL
PAINLEVEL_OUTOF10: 1
PAINLEVEL_OUTOF10: 5 - MODERATE PAIN
PAINLEVEL_OUTOF10: 1
PAINLEVEL_OUTOF10: 3
PAINLEVEL_OUTOF10: 0 - NO PAIN
PAINLEVEL_OUTOF10: 1
PAINLEVEL_OUTOF10: 3
PAINLEVEL_OUTOF10: 2

## 2025-06-19 ASSESSMENT — PAIN DESCRIPTION - DESCRIPTORS
DESCRIPTORS: TENDER;SORE
DESCRIPTORS: ACHING;TENDER;STABBING
DESCRIPTORS: SORE
DESCRIPTORS: ACHING;TENDER
DESCRIPTORS: ACHING;SORE

## 2025-06-19 ASSESSMENT — PAIN - FUNCTIONAL ASSESSMENT
PAIN_FUNCTIONAL_ASSESSMENT: 0-10
PAIN_FUNCTIONAL_ASSESSMENT: UNABLE TO SELF-REPORT
PAIN_FUNCTIONAL_ASSESSMENT: 0-10

## 2025-06-19 NOTE — POST-PROCEDURE NOTE
Interventional Radiology Brief Postprocedure Note    Attending: Chaitanya Daniels MD      Assistant: none    Diagnosis:   1. Renal oncocytoma, unspecified laterality  CT guided renal left cryoablation    CT guided renal left cryoablation    Surgical Pathology Exam    Surgical Pathology Exam            Description of procedure: Left renal mass biopsy and cryoablation, 4 probes. See PACS report.     Anesthesia:  General    Complications: None    Estimated Blood Loss: minimal    Medications (Filter: Administrations occurring from 0746 to 0951 on 06/19/25) As of 06/19/25 0951      lidocaine PF (Xylocaine) 10 mg/mL (1 %) injection (mL) Total volume:  5 mL      Date/Time Rate/Dose/Volume Action       06/19/25  0846 3 mL Given      0851 2 mL Given                   ID Type Source Tests Collected by Time   1 : Left Renal Mass Biopsy Tissue KIDNEY MASS BIOPSY LEFT SURGICAL PATHOLOGY EXAM Chaitanya Daniels MD 6/19/2025 0857         See detailed result report with images in PACS.    The patient tolerated the procedure well without incident or complication and is in stable condition.

## 2025-06-19 NOTE — ANESTHESIA POSTPROCEDURE EVALUATION
Patient: Jovanny Yap    Procedure Summary       Date: 06/19/25 Room / Location: [facility]    Anesthesia Start: 0803 Anesthesia Stop: 1016    Procedure: CT GUIDED CRYOABLATION RENAL LEFT Diagnosis:       Renal oncocytoma, unspecified laterality      (left renal mass)    Scheduled Providers:  Responsible Provider: Ruben Kwong MD    Anesthesia Type: general ASA Status: 3            Anesthesia Type: general    Vitals Value Taken Time   /84 06/19/25 12:15   Temp 36.2 °C (97.2 °F) 06/19/25 12:00   Pulse 63 06/19/25 12:15   Resp 16 06/19/25 12:15   SpO2 94 % 06/19/25 12:15       Anesthesia Post Evaluation    Patient location during evaluation: PACU  Patient participation: complete - patient participated  Level of consciousness: awake and alert  Pain management: satisfactory to patient  Airway patency: patent  Cardiovascular status: acceptable  Respiratory status: acceptable  Hydration status: acceptable  Postoperative Nausea and Vomiting: none        No notable events documented.

## 2025-06-19 NOTE — Clinical Note
Left renal mass biopsy and cryoablation completed. Left side of back dressed with 4x4 and tegaderm. No s/s of bleeding. Pt to be extubated and taken to PACU following procedure.

## 2025-06-19 NOTE — ANESTHESIA PREPROCEDURE EVALUATION
Patient: Jovanny Yap    Procedure Information       Date/Time: 06/19/25 0730    Procedure: CT GUIDED CRYOABLATION RENAL LEFT    Location: [facility] [place]            Relevant Problems   Cardiac   (+) Benign essential hypertension   (+) Chest pain   (+) Hyperlipidemia      Neuro   (+) Anxiety disorder      GI   (+) GERD (gastroesophageal reflux disease)      Liver   (+) Fatty liver      Musculoskeletal   (+) Chronic low back pain      HEENT   (+) Bilateral sensorineural hearing loss       Clinical information reviewed:   Tobacco  Allergies  Meds   Med Hx  Surg Hx   Fam Hx  Soc Hx        NPO Detail:  NPO/Void Status  Carbohydrate Drink Given Prior to Surgery? : N  Date of Last Liquid: 06/18/25  Time of Last Liquid: 2200  Date of Last Solid: 06/18/25  Time of Last Solid: 1800  Last Intake Type: Clear fluids  Time of Last Void: 0530         Physical Exam    Airway  Mallampati: II  TM distance: >3 FB  Neck ROM: full  Mouth opening: 3 or more finger widths     Cardiovascular   Rhythm: regular  Rate: normal     Dental - normal exam     Pulmonary Breath sounds clear to auscultation     Abdominal            Anesthesia Plan    History of general anesthesia?: yes  History of complications of general anesthesia?: no    ASA 3     general     The patient is not a current smoker.    intravenous induction   Anesthetic plan and risks discussed with patient.    Plan discussed with CRNA.

## 2025-06-19 NOTE — PRE-PROCEDURE NOTE
Interventional Radiology Preprocedure Note    Indication for procedure: The encounter diagnosis was Renal oncocytoma, unspecified laterality. Left renal mass biopsy and ablation    Relevant review of systems: NA    Relevant Labs:   Lab Results   Component Value Date    CREATININE 0.96 06/19/2025    EGFR 82 06/19/2025    INR 1.1 06/19/2025    PROTIME 11.7 06/19/2025       Planned Sedation/Anesthesia: GA    Airway assessment: per anesthesia    Directed physical examination:    Awake and alert, oriented  No acute distress  Regular rate, rhythm  Breathing non-labored    Mallampati: per anesthesia    ASA Score: ASA 3 - Patient with moderate systemic disease with functional limitations    Benefits, risks and alternatives of procedure and planned sedation have been discussed with the patient and/or their representative. All questions answered and they agree to proceed.

## 2025-06-19 NOTE — ANESTHESIA PROCEDURE NOTES
Airway  Date/Time: 6/19/2025 8:19 AM  Reason: elective    Airway not difficult    Staffing  Performed: CRNA   Authorized by: Ruben Kwong MD    Performed by: JOURDAN Daniels-ANJELICA  Patient location during procedure: OR    Patient Condition  Indications for airway management: anesthesia and airway protection  Patient position: sniffing  MILS maintained throughout  Sedation level: deep     Final Airway Details   Preoxygenated: yes  Final airway type: endotracheal airway  Successful airway: ETT  Cuffed: yes   Successful intubation technique: video laryngoscopy  Blade: Sue  Blade size: #4  ETT size (mm): 7.5  Cormack-Lehane Classification: grade I - full view of glottis  Placement verified by: chest auscultation and capnometry   Measured from: lips  ETT to lips (cm): 22  Number of attempts at approach: 1

## 2025-06-24 LAB
LAB AP ASR DISCLAIMER: NORMAL
LABORATORY COMMENT REPORT: NORMAL
PATH REPORT.FINAL DX SPEC: NORMAL
PATH REPORT.GROSS SPEC: NORMAL
PATH REPORT.RELEVANT HX SPEC: NORMAL
PATH REPORT.TOTAL CANCER: NORMAL

## 2025-07-28 DIAGNOSIS — K21.9 GASTROESOPHAGEAL REFLUX DISEASE WITHOUT ESOPHAGITIS: ICD-10-CM

## 2025-07-28 RX ORDER — PANTOPRAZOLE SODIUM 40 MG/1
40 TABLET, DELAYED RELEASE ORAL DAILY
Qty: 90 TABLET | Refills: 2 | Status: SHIPPED | OUTPATIENT
Start: 2025-07-28

## 2025-08-13 DIAGNOSIS — F41.9 ANXIETY DISORDER, UNSPECIFIED TYPE: ICD-10-CM

## 2025-08-13 RX ORDER — ESCITALOPRAM OXALATE 5 MG/1
5 TABLET ORAL DAILY
Qty: 90 TABLET | Refills: 1 | Status: SHIPPED | OUTPATIENT
Start: 2025-08-13

## 2025-09-12 ENCOUNTER — APPOINTMENT (OUTPATIENT)
Dept: PRIMARY CARE | Facility: CLINIC | Age: 75
End: 2025-09-12
Payer: MEDICARE

## 2025-11-13 ENCOUNTER — APPOINTMENT (OUTPATIENT)
Dept: UROLOGY | Facility: CLINIC | Age: 75
End: 2025-11-13
Payer: MEDICARE

## 2026-03-13 ENCOUNTER — APPOINTMENT (OUTPATIENT)
Dept: PRIMARY CARE | Facility: CLINIC | Age: 76
End: 2026-03-13
Payer: MEDICARE

## (undated) DEVICE — Device

## (undated) DEVICE — EVACUATOR, WOUND, SUCTION, CLOSED, JACKSON-PRATT, 100 CC, SILICONE

## (undated) DEVICE — ADHESIVE, SKIN, LIQUIBAND EXCEED

## (undated) DEVICE — DRAPE, SHEET, ENDOSCOPY, GENERAL, FENESTRATED, ARMBOARD COVER, 98 X 123.5 IN, DISPOSABLE, LF, STERILE

## (undated) DEVICE — COVER, CART, 45 X 27 X 48 IN, CLEAR

## (undated) DEVICE — SPONGE, DISSECTOR, PEANUT, 3/8, STERILE 5 FOAM HOLDER"

## (undated) DEVICE — SUTURE, VICRYL, 3-0, 27 IN, SH, VIOLET

## (undated) DEVICE — TOWEL, SURGICAL, NEURO, O/R, 16 X 26, BLUE, STERILE

## (undated) DEVICE — NEEDLE, HYPODERMIC, MONOJECT, TRI-BEVELED, ANTI-CORING, 25 G X 1.25 IN, LUER LOCK HUB, RED

## (undated) DEVICE — BINDER, ABDOMINAL, 4 PANEL, 12 X 46-62 IN

## (undated) DEVICE — MANIFOLD, 4 PORT NEPTUNE STANDARD

## (undated) DEVICE — SUTURE, VICRYL, 3-0, 18 IN, UNDYED

## (undated) DEVICE — SUTURE, VICRYL, 4-0, 18 IN, UNDYED BR PS-2

## (undated) DEVICE — GOWN, SURGICAL, SMARTGOWN, XLARGE, STERILE

## (undated) DEVICE — TOWEL, OR, XRAY DETECT 5 PK, WHITE, 17X26, W/DMT TAG, ST